# Patient Record
Sex: FEMALE | Race: BLACK OR AFRICAN AMERICAN | NOT HISPANIC OR LATINO | URBAN - METROPOLITAN AREA
[De-identification: names, ages, dates, MRNs, and addresses within clinical notes are randomized per-mention and may not be internally consistent; named-entity substitution may affect disease eponyms.]

---

## 2017-01-10 ENCOUNTER — OUTPATIENT (OUTPATIENT)
Dept: OUTPATIENT SERVICES | Facility: HOSPITAL | Age: 37
LOS: 1 days | Discharge: HOME | End: 2017-01-10

## 2017-01-10 ENCOUNTER — APPOINTMENT (OUTPATIENT)
Dept: PULMONOLOGY | Facility: CLINIC | Age: 37
End: 2017-01-10

## 2017-01-10 VITALS
OXYGEN SATURATION: 98 % | BODY MASS INDEX: 26.46 KG/M2 | DIASTOLIC BLOOD PRESSURE: 68 MMHG | HEIGHT: 64 IN | HEART RATE: 60 BPM | WEIGHT: 155 LBS | SYSTOLIC BLOOD PRESSURE: 105 MMHG

## 2017-01-10 DIAGNOSIS — Z82.69 FAMILY HISTORY OF OTHER DISEASES OF THE MUSCULOSKELETAL SYSTEM AND CONNECTIVE TISSUE: ICD-10-CM

## 2017-01-10 DIAGNOSIS — Z83.2 FAMILY HISTORY OF DISEASES OF THE BLOOD AND BLOOD-FORMING ORGANS AND CERTAIN DISORDERS INVOLVING THE IMMUNE MECHANISM: ICD-10-CM

## 2017-01-10 DIAGNOSIS — Q85.00 NEUROFIBROMATOSIS, UNSPECIFIED: ICD-10-CM

## 2017-01-10 DIAGNOSIS — G43.909 MIGRAINE, UNSPECIFIED, NOT INTRACTABLE, W/OUT STATUS MIGRAINOSUS: ICD-10-CM

## 2017-01-24 ENCOUNTER — APPOINTMENT (OUTPATIENT)
Dept: PULMONOLOGY | Facility: CLINIC | Age: 37
End: 2017-01-24

## 2017-01-24 VITALS
HEIGHT: 64 IN | OXYGEN SATURATION: 100 % | WEIGHT: 155 LBS | DIASTOLIC BLOOD PRESSURE: 68 MMHG | SYSTOLIC BLOOD PRESSURE: 116 MMHG | HEART RATE: 66 BPM | BODY MASS INDEX: 26.46 KG/M2

## 2017-01-24 DIAGNOSIS — Z78.9 OTHER SPECIFIED HEALTH STATUS: ICD-10-CM

## 2017-01-24 LAB
ANA PAT FLD IF-IMP: ABNORMAL
ANA TITR SER: ABNORMAL
APTT PPP: 39.9 SEC
CRP SERPL-MCNC: 1.99 MG/DL
DSDNA AB SER-ACNC: <12 IU/ML

## 2017-01-26 LAB
PS IGG SER-ACNC: <10 U/ML
PS IGM SER-ACNC: <25 U/ML

## 2017-01-31 LAB
CARDIOLIPIN IGG SER IA-ACNC: < 14 GPL
CARDIOLIPIN IGM SER IA-ACNC: < 12 MPL
FACT X ACT/NOR PPP: 94 %

## 2017-02-03 ENCOUNTER — OUTPATIENT (OUTPATIENT)
Dept: OUTPATIENT SERVICES | Facility: HOSPITAL | Age: 37
LOS: 1 days | Discharge: HOME | End: 2017-02-03

## 2017-02-03 ENCOUNTER — APPOINTMENT (OUTPATIENT)
Dept: HEMATOLOGY ONCOLOGY | Facility: CLINIC | Age: 37
End: 2017-02-03

## 2017-02-03 VITALS
DIASTOLIC BLOOD PRESSURE: 71 MMHG | TEMPERATURE: 99.3 F | HEART RATE: 81 BPM | SYSTOLIC BLOOD PRESSURE: 117 MMHG | RESPIRATION RATE: 14 BRPM | HEIGHT: 64 IN | BODY MASS INDEX: 26.98 KG/M2 | WEIGHT: 158 LBS

## 2017-02-03 DIAGNOSIS — Z86.69 PERSONAL HISTORY OF OTHER DISEASES OF THE NERVOUS SYSTEM AND SENSE ORGANS: ICD-10-CM

## 2017-02-03 DIAGNOSIS — D47.3 ESSENTIAL (HEMORRHAGIC) THROMBOCYTHEMIA: ICD-10-CM

## 2017-03-07 ENCOUNTER — APPOINTMENT (OUTPATIENT)
Dept: PULMONOLOGY | Facility: CLINIC | Age: 37
End: 2017-03-07

## 2017-03-13 PROBLEM — D50.9 IRON DEFICIENCY ANEMIA: Status: ACTIVE | Noted: 2017-03-13

## 2017-03-13 LAB
B2 GLYCOPROT1 IGA SER-ACNC: < 9 SAU
B2 GLYCOPROT1 IGG SER-ACNC: < 9 SGU
B2 GLYCOPROT1 IGM SER-ACNC: < 9 SMU
BASOPHILS # BLD: 0.11 TH/MM3
BASOPHILS NFR BLD: 0.8 %
EOSINOPHIL # BLD: 0.5 TH/MM3
EOSINOPHIL NFR BLD: 3.7 %
ERYTHROCYTE [DISTWIDTH] IN BLOOD BY AUTOMATED COUNT: 18 %
FERRITIN SERPL-MCNC: 17 NG/ML
GRANULOCYTES # BLD: 8.53 TH/MM3
GRANULOCYTES NFR BLD: 63.7 %
HCT VFR BLD AUTO: 30.6 %
HGB A MFR BLD: 97.9 %
HGB A2 MFR BLD: 2.1 %
HGB BLD-MCNC: 9.6 G/DL
HGB FRACT BLD ELPH CITRATE-IMP: NORMAL
IMM GRANULOCYTES # BLD: 0.02 TH/MM3
IMM GRANULOCYTES NFR BLD: 0.1 %
INTERPRETATION AND REPORT- PF2 (NORTH): NORMAL
IRON SERPL-MCNC: 12 UG/DL
JAK2 GENE MUT ANL BLD/T: NORMAL
LYMPHOCYTES # BLD: 3.33 TH/MM3
LYMPHOCYTES NFR BLD: 24.8 %
MCH RBC QN AUTO: 20.2 PG
MCHC RBC AUTO-ENTMCNC: 31.4 G/DL
MCV RBC AUTO: 64.3 FL
MONOCYTES # BLD: 0.92 TH/MM3
MONOCYTES NFR BLD: 6.9 %
PERCENT SATURATION (NORTH): 3.3 %
PLATELET # BLD: 571 TH/MM3
PMV BLD AUTO: 10 FL
RBC # BLD AUTO: 4.76 MIL/MM3
TIBC SERPL-MCNC: 364 UG/DL
TRANSFERRIN SERPL-MCNC: 260 MG/DL
WBC # BLD: 13.41 TH/MM3

## 2017-03-15 ENCOUNTER — APPOINTMENT (OUTPATIENT)
Dept: HEMATOLOGY ONCOLOGY | Facility: CLINIC | Age: 37
End: 2017-03-15

## 2017-03-15 DIAGNOSIS — D50.9 IRON DEFICIENCY ANEMIA, UNSPECIFIED: ICD-10-CM

## 2017-03-21 ENCOUNTER — APPOINTMENT (OUTPATIENT)
Dept: HEMATOLOGY ONCOLOGY | Facility: CLINIC | Age: 37
End: 2017-03-21

## 2017-06-27 DIAGNOSIS — D64.9 ANEMIA, UNSPECIFIED: ICD-10-CM

## 2017-06-27 DIAGNOSIS — D68.69 OTHER THROMBOPHILIA: ICD-10-CM

## 2017-10-26 DIAGNOSIS — I26.99 OTHER PULMONARY EMBOLISM WITHOUT ACUTE COR PULMONALE: ICD-10-CM

## 2017-11-18 ENCOUNTER — EMERGENCY (EMERGENCY)
Facility: HOSPITAL | Age: 37
LOS: 0 days | Discharge: HOME | End: 2017-11-19
Admitting: FAMILY MEDICINE

## 2017-11-18 DIAGNOSIS — Z79.01 LONG TERM (CURRENT) USE OF ANTICOAGULANTS: ICD-10-CM

## 2017-11-18 DIAGNOSIS — R53.1 WEAKNESS: ICD-10-CM

## 2017-11-18 DIAGNOSIS — Z79.899 OTHER LONG TERM (CURRENT) DRUG THERAPY: ICD-10-CM

## 2017-11-18 DIAGNOSIS — R06.02 SHORTNESS OF BREATH: ICD-10-CM

## 2017-11-18 DIAGNOSIS — I26.99 OTHER PULMONARY EMBOLISM WITHOUT ACUTE COR PULMONALE: ICD-10-CM

## 2017-11-18 DIAGNOSIS — N93.9 ABNORMAL UTERINE AND VAGINAL BLEEDING, UNSPECIFIED: ICD-10-CM

## 2017-12-28 ENCOUNTER — APPOINTMENT (OUTPATIENT)
Dept: OBGYN | Facility: CLINIC | Age: 37
End: 2017-12-28

## 2018-07-27 ENCOUNTER — OUTPATIENT (OUTPATIENT)
Dept: OUTPATIENT SERVICES | Facility: HOSPITAL | Age: 38
LOS: 1 days | Discharge: HOME | End: 2018-07-27

## 2018-07-27 DIAGNOSIS — R05 COUGH: ICD-10-CM

## 2018-07-27 DIAGNOSIS — J18.9 PNEUMONIA, UNSPECIFIED ORGANISM: ICD-10-CM

## 2018-08-03 ENCOUNTER — OUTPATIENT (OUTPATIENT)
Dept: OUTPATIENT SERVICES | Facility: HOSPITAL | Age: 38
LOS: 1 days | Discharge: HOME | End: 2018-08-03

## 2018-08-03 ENCOUNTER — INPATIENT (INPATIENT)
Facility: HOSPITAL | Age: 38
LOS: 5 days | Discharge: HOME | End: 2018-08-09
Attending: HOSPITALIST | Admitting: HOSPITALIST
Payer: MEDICAID

## 2018-08-03 VITALS
DIASTOLIC BLOOD PRESSURE: 59 MMHG | OXYGEN SATURATION: 100 % | HEART RATE: 113 BPM | RESPIRATION RATE: 20 BRPM | TEMPERATURE: 99 F | SYSTOLIC BLOOD PRESSURE: 127 MMHG

## 2018-08-03 DIAGNOSIS — J18.9 PNEUMONIA, UNSPECIFIED ORGANISM: ICD-10-CM

## 2018-08-03 DIAGNOSIS — R05 COUGH: ICD-10-CM

## 2018-08-03 LAB
ALBUMIN SERPL ELPH-MCNC: 4.1 G/DL — SIGNIFICANT CHANGE UP (ref 3.5–5.2)
ALP SERPL-CCNC: 76 U/L — SIGNIFICANT CHANGE UP (ref 30–115)
ALT FLD-CCNC: 15 U/L — SIGNIFICANT CHANGE UP (ref 0–41)
ANION GAP SERPL CALC-SCNC: 18 MMOL/L — HIGH (ref 7–14)
AST SERPL-CCNC: 16 U/L — SIGNIFICANT CHANGE UP (ref 0–41)
BILIRUB SERPL-MCNC: 0.2 MG/DL — SIGNIFICANT CHANGE UP (ref 0.2–1.2)
BUN SERPL-MCNC: 8 MG/DL — LOW (ref 10–20)
CALCIUM SERPL-MCNC: 9.4 MG/DL — SIGNIFICANT CHANGE UP (ref 8.5–10.1)
CHLORIDE SERPL-SCNC: 100 MMOL/L — SIGNIFICANT CHANGE UP (ref 98–110)
CO2 SERPL-SCNC: 22 MMOL/L — SIGNIFICANT CHANGE UP (ref 17–32)
CREAT SERPL-MCNC: 0.8 MG/DL — SIGNIFICANT CHANGE UP (ref 0.7–1.5)
GLUCOSE SERPL-MCNC: 100 MG/DL — HIGH (ref 70–99)
HCT VFR BLD CALC: 37 % — SIGNIFICANT CHANGE UP (ref 37–47)
HGB BLD-MCNC: 11.5 G/DL — LOW (ref 12–16)
MCHC RBC-ENTMCNC: 22.2 PG — LOW (ref 27–31)
MCHC RBC-ENTMCNC: 31.1 G/DL — LOW (ref 32–37)
MCV RBC AUTO: 71.6 FL — LOW (ref 81–99)
NRBC # BLD: 0 /100 WBCS — SIGNIFICANT CHANGE UP (ref 0–0)
PLATELET # BLD AUTO: 447 K/UL — HIGH (ref 130–400)
POTASSIUM SERPL-MCNC: 4.4 MMOL/L — SIGNIFICANT CHANGE UP (ref 3.5–5)
POTASSIUM SERPL-SCNC: 4.4 MMOL/L — SIGNIFICANT CHANGE UP (ref 3.5–5)
PROT SERPL-MCNC: 8 G/DL — SIGNIFICANT CHANGE UP (ref 6–8)
RBC # BLD: 5.17 M/UL — SIGNIFICANT CHANGE UP (ref 4.2–5.4)
RBC # FLD: 14.8 % — HIGH (ref 11.5–14.5)
SODIUM SERPL-SCNC: 140 MMOL/L — SIGNIFICANT CHANGE UP (ref 135–146)
WBC # BLD: 12.94 K/UL — HIGH (ref 4.8–10.8)
WBC # FLD AUTO: 12.94 K/UL — HIGH (ref 4.8–10.8)

## 2018-08-03 NOTE — ED ADULT NURSE NOTE - NSIMPLEMENTINTERV_GEN_ALL_ED
Implemented All Universal Safety Interventions:  [place] to call system. Call bell, personal items and telephone within reach. Instruct patient to call for assistance. Room bathroom lighting operational. Non-slip footwear when patient is off stretcher. Physically safe environment: no spills, clutter or unnecessary equipment. Stretcher in lowest position, wheels locked, appropriate side rails in place.

## 2018-08-03 NOTE — ED PROVIDER NOTE - MEDICAL DECISION MAKING DETAILS
38 female here for evaluation of fatigue, refractory pneumonia despite outpatient antibiotics. Got labs imaging supportive care and IV antibiotics, will admit for new left sided cavitary lesion.

## 2018-08-03 NOTE — ED PROVIDER NOTE - NS ED ROS FT
Eyes:  No visual changes, eye pain or discharge.  ENMT:  No hearing changes, pain, discharge or infections. No neck pain or stiffness.  Cardiac:  SOB. No chest pain or edema.   Respiratory: Cough, SOB. No respiratory distress. No hemoptysis.   GI:  No nausea, vomiting, diarrhea or abdominal pain.  :  No dysuria, frequency or burning.  MS:  No myalgia, muscle weakness, joint pain or back pain.  Neuro:  No headache or weakness.  No LOC.  Skin:  No skin rash.   Endocrine: No history of thyroid disease or diabetes.

## 2018-08-03 NOTE — ED ADULT NURSE REASSESSMENT NOTE - NS ED NURSE REASSESS COMMENT FT1
pt received from previous shift. awake alert and interactive. pt denies any pain/discomfort at this time. remains in iso room for r/b TB. labs sent and IV placed. will continue care.

## 2018-08-03 NOTE — ED PROVIDER NOTE - PHYSICAL EXAMINATION
CONSTITUTIONAL: Well-developed; well-nourished; in no acute distress.   SKIN: warm, dry  HEAD: Normocephalic; atraumatic.  EYES: normal sclera and conjunctiva   ENT: No nasal discharge; airway clear.  NECK: Supple; non tender.  CARD: S1, S2 normal; no murmurs, gallops, or rubs. Regular rate and rhythm.   RESP: No wheezes, rales or rhonchi. No increased respiratory effort. No nasal flaring, accessory muscle use.   ABD: soft ntnd  EXT: Normal ROM.  No clubbing, cyanosis or edema.   LYMPH: No acute cervical adenopathy.  NEURO: Alert, oriented, grossly unremarkable  PSYCH: Cooperative, appropriate.

## 2018-08-03 NOTE — ED PROVIDER NOTE - ATTENDING CONTRIBUTION TO CARE
I personally evaluated the patient. I reviewed the Resident’s or Physician Assistant’s note (as assigned above), and agree with the findings and plan except as documented in my note.    38 female here for cough x 1 month, refractory to multiple rounds of respiratory antibiotics. Had persistent LLL opacity on subsequent imaging today.     No travel, incarceration, sick contacts.     ROS: fatigue, night sweats & fevers.  No weight loss, no rash, no neck pain or neuro symptoms.     PE: female in no distress. CHEST: CTA bilateral with left sided basilar dullness. CV: pulses intact. SKIN: normal. EXT: FROM x 4 NVI.    Impression: pneumonia, cavitary lesion    Plan: IV labs imaging supportive care ABX and admission

## 2018-08-04 LAB
ANION GAP SERPL CALC-SCNC: 15 MMOL/L — HIGH (ref 7–14)
BASOPHILS # BLD AUTO: 0.13 K/UL — SIGNIFICANT CHANGE UP (ref 0–0.2)
BASOPHILS NFR BLD AUTO: 1.1 % — HIGH (ref 0–1)
BUN SERPL-MCNC: 7 MG/DL — LOW (ref 10–20)
CALCIUM SERPL-MCNC: 8.8 MG/DL — SIGNIFICANT CHANGE UP (ref 8.5–10.1)
CHLORIDE SERPL-SCNC: 103 MMOL/L — SIGNIFICANT CHANGE UP (ref 98–110)
CO2 SERPL-SCNC: 20 MMOL/L — SIGNIFICANT CHANGE UP (ref 17–32)
CREAT SERPL-MCNC: 0.7 MG/DL — SIGNIFICANT CHANGE UP (ref 0.7–1.5)
EOSINOPHIL # BLD AUTO: 0.63 K/UL — SIGNIFICANT CHANGE UP (ref 0–0.7)
EOSINOPHIL NFR BLD AUTO: 5.3 % — SIGNIFICANT CHANGE UP (ref 0–8)
ERYTHROCYTE [SEDIMENTATION RATE] IN BLOOD: 58 MM/HR — HIGH (ref 0–15)
GLUCOSE SERPL-MCNC: 88 MG/DL — SIGNIFICANT CHANGE UP (ref 70–99)
HCT VFR BLD CALC: 34.7 % — LOW (ref 37–47)
HGB BLD-MCNC: 10.9 G/DL — LOW (ref 12–16)
IMM GRANULOCYTES NFR BLD AUTO: 0.3 % — SIGNIFICANT CHANGE UP (ref 0.1–0.3)
LYMPHOCYTES # BLD AUTO: 28.6 % — SIGNIFICANT CHANGE UP (ref 20.5–51.1)
LYMPHOCYTES # BLD AUTO: 3.39 K/UL — SIGNIFICANT CHANGE UP (ref 1.2–3.4)
MCHC RBC-ENTMCNC: 22.2 PG — LOW (ref 27–31)
MCHC RBC-ENTMCNC: 31.4 G/DL — LOW (ref 32–37)
MCV RBC AUTO: 70.5 FL — LOW (ref 81–99)
MONOCYTES # BLD AUTO: 0.96 K/UL — HIGH (ref 0.1–0.6)
MONOCYTES NFR BLD AUTO: 8.1 % — SIGNIFICANT CHANGE UP (ref 1.7–9.3)
NEUTROPHILS # BLD AUTO: 6.72 K/UL — HIGH (ref 1.4–6.5)
NEUTROPHILS NFR BLD AUTO: 56.6 % — SIGNIFICANT CHANGE UP (ref 42.2–75.2)
NRBC # BLD: 0 /100 WBCS — SIGNIFICANT CHANGE UP (ref 0–0)
PLATELET # BLD AUTO: 427 K/UL — HIGH (ref 130–400)
POTASSIUM SERPL-MCNC: 4.3 MMOL/L — SIGNIFICANT CHANGE UP (ref 3.5–5)
POTASSIUM SERPL-SCNC: 4.3 MMOL/L — SIGNIFICANT CHANGE UP (ref 3.5–5)
RBC # BLD: 4.92 M/UL — SIGNIFICANT CHANGE UP (ref 4.2–5.4)
RBC # FLD: 14.8 % — HIGH (ref 11.5–14.5)
SODIUM SERPL-SCNC: 138 MMOL/L — SIGNIFICANT CHANGE UP (ref 135–146)
WBC # BLD: 11.86 K/UL — HIGH (ref 4.8–10.8)
WBC # FLD AUTO: 11.86 K/UL — HIGH (ref 4.8–10.8)

## 2018-08-04 RX ORDER — CEFEPIME 1 G/1
INJECTION, POWDER, FOR SOLUTION INTRAMUSCULAR; INTRAVENOUS
Qty: 0 | Refills: 0 | Status: DISCONTINUED | OUTPATIENT
Start: 2018-08-04 | End: 2018-08-04

## 2018-08-04 RX ORDER — VANCOMYCIN HCL 1 G
1000 VIAL (EA) INTRAVENOUS EVERY 8 HOURS
Qty: 0 | Refills: 0 | Status: DISCONTINUED | OUTPATIENT
Start: 2018-08-04 | End: 2018-08-04

## 2018-08-04 RX ORDER — ENOXAPARIN SODIUM 100 MG/ML
40 INJECTION SUBCUTANEOUS EVERY 24 HOURS
Qty: 0 | Refills: 0 | Status: DISCONTINUED | OUTPATIENT
Start: 2018-08-04 | End: 2018-08-09

## 2018-08-04 RX ORDER — AMPICILLIN SODIUM AND SULBACTAM SODIUM 250; 125 MG/ML; MG/ML
3 INJECTION, POWDER, FOR SUSPENSION INTRAMUSCULAR; INTRAVENOUS EVERY 6 HOURS
Qty: 0 | Refills: 0 | Status: DISCONTINUED | OUTPATIENT
Start: 2018-08-04 | End: 2018-08-09

## 2018-08-04 RX ORDER — CIPROFLOXACIN LACTATE 400MG/40ML
400 VIAL (ML) INTRAVENOUS ONCE
Qty: 0 | Refills: 0 | Status: COMPLETED | OUTPATIENT
Start: 2018-08-04 | End: 2018-08-04

## 2018-08-04 RX ORDER — PIPERACILLIN AND TAZOBACTAM 4; .5 G/20ML; G/20ML
4.5 INJECTION, POWDER, LYOPHILIZED, FOR SOLUTION INTRAVENOUS EVERY 6 HOURS
Qty: 0 | Refills: 0 | Status: DISCONTINUED | OUTPATIENT
Start: 2018-08-04 | End: 2018-08-04

## 2018-08-04 RX ORDER — ACETAMINOPHEN 500 MG
650 TABLET ORAL EVERY 6 HOURS
Qty: 0 | Refills: 0 | Status: DISCONTINUED | OUTPATIENT
Start: 2018-08-04 | End: 2018-08-09

## 2018-08-04 RX ORDER — CEFEPIME 1 G/1
1000 INJECTION, POWDER, FOR SOLUTION INTRAMUSCULAR; INTRAVENOUS ONCE
Qty: 0 | Refills: 0 | Status: COMPLETED | OUTPATIENT
Start: 2018-08-04 | End: 2018-08-04

## 2018-08-04 RX ORDER — CEFEPIME 1 G/1
1000 INJECTION, POWDER, FOR SOLUTION INTRAMUSCULAR; INTRAVENOUS EVERY 12 HOURS
Qty: 0 | Refills: 0 | Status: DISCONTINUED | OUTPATIENT
Start: 2018-08-04 | End: 2018-08-04

## 2018-08-04 RX ADMIN — CEFEPIME 100 MILLIGRAM(S): 1 INJECTION, POWDER, FOR SOLUTION INTRAMUSCULAR; INTRAVENOUS at 01:37

## 2018-08-04 RX ADMIN — Medication 250 MILLIGRAM(S): at 06:26

## 2018-08-04 RX ADMIN — Medication 250 MILLIGRAM(S): at 14:17

## 2018-08-04 RX ADMIN — PIPERACILLIN AND TAZOBACTAM 25 GRAM(S): 4; .5 INJECTION, POWDER, LYOPHILIZED, FOR SOLUTION INTRAVENOUS at 10:14

## 2018-08-04 RX ADMIN — Medication 200 MILLIGRAM(S): at 01:37

## 2018-08-04 RX ADMIN — AMPICILLIN SODIUM AND SULBACTAM SODIUM 200 GRAM(S): 250; 125 INJECTION, POWDER, FOR SUSPENSION INTRAMUSCULAR; INTRAVENOUS at 17:56

## 2018-08-04 RX ADMIN — ENOXAPARIN SODIUM 40 MILLIGRAM(S): 100 INJECTION SUBCUTANEOUS at 06:26

## 2018-08-04 RX ADMIN — PIPERACILLIN AND TAZOBACTAM 25 GRAM(S): 4; .5 INJECTION, POWDER, LYOPHILIZED, FOR SOLUTION INTRAVENOUS at 16:05

## 2018-08-04 NOTE — CONSULT NOTE ADULT - SUBJECTIVE AND OBJECTIVE BOX
LUBA RODRIGUEZ  38y, Female  Allergy: No Known Allergies      HPI:  38 F w/ a pmh of neurofibromatosis, migraines, & an unprovoked PE in 2016 sent in to the ED by her PMD for persistent pneumonia. The pts began having a non-productive cough & fever on 7/17 & went to urgent care & was prescribed a zpak & amoxicillin for 3 days. She continued to be symptomatic and went to her pmd who dx her w/ pna & prescribed her Levaquin for 10 days. The pt returned to her pmd w/out improvement & a repeat cxr showed a cavitary lesion in the LLL.    Pt denies fever, night sweats, n/v, hemoptysis, sputum production, wt loss, known TB contacts, travel outside of , [facility]. No pets at home. 5 kids. Previous negative TB testing, no incarceration or homeless shelters.     2016 CT Chest with LLL changes.    FAMILY HISTORY:  No pertinent family history in first degree relatives    PAST MEDICAL & SURGICAL HISTORY:  Migraine  Pulmonary embolism  No significant past surgical history      ROS negative except as per HPI    VITALS:  T(F): 97.8, Max: 99.1 (08-03-18 @ 18:31)  HR: 76  BP: 105/51  RR: 16Vital Signs Last 24 Hrs  T(C): 36.6 (04 Aug 2018 16:02), Max: 37.3 (03 Aug 2018 18:31)  T(F): 97.8 (04 Aug 2018 16:02), Max: 99.1 (03 Aug 2018 18:31)  HR: 76 (04 Aug 2018 16:02) (76 - 113)  BP: 105/51 (04 Aug 2018 16:02) (105/51 - 127/59)  BP(mean): --  RR: 16 (04 Aug 2018 16:02) (16 - 20)  SpO2: 98% (04 Aug 2018 16:02) (98% - 100%)    PHYSICAL EXAM:  Gen: Awake and alert, non-toxic appearing, NAD  HEENT: NCAT. EOMI. MMM.   Neck: Supple, no cervical LAD  CV: RRR, no murmurs  Lungs: CTAB, no w/r/r  Abd: Soft. NTND  Extr: wwp, no edema  Skin: no rash, +NF  Neuro: No focal deficits  Lines: clean      TESTS & MEASUREMENTS:                        10.9   11.86 )-----------( 427      ( 04 Aug 2018 07:15 )             34.7     08-04    138  |  103  |  7<L>  ----------------------------<  88  4.3   |  20  |  0.7    Ca    8.8      04 Aug 2018 07:15    TPro  8.0  /  Alb  4.1  /  TBili  0.2  /  DBili  x   /  AST  16  /  ALT  15  /  AlkPhos  76  08-03    LIVER FUNCTIONS - ( 03 Aug 2018 19:13 )  Alb: 4.1 g/dL / Pro: 8.0 g/dL / ALK PHOS: 76 U/L / ALT: 15 U/L / AST: 16 U/L / GGT: x                   RADIOLOGY & ADDITIONAL TESTS:    ANTIBIOTICS:    cefepime   IVPB   100 mL/Hr IV Intermittent (08-04-18 @ 01:37)    ciprofloxacin   IVPB   200 mL/Hr IV Intermittent (08-04-18 @ 01:37)    levoFLOXacin IVPB   100 mL/Hr IV Intermittent (08-04-18 @ 08:29)    piperacillin/tazobactam IVPB.   25 mL/Hr IV Intermittent (08-04-18 @ 16:05)   25 mL/Hr IV Intermittent (08-04-18 @ 10:14)    vancomycin  IVPB   250 mL/Hr IV Intermittent (08-04-18 @ 14:17)   250 mL/Hr IV Intermittent (08-04-18 @ 06:26)

## 2018-08-04 NOTE — H&P ADULT - NSHPPHYSICALEXAM_GEN_ALL_CORE
VITALS:   T(F): 99.1  HR: 113  BP: 127/59  RR: 20  SpO2: 100%    PHYSICAL EXAM:  GEN: No acute distress  LUNGS: Rales on L side, clear on right  HEART: S1/S2 present. RRR.   ABD: Soft, non-tender, non-distended. Bowel sounds present  EXT: NC/NC/NE/SILVA  NEURO: AAOX3

## 2018-08-04 NOTE — CONSULT NOTE ADULT - ASSESSMENT
38 F w/ a pmh of neurofibromatosis, migraines, & an unprovoked PE in 2016 sent in to the ED by her PMD for persistent pneumonia. The pts began having a non-productive cough & fever on 7/17 s/p zpak and levaquin found to have a LLL cavitary lesion    No risk factors for TB and previous changes on CT LLL in 2016 during PE diagnosis. Would be strange to have basilar TB disease.  Cavity related to ?Neurofibromatosis ?ruptured bullae/cyst?    sepsis ruled out on admission    - D/C airborne precautions, discuss Pulm   - Change cefepime/vanc to unasyn 3g q6h  - Pulm consult    Spectra [phone]

## 2018-08-04 NOTE — H&P ADULT - NSHPLABSRESULTS_GEN_ALL_CORE
LABS:                        11.5   12.94 )-----------( 447      ( 03 Aug 2018 19:13 )             37.0     08-03    140  |  100  |  8<L>  ----------------------------<  100<H>  4.4   |  22  |  0.8    Ca    9.4      03 Aug 2018 19:13    TPro  8.0  /  Alb  4.1  /  TBili  0.2  /  DBili  x   /  AST  16  /  ALT  15  /  AlkPhos  76  08-03                  RADIOLOGY:    CT CHEST   IMPRESSION:    1.  Left lower lobe bronchiectasis, distal airways increased in size   since 2017.     2.  Increased left lower lobe airspace opacities/consolidations, possibly   infectious or inflammatory in nature.              ******PRELIMINARY REPORT******      CXR    pending read

## 2018-08-04 NOTE — H&P ADULT - HISTORY OF PRESENT ILLNESS
38 F w/ a pmh of neurofibromatosis, migraines, & a PE in 2016 who is sent in to the ED by her PMD for persistent pneumonia. The pts began having a non-productive cough & fever on 7/17 & went to urgent care & was prescribed a zpak & amoxicillin for 3 days. She continued to be symptomatic and went to her pmd who dx her w/ pna & prescribed her Levaquin for 10 days. The pt returned to her pmd w/out improvement & a repeat cxr showed a cavitary lesion on the left side and her pmd sent her to ED to r/o TB. ROS is + for SOB, fatigue, tmax of 101.3, denies night sweats or chills or cp, n/v or diarrhea. Pt denies any travel history, has never left the [place], denies any contact w/ anyone w/ TB.  Side note, pt was discharged on eliquis in 2016 for pe, and had stopped taking it on her own.

## 2018-08-04 NOTE — H&P ADULT - NSHPSOCIALHISTORY_GEN_ALL_CORE
Lives at home with her children  Negative for tobacco use or illicit drug use. Drinks alcohol occasionally

## 2018-08-04 NOTE — H&P ADULT - ASSESSMENT
38 F w/ a pmh of neurofibromatosis, migraines, & a PE in 2016 who is sent in to the ED by her PMD for persistent pneumonia sxs (non-prod cough, on and off fevers, sob, fatigue) despite antibiotic treatment. O/p cxr revealed cavitary lesions & she was sent in by pmd to r/o tb as well.     # CAP. r/o TB  - Vanc, zosyn, and levofloxacin  - quantiferon, sputum induction & culture for afb  - id consult  - pulm consult  - check cultures  - f/u official reads on cxr and ct scan    # DVT ppx: Lovenox  # Regular diet  # Full code

## 2018-08-04 NOTE — H&P ADULT - ATTENDING COMMENTS
Patient seen and examined independently. I agree with the resident's note, physical exam, and plan except as below. Patient feels sightly better.       38 F w/ a pmh of neurofibromatosis, migraines, & a PE in 2016 who is sent in to the ED by her PMD for persistent pneumonia sxs (non-prod cough, on and off fevers, sob, fatigue) despite antibiotic treatment. O/p cxr revealed cavitary lesions & she was sent in by pmd to r/o tb as well.     Denies CP, N/V/D/C/AP, cough, wt loss, dizziness, new focal weakness, HA, vision changes, dysuria, or urinary symptoms, blood in stool.    ROS: all systems unremarkable except as above.     Gen: NAD, AA0x3  HEENT: PERRLA, EOM, no LAD  CV: nl S1 S2  Resp: decreased BS b/l  GI: NT/ND/S +BS  MS: no c/c/e  Neuro: nonfocal    # Cavitary lesion r/o TB vs MAC  - Vanc, zosyn, and levofloxacin  - quantiferon, sputum induction & culture for afb  - id consult  - pulm consult  - check cultures    #H/o PE  -completed >1yr of Eliquis  -not sure of etiology    #Migraines/Neurofibromatosis  -stable      # DVT ppx: Lovenox  # Regular diet  # Full code

## 2018-08-05 LAB
ALBUMIN SERPL ELPH-MCNC: 3.5 G/DL — SIGNIFICANT CHANGE UP (ref 3.5–5.2)
ALP SERPL-CCNC: 71 U/L — SIGNIFICANT CHANGE UP (ref 30–115)
ALT FLD-CCNC: 13 U/L — SIGNIFICANT CHANGE UP (ref 0–41)
ANION GAP SERPL CALC-SCNC: 16 MMOL/L — HIGH (ref 7–14)
AST SERPL-CCNC: 15 U/L — SIGNIFICANT CHANGE UP (ref 0–41)
BASOPHILS # BLD AUTO: 0.09 K/UL — SIGNIFICANT CHANGE UP (ref 0–0.2)
BASOPHILS NFR BLD AUTO: 0.9 % — SIGNIFICANT CHANGE UP (ref 0–1)
BILIRUB SERPL-MCNC: 0.2 MG/DL — SIGNIFICANT CHANGE UP (ref 0.2–1.2)
BUN SERPL-MCNC: 7 MG/DL — LOW (ref 10–20)
CALCIUM SERPL-MCNC: 8.8 MG/DL — SIGNIFICANT CHANGE UP (ref 8.5–10.1)
CHLORIDE SERPL-SCNC: 103 MMOL/L — SIGNIFICANT CHANGE UP (ref 98–110)
CO2 SERPL-SCNC: 21 MMOL/L — SIGNIFICANT CHANGE UP (ref 17–32)
CREAT SERPL-MCNC: 0.7 MG/DL — SIGNIFICANT CHANGE UP (ref 0.7–1.5)
CRP SERPL-MCNC: 4.25 MG/DL — HIGH (ref 0–0.4)
EOSINOPHIL # BLD AUTO: 0.52 K/UL — SIGNIFICANT CHANGE UP (ref 0–0.7)
EOSINOPHIL NFR BLD AUTO: 5 % — SIGNIFICANT CHANGE UP (ref 0–8)
GLUCOSE SERPL-MCNC: 86 MG/DL — SIGNIFICANT CHANGE UP (ref 70–99)
HCT VFR BLD CALC: 34 % — LOW (ref 37–47)
HGB BLD-MCNC: 10.7 G/DL — LOW (ref 12–16)
IMM GRANULOCYTES NFR BLD AUTO: 0.5 % — HIGH (ref 0.1–0.3)
LYMPHOCYTES # BLD AUTO: 2.26 K/UL — SIGNIFICANT CHANGE UP (ref 1.2–3.4)
LYMPHOCYTES # BLD AUTO: 21.5 % — SIGNIFICANT CHANGE UP (ref 20.5–51.1)
MCHC RBC-ENTMCNC: 22.3 PG — LOW (ref 27–31)
MCHC RBC-ENTMCNC: 31.5 G/DL — LOW (ref 32–37)
MCV RBC AUTO: 71 FL — LOW (ref 81–99)
MONOCYTES # BLD AUTO: 0.8 K/UL — HIGH (ref 0.1–0.6)
MONOCYTES NFR BLD AUTO: 7.6 % — SIGNIFICANT CHANGE UP (ref 1.7–9.3)
NEUTROPHILS # BLD AUTO: 6.78 K/UL — HIGH (ref 1.4–6.5)
NEUTROPHILS NFR BLD AUTO: 64.5 % — SIGNIFICANT CHANGE UP (ref 42.2–75.2)
NRBC # BLD: 0 /100 WBCS — SIGNIFICANT CHANGE UP (ref 0–0)
PLATELET # BLD AUTO: 397 K/UL — SIGNIFICANT CHANGE UP (ref 130–400)
POTASSIUM SERPL-MCNC: 4.4 MMOL/L — SIGNIFICANT CHANGE UP (ref 3.5–5)
POTASSIUM SERPL-SCNC: 4.4 MMOL/L — SIGNIFICANT CHANGE UP (ref 3.5–5)
PROT SERPL-MCNC: 6.9 G/DL — SIGNIFICANT CHANGE UP (ref 6–8)
RBC # BLD: 4.79 M/UL — SIGNIFICANT CHANGE UP (ref 4.2–5.4)
RBC # FLD: 14.7 % — HIGH (ref 11.5–14.5)
SODIUM SERPL-SCNC: 140 MMOL/L — SIGNIFICANT CHANGE UP (ref 135–146)
WBC # BLD: 10.5 K/UL — SIGNIFICANT CHANGE UP (ref 4.8–10.8)
WBC # FLD AUTO: 10.5 K/UL — SIGNIFICANT CHANGE UP (ref 4.8–10.8)

## 2018-08-05 RX ADMIN — AMPICILLIN SODIUM AND SULBACTAM SODIUM 200 GRAM(S): 250; 125 INJECTION, POWDER, FOR SUSPENSION INTRAMUSCULAR; INTRAVENOUS at 12:12

## 2018-08-05 RX ADMIN — AMPICILLIN SODIUM AND SULBACTAM SODIUM 200 GRAM(S): 250; 125 INJECTION, POWDER, FOR SUSPENSION INTRAMUSCULAR; INTRAVENOUS at 20:57

## 2018-08-05 RX ADMIN — AMPICILLIN SODIUM AND SULBACTAM SODIUM 200 GRAM(S): 250; 125 INJECTION, POWDER, FOR SUSPENSION INTRAMUSCULAR; INTRAVENOUS at 06:32

## 2018-08-05 RX ADMIN — AMPICILLIN SODIUM AND SULBACTAM SODIUM 200 GRAM(S): 250; 125 INJECTION, POWDER, FOR SUSPENSION INTRAMUSCULAR; INTRAVENOUS at 01:10

## 2018-08-05 NOTE — PROGRESS NOTE ADULT - SUBJECTIVE AND OBJECTIVE BOX
SUBJECTIVE:    Patient is a 38y old Female who presents with a chief complaint of PNA / R/o Tb (04 Aug 2018 02:13)    Currently admitted to medicine with the primary diagnosis of Pneumonia     Today is hospital day 1d. This morning she is resting comfortably in bed    PAST MEDICAL & SURGICAL HISTORY  Migraine  Pulmonary embolism  No significant past surgical history    SOCIAL HISTORY:  Negative for smoking/alcohol/drug use.     Home Medications:  Home Medications:      ALLERGIES:  No Known Allergies    MEDICATIONS:  STANDING MEDICATIONS  ampicillin/sulbactam  IVPB 3 Gram(s) IV Intermittent every 6 hours  enoxaparin Injectable 40 milliGRAM(s) SubCutaneous every 24 hours    PRN MEDICATIONS  acetaminophen   Tablet 650 milliGRAM(s) Oral every 6 hours PRN    VITALS:   Vital Signs Last 24 Hrs  T(C): 36.8 (05 Aug 2018 07:32), Max: 37.5 (04 Aug 2018 23:06)  T(F): 98.3 (05 Aug 2018 07:32), Max: 99.5 (04 Aug 2018 23:06)  HR: 73 (05 Aug 2018 07:32) (73 - 93)  BP: 105/56 (05 Aug 2018 07:32) (100/55 - 105/59)  BP(mean): --  RR: 17 (05 Aug 2018 07:32) (16 - 17)  SpO2: 100% (05 Aug 2018 07:32) (98% - 100%)  CAPILLARY BLOOD GLUCOSE    LABS:                        10.7   10.50 )-----------( 397      ( 05 Aug 2018 07:26 )             34.0     08-05    140  |  103  |  7<L>  ----------------------------<  86  4.4   |  21  |  0.7    Ca    8.8      05 Aug 2018 07:26    TPro  6.9  /  Alb  3.5  /  TBili  0.2  /  DBili  x   /  AST  15  /  ALT  13  /  AlkPhos  71  08-05    Sedimentation Rate, Erythrocyte: 58 mm/hr <H> (08-04-18 @ 16:52)    RADIOLOGY:  < from: CT Chest w/ IV Cont (08.03.18 @ 23:40) >  1.  When compared to prior 2017 studies, there is increased cavitation   within the left lower lobe with increased bronchiectasis and adjacent   patchy confluent opacities as well as peribronchiolar nodules. Correlate   for superimposed infection.    < end of copied text >    PHYSICAL EXAM:  GEN: No acute distress  LUNGS: Rales appreciated on Left  HEART: S1/S2 present. RRR.   ABD: Soft, non-tender, non-distended. Bowel sounds present  EXT: NC/NC/NE/2+PP/SILVA  NEURO: AAOX3

## 2018-08-05 NOTE — PROGRESS NOTE ADULT - SUBJECTIVE AND OBJECTIVE BOX
JENNIFER, LUBA  38y, Female      OVERNIGHT EVENTS:  afebrile  reports no change in cough    ROS negative except as per above    VITALS:  T(F): 98.7, Max: 99.5 (08-04-18 @ 23:06)  HR: 80  BP: 103/56  RR: 18Vital Signs Last 24 Hrs  T(C): 37.1 (05 Aug 2018 15:17), Max: 37.5 (04 Aug 2018 23:06)  T(F): 98.7 (05 Aug 2018 15:17), Max: 99.5 (04 Aug 2018 23:06)  HR: 80 (05 Aug 2018 15:17) (73 - 93)  BP: 103/56 (05 Aug 2018 15:17) (100/55 - 105/59)  BP(mean): --  RR: 18 (05 Aug 2018 15:17) (16 - 18)  SpO2: 100% (05 Aug 2018 15:17) (100% - 100%)    PHYSICAL EXAM:  Gen: Awake and alert, non-toxic appearing, NAD  HEENT: NCAT. EOMI. MMM.   Neck: Supple, no cervical LAD  CV: RRR, no murmurs  Lungs: CTAB, no w/r/r  Abd: Soft. NTND  Extr: wwp, no edema  Skin: no rash, +NF  Neuro: No focal deficits  Lines: clean      TESTS & MEASUREMENTS:                        10.7   10.50 )-----------( 397      ( 05 Aug 2018 07:26 )             34.0     08-05    140  |  103  |  7<L>  ----------------------------<  86  4.4   |  21  |  0.7    Ca    8.8      05 Aug 2018 07:26    TPro  6.9  /  Alb  3.5  /  TBili  0.2  /  DBili  x   /  AST  15  /  ALT  13  /  AlkPhos  71  08-05    LIVER FUNCTIONS - ( 05 Aug 2018 07:26 )  Alb: 3.5 g/dL / Pro: 6.9 g/dL / ALK PHOS: 71 U/L / ALT: 13 U/L / AST: 15 U/L / GGT: x             Culture - Blood (collected 08-04-18 @ 07:15)  Source: .Blood Blood  Preliminary Report (08-05-18 @ 15:02):    No growth to date.          RADIOLOGY & ADDITIONAL TESTS:    ANTIBIOTICS:  ampicillin/sulbactam  IVPB   200 mL/Hr IV Intermittent (08-05-18 @ 12:12)   200 mL/Hr IV Intermittent (08-05-18 @ 06:32)   200 mL/Hr IV Intermittent (08-05-18 @ 01:10)   200 mL/Hr IV Intermittent (08-04-18 @ 17:56)    cefepime   IVPB   100 mL/Hr IV Intermittent (08-04-18 @ 01:37)    ciprofloxacin   IVPB   200 mL/Hr IV Intermittent (08-04-18 @ 01:37)    levoFLOXacin IVPB   100 mL/Hr IV Intermittent (08-04-18 @ 08:29)    piperacillin/tazobactam IVPB.   25 mL/Hr IV Intermittent (08-04-18 @ 16:05)   25 mL/Hr IV Intermittent (08-04-18 @ 10:14)    vancomycin  IVPB   250 mL/Hr IV Intermittent (08-04-18 @ 14:17)   250 mL/Hr IV Intermittent (08-04-18 @ 06:26)        ampicillin/sulbactam  IVPB 3 Gram(s) IV Intermittent every 6 hours

## 2018-08-05 NOTE — PROGRESS NOTE ADULT - ASSESSMENT
38 F w/ a pmh of neurofibromatosis, migraines, & a PE in 2016 who is sent in to the ED by her PMD for persistent pneumonia sxs (non-prod cough, on and off fevers, sob, fatigue) despite antibiotic treatment. O/p cxr revealed cavitary lesions & she was sent in by pmd to r/o tb as well.     # Cough, SOB, and fatigue r/o persistent PNA  - on Admission WBC 12, Afebrile and vitals stable  - CT chest shows increased cavitation within the LLL  - quantiferon, sputum induction & culture for afb pending  - id consult recommended Only Unasyn and Unlikely TB. D/C Isolation  - pulm consult Pending  - f/u Blood culture    # DVT ppx: Lovenox  # Regular diet  # Full code

## 2018-08-05 NOTE — PROGRESS NOTE ADULT - ASSESSMENT
38 F w/ a pmh of neurofibromatosis, migraines, & an unprovoked PE in 2016 sent in to the ED by her PMD for persistent pneumonia. The pts began having a non-productive cough & fever on 7/17 s/p zpak and levaquin found to have a LLL cavitary lesion    No risk factors for TB and previous changes on CT LLL in 2016 during PE diagnosis. Would be strange to have basilar TB disease.  Cavity related to ?Neurofibromatosis ?ruptured bullae/cyst?    sepsis ruled out on admission    - Consult Pulm  - unasyn 3g q6h  - send sputum cx if able to       Spectra [phone]

## 2018-08-06 LAB
ALBUMIN SERPL ELPH-MCNC: 3.8 G/DL — SIGNIFICANT CHANGE UP (ref 3.5–5.2)
ALP SERPL-CCNC: 73 U/L — SIGNIFICANT CHANGE UP (ref 30–115)
ALT FLD-CCNC: 14 U/L — SIGNIFICANT CHANGE UP (ref 0–41)
ANION GAP SERPL CALC-SCNC: 14 MMOL/L — SIGNIFICANT CHANGE UP (ref 7–14)
AST SERPL-CCNC: 19 U/L — SIGNIFICANT CHANGE UP (ref 0–41)
BASOPHILS # BLD AUTO: 0.11 K/UL — SIGNIFICANT CHANGE UP (ref 0–0.2)
BASOPHILS NFR BLD AUTO: 0.9 % — SIGNIFICANT CHANGE UP (ref 0–1)
BILIRUB SERPL-MCNC: <0.2 MG/DL — SIGNIFICANT CHANGE UP (ref 0.2–1.2)
BUN SERPL-MCNC: 6 MG/DL — LOW (ref 10–20)
C3 SERPL-MCNC: 192 MG/DL — HIGH (ref 81–157)
C4 SERPL-MCNC: 40 MG/DL — HIGH (ref 13–39)
CALCIUM SERPL-MCNC: 9.2 MG/DL — SIGNIFICANT CHANGE UP (ref 8.5–10.1)
CHLORIDE SERPL-SCNC: 98 MMOL/L — SIGNIFICANT CHANGE UP (ref 98–110)
CO2 SERPL-SCNC: 23 MMOL/L — SIGNIFICANT CHANGE UP (ref 17–32)
CREAT SERPL-MCNC: 0.6 MG/DL — LOW (ref 0.7–1.5)
EOSINOPHIL # BLD AUTO: 0.72 K/UL — HIGH (ref 0–0.7)
EOSINOPHIL NFR BLD AUTO: 6 % — SIGNIFICANT CHANGE UP (ref 0–8)
GLUCOSE SERPL-MCNC: 87 MG/DL — SIGNIFICANT CHANGE UP (ref 70–99)
HCT VFR BLD CALC: 35.5 % — LOW (ref 37–47)
HGB BLD-MCNC: 11.3 G/DL — LOW (ref 12–16)
IMM GRANULOCYTES NFR BLD AUTO: 0.5 % — HIGH (ref 0.1–0.3)
LYMPHOCYTES # BLD AUTO: 28.4 % — SIGNIFICANT CHANGE UP (ref 20.5–51.1)
LYMPHOCYTES # BLD AUTO: 3.41 K/UL — HIGH (ref 1.2–3.4)
M TB TUBERC IFN-G BLD QL: -0.02 IU/ML — SIGNIFICANT CHANGE UP
M TB TUBERC IFN-G BLD QL: 0.03 IU/ML — SIGNIFICANT CHANGE UP
M TB TUBERC IFN-G BLD QL: 0.07 IU/ML — SIGNIFICANT CHANGE UP
M TB TUBERC IFN-G BLD QL: 0.09 IU/ML — SIGNIFICANT CHANGE UP
M TB TUBERC IFN-G BLD QL: NEGATIVE — SIGNIFICANT CHANGE UP
M TB TUBERC IFN-G BLD QL: NEGATIVE — SIGNIFICANT CHANGE UP
MCHC RBC-ENTMCNC: 22.6 PG — LOW (ref 27–31)
MCHC RBC-ENTMCNC: 31.8 G/DL — LOW (ref 32–37)
MCV RBC AUTO: 71 FL — LOW (ref 81–99)
MITOGEN IGNF BCKGRD COR BLD-ACNC: 2.86 IU/ML — SIGNIFICANT CHANGE UP
MITOGEN IGNF BCKGRD COR BLD-ACNC: 9.89 IU/ML — SIGNIFICANT CHANGE UP
MONOCYTES # BLD AUTO: 0.97 K/UL — HIGH (ref 0.1–0.6)
MONOCYTES NFR BLD AUTO: 8.1 % — SIGNIFICANT CHANGE UP (ref 1.7–9.3)
NEUTROPHILS # BLD AUTO: 6.75 K/UL — HIGH (ref 1.4–6.5)
NEUTROPHILS NFR BLD AUTO: 56.1 % — SIGNIFICANT CHANGE UP (ref 42.2–75.2)
NRBC # BLD: 0 /100 WBCS — SIGNIFICANT CHANGE UP (ref 0–0)
PLATELET # BLD AUTO: 446 K/UL — HIGH (ref 130–400)
POTASSIUM SERPL-MCNC: 4.3 MMOL/L — SIGNIFICANT CHANGE UP (ref 3.5–5)
POTASSIUM SERPL-SCNC: 4.3 MMOL/L — SIGNIFICANT CHANGE UP (ref 3.5–5)
PROT SERPL-MCNC: 7.3 G/DL — SIGNIFICANT CHANGE UP (ref 6–8)
RBC # BLD: 5 M/UL — SIGNIFICANT CHANGE UP (ref 4.2–5.4)
RBC # FLD: 15 % — HIGH (ref 11.5–14.5)
SODIUM SERPL-SCNC: 135 MMOL/L — SIGNIFICANT CHANGE UP (ref 135–146)
WBC # BLD: 12.02 K/UL — HIGH (ref 4.8–10.8)
WBC # FLD AUTO: 12.02 K/UL — HIGH (ref 4.8–10.8)

## 2018-08-06 RX ADMIN — AMPICILLIN SODIUM AND SULBACTAM SODIUM 200 GRAM(S): 250; 125 INJECTION, POWDER, FOR SUSPENSION INTRAMUSCULAR; INTRAVENOUS at 20:22

## 2018-08-06 RX ADMIN — AMPICILLIN SODIUM AND SULBACTAM SODIUM 200 GRAM(S): 250; 125 INJECTION, POWDER, FOR SUSPENSION INTRAMUSCULAR; INTRAVENOUS at 02:26

## 2018-08-06 RX ADMIN — AMPICILLIN SODIUM AND SULBACTAM SODIUM 200 GRAM(S): 250; 125 INJECTION, POWDER, FOR SUSPENSION INTRAMUSCULAR; INTRAVENOUS at 06:45

## 2018-08-06 RX ADMIN — AMPICILLIN SODIUM AND SULBACTAM SODIUM 200 GRAM(S): 250; 125 INJECTION, POWDER, FOR SUSPENSION INTRAMUSCULAR; INTRAVENOUS at 12:30

## 2018-08-06 NOTE — PROGRESS NOTE ADULT - SUBJECTIVE AND OBJECTIVE BOX
SUBJECTIVE:    Patient is a 38y old Female who presents with a chief complaint of PNA / R/o Tb (04 Aug 2018 02:13)    Currently admitted to medicine with the primary diagnosis of Left lower love Pneumonia with cavitary lesion     This morning she is resting comfortably in bed    PAST MEDICAL & SURGICAL HISTORY  Migraine  Pulmonary embolism  No significant past surgical history    SOCIAL HISTORY:  Negative for smoking/alcohol/drug use.     Home Medications:  Home Medications:      ALLERGIES:  No Known Allergies    MEDICATIONS:  STANDING MEDICATIONS  ampicillin/sulbactam  IVPB 3 Gram(s) IV Intermittent every 6 hours  enoxaparin Injectable 40 milliGRAM(s) SubCutaneous every 24 hours    PRN MEDICATIONS  acetaminophen   Tablet 650 milliGRAM(s) Oral every 6 hours PRN    VITALS:   Vital Signs Last 24 Hrs  T(C): 36.7 (06 Aug 2018 07:15), Max: 37.1 (05 Aug 2018 15:17)  T(F): 98 (06 Aug 2018 07:15), Max: 98.7 (05 Aug 2018 15:17)  HR: 75 (06 Aug 2018 07:15) (75 - 83)  BP: 142/57 (06 Aug 2018 07:15) (103/56 - 142/57)  RR: 20 (06 Aug 2018 07:15) (18 - 20)  SpO2: 99% (06 Aug 2018 07:15) (99% - 100%)  LABS:                          10.7   10.50 )-----------( 397      ( 05 Aug 2018 07:26 )             34.0     08-05    140  |  103  |  7<L>  ----------------------------<  86  4.4   |  21  |  0.7    Ca    8.8      05 Aug 2018 07:26    TPro  6.9  /  Alb  3.5  /  TBili  0.2  /  DBili  x   /  AST  15  /  ALT  13  /  AlkPhos  71  08-05    Sedimentation Rate, Erythrocyte: 58 mm/hr <H> (08-04-18 @ 16:52)    RADIOLOGY:  < from: CT Chest w/ IV Cont (08.03.18 @ 23:40) >  1.  When compared to prior 2017 studies, there is increased cavitation   within the left lower lobe with increased bronchiectasis and adjacent   patchy confluent opacities as well as peribronchiolar nodules. Correlate   for superimposed infection.    < end of copied text >    PHYSICAL EXAM:  GEN: No acute distress  LUNGS/ pulmonary : Rales appreciated on Left  HEART/ cvs : S1/S2 present. RRR.   ABD: Soft, non-tender, non-distended. Bowel sounds present  EXT: NC/NC/NE/2+PP/SILVA  NEURO: AAOX3

## 2018-08-06 NOTE — PROGRESS NOTE ADULT - ASSESSMENT
38 F w/ a pmh of neurofibromatosis, migraines, & a PE in 2016 who is sent in to the ED by her PMD for persistent pneumonia sxs (non-prod cough, on and off fevers, sob, fatigue) despite antibiotic treatment. O/p cxr revealed cavitary lesions & she was sent in by pmd to r/o tb as well.     # Cough, SOB, and fatigue r/o persistent PNA  - on Admission WBC 12, Afebrile and vitals stable  - CT chest shows increased cavitation within the LLL  - quantiferon, sputum induction & culture for afb pending  - id consult recommended Only Unasyn and Unlikely TB. D/C Isolation  - pulm consult Pending  -  Blood culture neg x2    # DVT ppx: Lovenox  # Regular diet  # Full code

## 2018-08-06 NOTE — PROGRESS NOTE ADULT - SUBJECTIVE AND OBJECTIVE BOX
SUBJECTIVE:    Patient is a 38y old Female who presents with a chief complaint of PNA / R/o Tb (04 Aug 2018 02:13)    Currently admitted to medicine with the primary diagnosis of Pneumonia     Today is hospital day 2d. This morning she is resting comfortably in bed and reports no new issues or overnight events.     PAST MEDICAL & SURGICAL HISTORY  Migraine  Pulmonary embolism  No significant past surgical history    SOCIAL HISTORY:  Negative for smoking/alcohol/drug use.     Home Medications:  Home Medications:      ALLERGIES:  No Known Allergies    MEDICATIONS:  STANDING MEDICATIONS  ampicillin/sulbactam  IVPB 3 Gram(s) IV Intermittent every 6 hours  enoxaparin Injectable 40 milliGRAM(s) SubCutaneous every 24 hours    PRN MEDICATIONS  acetaminophen   Tablet 650 milliGRAM(s) Oral every 6 hours PRN    VITALS:   Vital Signs Last 24 Hrs  T(C): 36.7 (06 Aug 2018 07:15), Max: 37.1 (05 Aug 2018 15:17)  T(F): 98 (06 Aug 2018 07:15), Max: 98.7 (05 Aug 2018 15:17)  HR: 75 (06 Aug 2018 07:15) (75 - 83)  BP: 142/57 (06 Aug 2018 07:15) (103/56 - 142/57)  BP(mean): --  RR: 20 (06 Aug 2018 07:15) (18 - 20)  SpO2: 99% (06 Aug 2018 07:15) (99% - 100%)  CAPILLARY BLOOD GLUCOSE          LABS:                        11.3   12.02 )-----------( 446      ( 06 Aug 2018 06:41 )             35.5     08-06    135  |  98  |  6<L>  ----------------------------<  87  4.3   |  23  |  0.6<L>    Ca    9.2      06 Aug 2018 06:41    TPro  7.3  /  Alb  3.8  /  TBili  <0.2  /  DBili  x   /  AST  19  /  ALT  14  /  AlkPhos  73  08-06    Culture - Blood (collected 04 Aug 2018 12:57)  Source: .Blood None  Preliminary Report (05 Aug 2018 18:01):    No growth to date.    Culture - Blood (collected 04 Aug 2018 07:15)  Source: .Blood Blood  Preliminary Report (05 Aug 2018 15:02):    No growth to date.    RADIOLOGY:    PHYSICAL EXAM:  GEN: No acute distress  LUNGS: Clear to auscultation bilaterally   HEART: S1/S2 present. RRR.   ABD: Soft, non-tender, non-distended. Bowel sounds present  EXT: NC/NC/NE/2+PP/SILVA  NEURO: AAOX3

## 2018-08-06 NOTE — CONSULT NOTE ADULT - SUBJECTIVE AND OBJECTIVE BOX
Patient is a 38y old  Female who presents with a chief complaint of PNA / R/o Tb (04 Aug 2018 02:13)      HPI:  Patient is a 38 F w/ a pmh of neurofibromatosis, migraines, & a PE in 2016 who is sent in to the ED by her PMD for persistent pneumonia. The patient began having a non-productive cough & fever on 7/17 & went to urgent care & was prescribed a zpak & amoxicillin for 3 days. She continued to be symptomatic and went to her pmd who dx her w/ pna & prescribed her Levaquin for 10 days. The pt returned to her pmd w/out improvement & a repeat cxr showed a cavitary lesion on the left side and her pmd sent her to ED to r/o TB. ROS is + for SOB, fatigue, tmax of 101.3, denies night sweats or chills or cp, n/v or diarrhea. Pt denies any travel history, has never left the [place], denies any contact w/ anyone w/ TB.  Side note, pt was discharged on eliquis in 2016 for pe, and had stopped taking it on her own. (04 Aug 2018 02:13)    Since admission, patient has been seen by ID. No concern for TB at this time      PAST MEDICAL & SURGICAL HISTORY:  Migraine  Pulmonary embolism  No significant past surgical history      SOCIAL HX:   Smoking                         ETOH                            Other    FAMILY HISTORY:  No pertinent family history in first degree relatives  .  No cardiovascular or pulmonary family history     Review of System:  See HPI    Allergies    No Known Allergies    Intolerances          PHYSICAL EXAM  Vital Signs Last 24 Hrs  T(C): 36.7 (06 Aug 2018 07:15), Max: 37.1 (05 Aug 2018 15:17)  T(F): 98 (06 Aug 2018 07:15), Max: 98.7 (05 Aug 2018 15:17)  HR: 75 (06 Aug 2018 07:15) (75 - 83)  BP: 142/57 (06 Aug 2018 07:15) (103/56 - 142/57)  BP(mean): --  RR: 20 (06 Aug 2018 07:15) (18 - 20)  SpO2: 99% (06 Aug 2018 07:15) (99% - 100%)    General: In NAD  HEENT: JORDON             Lymphatic system: No cervical LN     Lungs: Rick BS  Cardiovascular: Regular  Gastrointestinal: Soft.  + BS   Musculoskeletal: No Clubbing.  Full range of motion.. Moves all extremities  Skin: Warm.  Intact  Neurological: No motor or sensory deficit       LABS:                          11.3   12.02 )-----------( 446      ( 06 Aug 2018 06:41 )             35.5                                               08-06    135  |  98  |  6<L>  ----------------------------<  87  4.3   |  23  |  0.6<L>    Ca    9.2      06 Aug 2018 06:41    TPro  7.3  /  Alb  3.8  /  TBili  <0.2  /  DBili  x   /  AST  19  /  ALT  14  /  AlkPhos  73  08-06                                                                                           LIVER FUNCTIONS - ( 06 Aug 2018 06:41 )  Alb: 3.8 g/dL / Pro: 7.3 g/dL / ALK PHOS: 73 U/L / ALT: 14 U/L / AST: 19 U/L / GGT: x                                                  Culture - Blood (collected 04 Aug 2018 12:57)  Source: .Blood None  Preliminary Report (05 Aug 2018 18:01):    No growth to date.    Culture - Blood (collected 04 Aug 2018 07:15)  Source: .Blood Blood  Preliminary Report (05 Aug 2018 15:02):    No growth to date.                                                    MEDICATIONS  (STANDING):  ampicillin/sulbactam  IVPB 3 Gram(s) IV Intermittent every 6 hours  enoxaparin Injectable 40 milliGRAM(s) SubCutaneous every 24 hours    MEDICATIONS  (PRN):  acetaminophen   Tablet 650 milliGRAM(s) Oral every 6 hours PRN For Temp greater than 38 C (100.4 F) Patient is a 38y old  Female who presents with a chief complaint of PNA / R/o Tb (04 Aug 2018 02:13)      HPI:  Patient is a 38 F w/ a pmh of neurofibromatosis, migraines, & a PE in 2016 who is sent in to the ED by her PMD for persistent pneumonia. The patient began having a non-productive cough & fever on 7/17 & went to urgent care & was prescribed a zpak & amoxicillin for 3 days. She continued to be symptomatic and went to her pmd who dx her w/ pna & prescribed her Levaquin for 10 days. The pt returned to her pmd w/out improvement & a repeat cxr showed a cavitary lesion on the left side and her pmd sent her to ED to r/o TB. ROS is + for SOB, fatigue, tmax of 101.3, denies night sweats or chills or cp, n/v or diarrhea. Pt denies any travel history, has never left the [place], denies any contact w/ anyone w/ TB.  Side note, pt was discharged on eliquis in 2016 for pe, and had stopped taking it on her own. (04 Aug 2018 02:13)    Since admission, patient has been seen by ID. No concern for TB at this time. CT chest in November 2017 showed evidence of persistent/chronic embolus within a left lower lobe segmental pulmonary artery branch (since July 2016) with associated multiple left lower lobe chronic parenchymal infarcts with several demonstrating central cavitation. Superimposed infectious process such as aspergilloma is difficult to exclude. CT Chest in July 2016 showed evidence of Left lower lobe consolidation with surrounding ground glass opacity, which may reflect pulmonary infarct, versus less likely pneumonia or hemorrhage. Patient denies any major illness since 2016 till current admission. Says that she has otherwise been generally well and stopped taking Eliquis on her own.       PAST MEDICAL & SURGICAL HISTORY:  Migraine  Pulmonary embolism  No significant past surgical history      SOCIAL HX:   Smoking       Never smoker                  ETOH        Denies EtOH use                    Other    FAMILY HISTORY:  No pertinent family history in first degree relatives  .  No cardiovascular or pulmonary family history     Review of System:  See HPI    Allergies    No Known Allergies    Intolerances          PHYSICAL EXAM  Vital Signs Last 24 Hrs  T(C): 36.7 (06 Aug 2018 07:15), Max: 37.1 (05 Aug 2018 15:17)  T(F): 98 (06 Aug 2018 07:15), Max: 98.7 (05 Aug 2018 15:17)  HR: 75 (06 Aug 2018 07:15) (75 - 83)  BP: 142/57 (06 Aug 2018 07:15) (103/56 - 142/57)  BP(mean): --  RR: 20 (06 Aug 2018 07:15) (18 - 20)  SpO2: 99% (06 Aug 2018 07:15) (99% - 100%)    General: In NAD  HEENT: JORDON             Lymphatic system: No cervical LN     Lungs: Rick BS  Cardiovascular: Regular  Gastrointestinal: Soft.  + BS   Musculoskeletal: No Clubbing.  Full range of motion.. Moves all extremities  Skin: Warm.  Intact  Neurological: No motor or sensory deficit       LABS:                          11.3   12.02 )-----------( 446      ( 06 Aug 2018 06:41 )             35.5                                               08-06    135  |  98  |  6<L>  ----------------------------<  87  4.3   |  23  |  0.6<L>    Ca    9.2      06 Aug 2018 06:41    TPro  7.3  /  Alb  3.8  /  TBili  <0.2  /  DBili  x   /  AST  19  /  ALT  14  /  AlkPhos  73  08-06                                                                                           LIVER FUNCTIONS - ( 06 Aug 2018 06:41 )  Alb: 3.8 g/dL / Pro: 7.3 g/dL / ALK PHOS: 73 U/L / ALT: 14 U/L / AST: 19 U/L / GGT: x                                                  Culture - Blood (collected 04 Aug 2018 12:57)  Source: .Blood None  Preliminary Report (05 Aug 2018 18:01):    No growth to date.    Culture - Blood (collected 04 Aug 2018 07:15)  Source: .Blood Blood  Preliminary Report (05 Aug 2018 15:02):    No growth to date.                                                    MEDICATIONS  (STANDING):  ampicillin/sulbactam  IVPB 3 Gram(s) IV Intermittent every 6 hours  enoxaparin Injectable 40 milliGRAM(s) SubCutaneous every 24 hours    MEDICATIONS  (PRN):  acetaminophen   Tablet 650 milliGRAM(s) Oral every 6 hours PRN For Temp greater than 38 C (100.4 F)    < from: CT Chest w/ IV Cont (08.03.18 @ 23:40) >  1.  When compared to prior 2017 studies, there is increased cavitation   within the left lower lobe with increased bronchiectasis and adjacent   patchy confluent opacities as well as peribronchiolar nodules. Correlate   for superimposed infection.    < end of copied text >

## 2018-08-06 NOTE — CONSULT NOTE ADULT - ASSESSMENT
Patient is a 38 F w/ a PMHx of neurofibromatosis, migraines, & a PE in 2016 who is sent in to the ED by her PMD for persistent pneumonia. Patient is a 38 F w/ a PMHx of neurofibromatosis, migraines, & a PE in 2016 who is sent in to the ED by her PMD for persistent pneumonia.     Assessment:   LLL Cavitary Lesion/ PNA    Plan:  Possible Bronchoscopy  Cont Abx as per ID  Pulm will follow Patient is a 38 F w/ a PMHx of neurofibromatosis, migraines, & a PE in 2016 who is sent in to the ED by her PMD for persistent pneumonia.     Assessment:     LLL Cavitary Lesion/ PNA/ worsening    Plan:    Bronchoscopy  Cont Abx   Pulm will follow

## 2018-08-06 NOTE — PROGRESS NOTE ADULT - ASSESSMENT
38 F w/ a pmh of neurofibromatosis, migraines, & a PE in 2016 who is sent in to the ED by her PMD for persistent pneumonia sxs (non-prod cough, on and off fevers, sob, fatigue) despite antibiotic treatment. O/p cxr revealed cavitary lesions & she was sent in by pmd to r/o tb as well.     # Cough, SOB, and fatigue r/o persistent PNA  - on Admission WBC 12, Afebrile and vitals stable  - CT chest shows increased cavitation within the LLL  - quantiferon, sputum induction & culture for afb pending  - id consult recommended Only Unasyn and Unlikely TB. D/C Isolation    - pulm consult Pending, need may be benefited with biopsy to R/O fungal infections ?   - f/u Blood culture    # DVT ppx: Lovenox  # Regular diet  # Full code    Will follow up with Pulmonary for further determinations

## 2018-08-07 LAB
ANION GAP SERPL CALC-SCNC: 17 MMOL/L — HIGH (ref 7–14)
BASOPHILS # BLD AUTO: 0.1 K/UL — SIGNIFICANT CHANGE UP (ref 0–0.2)
BASOPHILS NFR BLD AUTO: 0.9 % — SIGNIFICANT CHANGE UP (ref 0–1)
BUN SERPL-MCNC: 8 MG/DL — LOW (ref 10–20)
CALCIUM SERPL-MCNC: 9.1 MG/DL — SIGNIFICANT CHANGE UP (ref 8.5–10.1)
CHLORIDE SERPL-SCNC: 97 MMOL/L — LOW (ref 98–110)
CO2 SERPL-SCNC: 22 MMOL/L — SIGNIFICANT CHANGE UP (ref 17–32)
CREAT SERPL-MCNC: 0.6 MG/DL — LOW (ref 0.7–1.5)
DSDNA AB SER-ACNC: <12 IU/ML — SIGNIFICANT CHANGE UP
EOSINOPHIL # BLD AUTO: 0.82 K/UL — HIGH (ref 0–0.7)
EOSINOPHIL NFR BLD AUTO: 7.5 % — SIGNIFICANT CHANGE UP (ref 0–8)
GLUCOSE SERPL-MCNC: 91 MG/DL — SIGNIFICANT CHANGE UP (ref 70–99)
HCT VFR BLD CALC: 35 % — LOW (ref 37–47)
HGB BLD-MCNC: 11.1 G/DL — LOW (ref 12–16)
IMM GRANULOCYTES NFR BLD AUTO: 0.4 % — HIGH (ref 0.1–0.3)
LYMPHOCYTES # BLD AUTO: 3.3 K/UL — SIGNIFICANT CHANGE UP (ref 1.2–3.4)
LYMPHOCYTES # BLD AUTO: 30.2 % — SIGNIFICANT CHANGE UP (ref 20.5–51.1)
MCHC RBC-ENTMCNC: 22.6 PG — LOW (ref 27–31)
MCHC RBC-ENTMCNC: 31.7 G/DL — LOW (ref 32–37)
MCV RBC AUTO: 71.3 FL — LOW (ref 81–99)
MONOCYTES # BLD AUTO: 0.98 K/UL — HIGH (ref 0.1–0.6)
MONOCYTES NFR BLD AUTO: 9 % — SIGNIFICANT CHANGE UP (ref 1.7–9.3)
NEUTROPHILS # BLD AUTO: 5.68 K/UL — SIGNIFICANT CHANGE UP (ref 1.4–6.5)
NEUTROPHILS NFR BLD AUTO: 52 % — SIGNIFICANT CHANGE UP (ref 42.2–75.2)
NRBC # BLD: 0 /100 WBCS — SIGNIFICANT CHANGE UP (ref 0–0)
PLATELET # BLD AUTO: 428 K/UL — HIGH (ref 130–400)
POTASSIUM SERPL-MCNC: 4.5 MMOL/L — SIGNIFICANT CHANGE UP (ref 3.5–5)
POTASSIUM SERPL-SCNC: 4.5 MMOL/L — SIGNIFICANT CHANGE UP (ref 3.5–5)
RBC # BLD: 4.91 M/UL — SIGNIFICANT CHANGE UP (ref 4.2–5.4)
RBC # FLD: 14.9 % — HIGH (ref 11.5–14.5)
SODIUM SERPL-SCNC: 136 MMOL/L — SIGNIFICANT CHANGE UP (ref 135–146)
WBC # BLD: 10.92 K/UL — HIGH (ref 4.8–10.8)
WBC # FLD AUTO: 10.92 K/UL — HIGH (ref 4.8–10.8)

## 2018-08-07 RX ADMIN — ENOXAPARIN SODIUM 40 MILLIGRAM(S): 100 INJECTION SUBCUTANEOUS at 05:24

## 2018-08-07 RX ADMIN — AMPICILLIN SODIUM AND SULBACTAM SODIUM 200 GRAM(S): 250; 125 INJECTION, POWDER, FOR SUSPENSION INTRAMUSCULAR; INTRAVENOUS at 05:24

## 2018-08-07 RX ADMIN — AMPICILLIN SODIUM AND SULBACTAM SODIUM 200 GRAM(S): 250; 125 INJECTION, POWDER, FOR SUSPENSION INTRAMUSCULAR; INTRAVENOUS at 13:32

## 2018-08-07 NOTE — PROGRESS NOTE ADULT - SUBJECTIVE AND OBJECTIVE BOX
JENNIFER, LUBA  38y, Female      OVERNIGHT EVENTS:  plan for bronch    ROS negative except as per above    VITALS:  T(F): 98.6, Max: 98.6 (08-07-18 @ 05:01)  HR: 72  BP: 111/54  RR: 17Vital Signs Last 24 Hrs  T(C): 37 (07 Aug 2018 05:01), Max: 37 (07 Aug 2018 05:01)  T(F): 98.6 (07 Aug 2018 05:01), Max: 98.6 (07 Aug 2018 05:01)  HR: 72 (07 Aug 2018 05:01) (72 - 95)  BP: 111/54 (07 Aug 2018 05:01) (102/55 - 119/66)  BP(mean): --  RR: 17 (07 Aug 2018 05:01) (17 - 20)  SpO2: 100% (06 Aug 2018 20:52) (100% - 100%)    PHYSICAL EXAM:  Gen: Awake and alert, non-toxic appearing, NAD  HEENT: NCAT. EOMI. MMM.   Neck: Supple, no cervical LAD  CV: RRR, no murmurs  Lungs: CTAB, no w/r/r  Abd: Soft. NTND  Extr: wwp, no edema  Skin: no rash, +NF  Neuro: No focal deficits  Lines: clean    TESTS & MEASUREMENTS:                        11.1   10.92 )-----------( 428      ( 07 Aug 2018 06:50 )             35.0     08-06    135  |  98  |  6<L>  ----------------------------<  87  4.3   |  23  |  0.6<L>    Ca    9.2      06 Aug 2018 06:41    TPro  7.3  /  Alb  3.8  /  TBili  <0.2  /  DBili  x   /  AST  19  /  ALT  14  /  AlkPhos  73  08-06    LIVER FUNCTIONS - ( 06 Aug 2018 06:41 )  Alb: 3.8 g/dL / Pro: 7.3 g/dL / ALK PHOS: 73 U/L / ALT: 14 U/L / AST: 19 U/L / GGT: x             Culture - Blood (collected 08-04-18 @ 12:57)  Source: .Blood None  Preliminary Report (08-05-18 @ 18:01):    No growth to date.    Culture - Blood (collected 08-04-18 @ 07:15)  Source: .Blood Blood  Preliminary Report (08-05-18 @ 15:02):    No growth to date.          RADIOLOGY & ADDITIONAL TESTS:    ANTIBIOTICS:  ampicillin/sulbactam  IVPB   200 mL/Hr IV Intermittent (08-07-18 @ 05:24)   200 mL/Hr IV Intermittent (08-06-18 @ 20:22)   200 mL/Hr IV Intermittent (08-06-18 @ 12:30)   200 mL/Hr IV Intermittent (08-06-18 @ 06:45)   200 mL/Hr IV Intermittent (08-06-18 @ 02:26)   200 mL/Hr IV Intermittent (08-05-18 @ 20:57)   200 mL/Hr IV Intermittent (08-05-18 @ 12:12)   200 mL/Hr IV Intermittent (08-05-18 @ 06:32)   200 mL/Hr IV Intermittent (08-05-18 @ 01:10)   200 mL/Hr IV Intermittent (08-04-18 @ 17:56)    cefepime   IVPB   100 mL/Hr IV Intermittent (08-04-18 @ 01:37)    ciprofloxacin   IVPB   200 mL/Hr IV Intermittent (08-04-18 @ 01:37)    levoFLOXacin IVPB   100 mL/Hr IV Intermittent (08-04-18 @ 08:29)    piperacillin/tazobactam IVPB.   25 mL/Hr IV Intermittent (08-04-18 @ 16:05)   25 mL/Hr IV Intermittent (08-04-18 @ 10:14)    vancomycin  IVPB   250 mL/Hr IV Intermittent (08-04-18 @ 14:17)   250 mL/Hr IV Intermittent (08-04-18 @ 06:26)        ampicillin/sulbactam  IVPB 3 Gram(s) IV Intermittent every 6 hours JENNIFER, LUBA  38y, Female      OVERNIGHT EVENTS:  plan for bronch  reports increased cough    ROS negative except as per above    VITALS:  T(F): 98.6, Max: 98.6 (08-07-18 @ 05:01)  HR: 72  BP: 111/54  RR: 17Vital Signs Last 24 Hrs  T(C): 37 (07 Aug 2018 05:01), Max: 37 (07 Aug 2018 05:01)  T(F): 98.6 (07 Aug 2018 05:01), Max: 98.6 (07 Aug 2018 05:01)  HR: 72 (07 Aug 2018 05:01) (72 - 95)  BP: 111/54 (07 Aug 2018 05:01) (102/55 - 119/66)  BP(mean): --  RR: 17 (07 Aug 2018 05:01) (17 - 20)  SpO2: 100% (06 Aug 2018 20:52) (100% - 100%)    PHYSICAL EXAM:  Gen: Awake and alert, non-toxic appearing, NAD  HEENT: NCAT. EOMI. MMM.   Neck: Supple, no cervical LAD  CV: RRR, no murmurs  Lungs: CTAB, no w/r/r  Abd: Soft. NTND  Extr: wwp, no edema  Skin: no rash, +NF  Neuro: No focal deficits  Lines: clean    TESTS & MEASUREMENTS:                        11.1   10.92 )-----------( 428      ( 07 Aug 2018 06:50 )             35.0     08-06    135  |  98  |  6<L>  ----------------------------<  87  4.3   |  23  |  0.6<L>    Ca    9.2      06 Aug 2018 06:41    TPro  7.3  /  Alb  3.8  /  TBili  <0.2  /  DBili  x   /  AST  19  /  ALT  14  /  AlkPhos  73  08-06    LIVER FUNCTIONS - ( 06 Aug 2018 06:41 )  Alb: 3.8 g/dL / Pro: 7.3 g/dL / ALK PHOS: 73 U/L / ALT: 14 U/L / AST: 19 U/L / GGT: x             Culture - Blood (collected 08-04-18 @ 12:57)  Source: .Blood None  Preliminary Report (08-05-18 @ 18:01):    No growth to date.    Culture - Blood (collected 08-04-18 @ 07:15)  Source: .Blood Blood  Preliminary Report (08-05-18 @ 15:02):    No growth to date.          RADIOLOGY & ADDITIONAL TESTS:    ANTIBIOTICS:  ampicillin/sulbactam  IVPB   200 mL/Hr IV Intermittent (08-07-18 @ 05:24)   200 mL/Hr IV Intermittent (08-06-18 @ 20:22)   200 mL/Hr IV Intermittent (08-06-18 @ 12:30)   200 mL/Hr IV Intermittent (08-06-18 @ 06:45)   200 mL/Hr IV Intermittent (08-06-18 @ 02:26)   200 mL/Hr IV Intermittent (08-05-18 @ 20:57)   200 mL/Hr IV Intermittent (08-05-18 @ 12:12)   200 mL/Hr IV Intermittent (08-05-18 @ 06:32)   200 mL/Hr IV Intermittent (08-05-18 @ 01:10)   200 mL/Hr IV Intermittent (08-04-18 @ 17:56)    cefepime   IVPB   100 mL/Hr IV Intermittent (08-04-18 @ 01:37)    ciprofloxacin   IVPB   200 mL/Hr IV Intermittent (08-04-18 @ 01:37)    levoFLOXacin IVPB   100 mL/Hr IV Intermittent (08-04-18 @ 08:29)    piperacillin/tazobactam IVPB.   25 mL/Hr IV Intermittent (08-04-18 @ 16:05)   25 mL/Hr IV Intermittent (08-04-18 @ 10:14)    vancomycin  IVPB   250 mL/Hr IV Intermittent (08-04-18 @ 14:17)   250 mL/Hr IV Intermittent (08-04-18 @ 06:26)        ampicillin/sulbactam  IVPB 3 Gram(s) IV Intermittent every 6 hours

## 2018-08-07 NOTE — PROGRESS NOTE ADULT - ASSESSMENT
Patient is a 38 F w/ a PMHx of neurofibromatosis, migraines, & a PE in 2016 who is sent in to the ED by her PMD for persistent pneumonia, QUANTERFERON NEG    Assessment:     LLL Cavitary Lesion/ PNA/ worsening    Plan:    Bronchoscopy IN AM  Cont Abx   will follow

## 2018-08-07 NOTE — PROGRESS NOTE ADULT - ASSESSMENT
38 F w/ a pmh of neurofibromatosis, migraines, & an unprovoked PE in 2016 sent in to the ED by her PMD for persistent pneumonia. The pts began having a non-productive cough & fever on 7/17 s/p zpak and levaquin found to have a LLL cavitary lesion    No risk factors for TB and previous changes on CT LLL in 2016 during PE diagnosis. Would be strange to have basilar TB disease.  Cavity related to ?Neurofibromatosis ?ruptured bullae/cyst? or infarct in the setting of PE since changes are apparent on CT Chest since that time  Quantiferon gold negative    sepsis ruled out on admission    - Consult Pulm- plan for bronch  - unasyn 3g q6h        Spectra [phone] 38 F w/ a pmh of neurofibromatosis, migraines, & an unprovoked PE in 2016 sent in to the ED by her PMD for persistent pneumonia. The pts began having a non-productive cough & fever on 7/17 s/p zpak and levaquin found to have a progressive LLL cavitary lesion (seen on imaging 2016)    No risk factors for TB  Cavity related to ?Neurofibromatosis ?ruptured bullae/cyst? or infarct in the setting of PE since changes are apparent on CT Chest since that time  Quantiferon gold negative    sepsis ruled out on admission    - Consult Pulm- plan for bronch  - unasyn 3g q6h        Spectra [phone]

## 2018-08-07 NOTE — PROGRESS NOTE ADULT - ASSESSMENT
Assessment:  Patient is a 38 F w/ a pmh of neurofibromatosis, migraines, & a PE in 2016 who is sent to the ED by her PMD for persistent pneumonia sxs (non-prod cough, on and off fevers, sob, fatigue) despite antibiotic treatment. O/p cxr revealed cavitary lesions & she was sent in by pmd to r/o tb as well.     # Cough, SOB, and fatigue r/o persistent PNA  - WBC 10.9 today (from 12 on admission), afebrile and vitals stable  - elevated ESR, CRP, C3 and C4 noted on labs  - CT chest shows increased airspace opacities in the LLL  - sputum induction & culture for afb pending, autoimmune workup pending, MRSA nasal and blood culture pending  - continue Unasyn as per ID  - pulm scheduled a bronchoscopy for 8 AM tomorrow, pt will be NPO after midnight  -  Blood culture neg x2, quantiferon neg    # DVT ppx: Lovenox  # Regular diet  # Full code Assessment:  Patient is a 38 F w/ a pmh of neurofibromatosis, migraines, & a PE in 2016 who is sent to the ED by her PMD for persistent pneumonia sxs (non-prod cough, on and off fevers, sob, fatigue) despite antibiotic treatment. O/p cxr revealed cavitary lesions & she was sent in by pmd to r/o tb as well.     # Pneumonia/ caviatry lesion  - WBC 10.9 today (from 12 on admission), afebrile and vitals stable  - elevated ESR, CRP, C3 and C4 noted on labs  - CT chest shows increased airspace opacities in the LLL  - sputum induction & culture for afb pending, autoimmune workup pending, MRSA nasal and blood culture pending  - continue Unasyn as per ID  - pulm scheduled a bronchoscopy for 8 AM tomorrow, pt will be NPO after midnight  -  Blood culture neg x2, quantiferon neg    #neurofibromatosis  stable  outpt f/u    #PE  s/p AC    # DVT ppx: Lovenox  # Regular diet  # Full code

## 2018-08-07 NOTE — PROGRESS NOTE ADULT - SUBJECTIVE AND OBJECTIVE BOX
SUBJECTIVE:    Patient is a 38y old Female who presents with a chief complaint of PNA / R/o Tb (04 Aug 2018 02:13)    Currently admitted to medicine with the primary diagnosis of Pneumonia.     Today is hospital day 3d. This morning she is resting comfortably in bed and reports no new issues or overnight events.     PAST MEDICAL & SURGICAL HISTORY  Migraine  Pulmonary embolism  No significant past surgical history    SOCIAL HISTORY:  Negative for smoking/alcohol/drug use.     ALLERGIES:  No Known Allergies    MEDICATIONS:  STANDING MEDICATIONS  ampicillin/sulbactam  IVPB 3 Gram(s) IV Intermittent every 6 hours  enoxaparin Injectable 40 milliGRAM(s) SubCutaneous every 24 hours    PRN MEDICATIONS  acetaminophen   Tablet 650 milliGRAM(s) Oral every 6 hours PRN    VITALS:   T(F): 98.6  HR: 72  BP: 111/54  RR: 17  SpO2: 100%    LABS:                        11.1   10.92 )-----------( 428      ( 07 Aug 2018 06:50 )             35.0     08-06    135  |  98  |  6<L>  ----------------------------<  87  4.3   |  23  |  0.6<L>    Ca    9.2      06 Aug 2018 06:41    TPro  7.3  /  Alb  3.8  /  TBili  <0.2  /  DBili  x   /  AST  19  /  ALT  14  /  AlkPhos  73  08-06        Culture - Blood (collected 04 Aug 2018 12:57)  Source: .Blood None  Preliminary Report (05 Aug 2018 18:01):    No growth to date.      C4 Complement, Serum (08.04.18 @ 16:52)    C4 Complement, Serum: 40:     C3 Complement, Serum (08.04.18 @ 16:52)    C3 Complement, Serum: 192:    Sedimentation Rate, Erythrocyte: 58 mm/hr (08.04.18 @ 16:52)    C-Reactive Protein, Serum: 4.25 mg/dL (08.04.18 @ 16:52)                RADIOLOGY:     EXAM:  CT CHEST IC        PROCEDURE DATE:  08/03/2018    1.  When compared to prior 2017 studies, there is increased cavitation   within the left lower lobe with increased bronchiectasis and adjacent   patchy confluent opacities as well as peribronchiolar nodules. Correlate   for superimposed infection.      PHYSICAL EXAM:  GEN: No acute distress, afebrile  LUNGS: Decreased BS in LLL with dullness to percussion, CTA on R  HEART: S1/S2 present. RRR.   ABD: Soft, non-tender, non-distended. Bowel sounds present  EXT: NC/NC/NE/2+PP/SILVA/Skin Intact.   NEURO: AAOX3 SUBJECTIVE:    Patient is a 38y old Female who presents with a chief complaint of PNA / R/o Tb (04 Aug 2018 02:13)    Currently admitted to medicine with the primary diagnosis of Pneumonia.     Today is hospital day 3d. This morning she is resting comfortably in bed and reports no new issues or overnight events. no cp, fever, dysuria, diarrhea.    PAST MEDICAL & SURGICAL HISTORY  Migraine  Pulmonary embolism  No significant past surgical history    SOCIAL HISTORY:  Negative for smoking/alcohol/drug use.     ALLERGIES:  No Known Allergies    MEDICATIONS:  STANDING MEDICATIONS  ampicillin/sulbactam  IVPB 3 Gram(s) IV Intermittent every 6 hours  enoxaparin Injectable 40 milliGRAM(s) SubCutaneous every 24 hours    PRN MEDICATIONS  acetaminophen   Tablet 650 milliGRAM(s) Oral every 6 hours PRN    VITALS:   T(F): 98.6  HR: 72  BP: 111/54  RR: 17  SpO2: 100%    LABS:                        11.1   10.92 )-----------( 428      ( 07 Aug 2018 06:50 )             35.0     08-06    135  |  98  |  6<L>  ----------------------------<  87  4.3   |  23  |  0.6<L>    Ca    9.2      06 Aug 2018 06:41    TPro  7.3  /  Alb  3.8  /  TBili  <0.2  /  DBili  x   /  AST  19  /  ALT  14  /  AlkPhos  73  08-06        Culture - Blood (collected 04 Aug 2018 12:57)  Source: .Blood None  Preliminary Report (05 Aug 2018 18:01):    No growth to date.      C4 Complement, Serum (08.04.18 @ 16:52)    C4 Complement, Serum: 40:     C3 Complement, Serum (08.04.18 @ 16:52)    C3 Complement, Serum: 192:    Sedimentation Rate, Erythrocyte: 58 mm/hr (08.04.18 @ 16:52)    C-Reactive Protein, Serum: 4.25 mg/dL (08.04.18 @ 16:52)                RADIOLOGY:     EXAM:  CT CHEST IC        PROCEDURE DATE:  08/03/2018    1.  When compared to prior 2017 studies, there is increased cavitation   within the left lower lobe with increased bronchiectasis and adjacent   patchy confluent opacities as well as peribronchiolar nodules. Correlate   for superimposed infection.      PHYSICAL EXAM:  GEN: No acute distress, afebrile  LUNGS: Decreased BS in LLL with dullness to percussion, CTA on R  HEART: S1/S2 present. RRR.   ABD: Soft, non-tender, non-distended. Bowel sounds present  EXT: NC/NC/NE/2+PP/SILVA/Skin Intact.   NEURO: AAOX3

## 2018-08-07 NOTE — PROGRESS NOTE ADULT - SUBJECTIVE AND OBJECTIVE BOX
CHIEF COMPLIANT: COUGH    OVERNIGHT EVENTS: STILL COUGHING, NO FEVER    Vital Signs Last 24 Hrs  T(C): 37 (07 Aug 2018 05:01), Max: 37 (07 Aug 2018 05:01)  T(F): 98.6 (07 Aug 2018 05:01), Max: 98.6 (07 Aug 2018 05:01)  HR: 72 (07 Aug 2018 05:01) (72 - 95)  BP: 111/54 (07 Aug 2018 05:01) (102/55 - 119/66)  BP(mean): --  RR: 17 (07 Aug 2018 05:01) (17 - 20)  SpO2: 100% (06 Aug 2018 20:52) (100% - 100%)    PHYSICAL EXAMINATION:    GENERAL: The patient is awake and alert in no apparent distress.     HEENT: Head is normocephalic and atraumatic. Extraocular muscles are intact. Mucous membranes are moist.    NECK: Supple.    LUNGS: DEC BS L SIDE    HEART: Regular rate and rhythm without murmur.    ABDOMEN: Soft, nontender, and nondistended.      EXTREMITIES: Without any cyanosis, clubbing, rash, lesions or edema.    NEUROLOGIC: Grossly intact.    SKIN: No ulceration or induration present.      LABS:                        11.1   10.92 )-----------( 428      ( 07 Aug 2018 06:50 )             35.0     08-06    135  |  98  |  6<L>  ----------------------------<  87  4.3   |  23  |  0.6<L>    Ca    9.2      06 Aug 2018 06:41    TPro  7.3  /  Alb  3.8  /  TBili  <0.2  /  DBili  x   /  AST  19  /  ALT  14  /  AlkPhos  73  08-06 08-07-18 @ 07:01  -  08-07-18 @ 09:16  --------------------------------------------------------  IN: 360 mL / OUT: 0 mL / NET: 360 mL        MICROBIOLOGY:  Culture Results:   No growth to date. (08-04 @ 12:57)  Culture Results:   No growth to date. (08-04 @ 07:15)      MEDICATIONS  (STANDING):  ampicillin/sulbactam  IVPB 3 Gram(s) IV Intermittent every 6 hours  enoxaparin Injectable 40 milliGRAM(s) SubCutaneous every 24 hours    MEDICATIONS  (PRN):  acetaminophen   Tablet 650 milliGRAM(s) Oral every 6 hours PRN For Temp greater than 38 C (100.4 F)      RADIOLOGY & ADDITIONAL STUDIES:

## 2018-08-08 ENCOUNTER — RESULT REVIEW (OUTPATIENT)
Age: 38
End: 2018-08-08

## 2018-08-08 LAB
ANA PAT FLD IF-IMP: ABNORMAL
ANA TITR SER: ABNORMAL
ANION GAP SERPL CALC-SCNC: 15 MMOL/L — HIGH (ref 7–14)
BUN SERPL-MCNC: 5 MG/DL — LOW (ref 10–20)
CALCIUM SERPL-MCNC: 9.6 MG/DL — SIGNIFICANT CHANGE UP (ref 8.5–10.1)
CHLORIDE SERPL-SCNC: 99 MMOL/L — SIGNIFICANT CHANGE UP (ref 98–110)
CO2 SERPL-SCNC: 23 MMOL/L — SIGNIFICANT CHANGE UP (ref 17–32)
CREAT SERPL-MCNC: 0.6 MG/DL — LOW (ref 0.7–1.5)
GLUCOSE SERPL-MCNC: 81 MG/DL — SIGNIFICANT CHANGE UP (ref 70–99)
GRAM STN FLD: SIGNIFICANT CHANGE UP
HCT VFR BLD CALC: 37.4 % — SIGNIFICANT CHANGE UP (ref 37–47)
HGB BLD-MCNC: 11.6 G/DL — LOW (ref 12–16)
MCHC RBC-ENTMCNC: 22.1 PG — LOW (ref 27–31)
MCHC RBC-ENTMCNC: 31 G/DL — LOW (ref 32–37)
MCV RBC AUTO: 71.4 FL — LOW (ref 81–99)
NRBC # BLD: 0 /100 WBCS — SIGNIFICANT CHANGE UP (ref 0–0)
PLATELET # BLD AUTO: 430 K/UL — HIGH (ref 130–400)
POTASSIUM SERPL-MCNC: 4.3 MMOL/L — SIGNIFICANT CHANGE UP (ref 3.5–5)
POTASSIUM SERPL-SCNC: 4.3 MMOL/L — SIGNIFICANT CHANGE UP (ref 3.5–5)
RBC # BLD: 5.24 M/UL — SIGNIFICANT CHANGE UP (ref 4.2–5.4)
RBC # FLD: 15 % — HIGH (ref 11.5–14.5)
SODIUM SERPL-SCNC: 137 MMOL/L — SIGNIFICANT CHANGE UP (ref 135–146)
SPECIMEN SOURCE: SIGNIFICANT CHANGE UP
WBC # BLD: 9.57 K/UL — SIGNIFICANT CHANGE UP (ref 4.8–10.8)
WBC # FLD AUTO: 9.57 K/UL — SIGNIFICANT CHANGE UP (ref 4.8–10.8)

## 2018-08-08 RX ADMIN — AMPICILLIN SODIUM AND SULBACTAM SODIUM 200 GRAM(S): 250; 125 INJECTION, POWDER, FOR SUSPENSION INTRAMUSCULAR; INTRAVENOUS at 05:11

## 2018-08-08 RX ADMIN — AMPICILLIN SODIUM AND SULBACTAM SODIUM 200 GRAM(S): 250; 125 INJECTION, POWDER, FOR SUSPENSION INTRAMUSCULAR; INTRAVENOUS at 18:56

## 2018-08-08 RX ADMIN — AMPICILLIN SODIUM AND SULBACTAM SODIUM 200 GRAM(S): 250; 125 INJECTION, POWDER, FOR SUSPENSION INTRAMUSCULAR; INTRAVENOUS at 01:51

## 2018-08-08 RX ADMIN — AMPICILLIN SODIUM AND SULBACTAM SODIUM 200 GRAM(S): 250; 125 INJECTION, POWDER, FOR SUSPENSION INTRAMUSCULAR; INTRAVENOUS at 11:46

## 2018-08-08 NOTE — MEDICAL STUDENT PROGRESS NOTE(EDUCATION) - NS MD HP STUD ASPLAN ASSES FT
Assessment:  Patient is a 38 F w/ a pmh of neurofibromatosis, migraines, & a PE in 2016 who is here for persistent pneumonia despite antibiotic treatment. O/p cxr revealed LLL cavitary lesion & she was sent in by pmd to r/o TB as well.

## 2018-08-08 NOTE — MEDICAL STUDENT PROGRESS NOTE(EDUCATION) - SUBJECTIVE AND OBJECTIVE BOX
SUBJECTIVE:    Patient is a 38y old Female who presents with a chief complaint of PNA / R/o Tb (04 Aug 2018 02:13)    Currently admitted to medicine with the primary diagnosis of Pneumonia.     Today is hospital day 4d. This morning she is resting comfortably in bed and reports no new issues or overnight events. No cp, fever, dysuria, diarrhea.    PAST MEDICAL & SURGICAL HISTORY  Neurofibromatosis  Migraine  Pulmonary embolism  No significant past surgical history    SOCIAL HISTORY:  Negative for smoking/alcohol/drug use.     ALLERGIES:  No Known Allergies    MEDICATIONS:  STANDING MEDICATIONS  ampicillin/sulbactam  IVPB 3 Gram(s) IV Intermittent every 6 hours  enoxaparin Injectable 40 milliGRAM(s) SubCutaneous every 24 hours    PRN MEDICATIONS  acetaminophen   Tablet 650 milliGRAM(s) Oral every 6 hours PRN    VITALS:   T(F): 98.6  HR: 72  BP: 111/54  RR: 17  SpO2: 100%    LABS:                        11.1   10.92 )-----------( 428      ( 07 Aug 2018 06:50 )             35.0     08-06    135  |  98  |  6<L>  ----------------------------<  87  4.3   |  23  |  0.6<L>    Ca    9.2      06 Aug 2018 06:41    TPro  7.3  /  Alb  3.8  /  TBili  <0.2  /  DBili  x   /  AST  19  /  ALT  14  /  AlkPhos  73  08-06        Culture - Blood (collected 04 Aug 2018 12:57)  Source: .Blood None  Preliminary Report (05 Aug 2018 18:01):    No growth to date.    Quantiferon - Gold Tuberculosis (08.04.18 @ 07:15)    Quantiferon - Gold Tuberculosis Result: Negative    C4 Complement, Serum (08.04.18 @ 16:52)    C4 Complement, Serum: 40:     C3 Complement, Serum (08.04.18 @ 16:52)    C3 Complement, Serum: 192:    Sedimentation Rate, Erythrocyte: 58 mm/hr (08.04.18 @ 16:52)    C-Reactive Protein, Serum: 4.25 mg/dL (08.04.18 @ 16:52)      RADIOLOGY:     EXAM:  CT CHEST IC        PROCEDURE DATE:  08/03/2018    1.  When compared to prior 2017 studies, there is increased cavitation   within the left lower lobe with increased bronchiectasis and adjacent   patchy confluent opacities as well as peribronchiolar nodules. Correlate   for superimposed infection.      PHYSICAL EXAM:  GEN: No acute distress, afebrile  LUNGS: Decreased BS in LLL with dullness to percussion, CTA on R  HEART: S1/S2 present. RRR.   ABD: Soft, non-tender, non-distended. Bowel sounds present  EXT: NC/NC/NE/2+PP/SILVA/Skin Intact.   NEURO: AAOX3 SUBJECTIVE:    Patient is a 38y old Female who presents with a chief complaint of PNA / R/o Tb (04 Aug 2018 02:13)    Currently admitted to medicine with the primary diagnosis of Pneumonia.     Today is hospital day 4d. She is resting comfortably in bed and reports no new issues or events post-bronchoscopy. No cp, fever, dysuria, abd pain or diarrhea.    PAST MEDICAL & SURGICAL HISTORY  Neurofibromatosis  Migraine  Pulmonary embolism  No significant past surgical history    SOCIAL HISTORY:  Negative for smoking/alcohol/drug use.     ALLERGIES:  No Known Allergies    MEDICATIONS:  STANDING MEDICATIONS  ampicillin/sulbactam  IVPB 3 Gram(s) IV Intermittent every 6 hours  enoxaparin Injectable 40 milliGRAM(s) SubCutaneous every 24 hours    PRN MEDICATIONS  acetaminophen   Tablet 650 milliGRAM(s) Oral every 6 hours PRN    VITALS:   T(F): 98.6  HR: 72  BP: 111/54  RR: 17  SpO2: 100%    LABS:                        11.1   10.92 )-----------( 428      ( 07 Aug 2018 06:50 )             35.0     08-06    135  |  98  |  6<L>  ----------------------------<  87  4.3   |  23  |  0.6<L>    Ca    9.2      06 Aug 2018 06:41    TPro  7.3  /  Alb  3.8  /  TBili  <0.2  /  DBili  x   /  AST  19  /  ALT  14  /  AlkPhos  73  08-06        Culture - Blood (collected 04 Aug 2018 12:57)  Source: .Blood None  Preliminary Report (05 Aug 2018 18:01):    No growth to date.    Quantiferon - Gold Tuberculosis (08.04.18 @ 07:15)    Quantiferon - Gold Tuberculosis Result: Negative    C4 Complement, Serum (08.04.18 @ 16:52)    C4 Complement, Serum: 40:     C3 Complement, Serum (08.04.18 @ 16:52)    C3 Complement, Serum: 192:    Sedimentation Rate, Erythrocyte: 58 mm/hr (08.04.18 @ 16:52)    C-Reactive Protein, Serum: 4.25 mg/dL (08.04.18 @ 16:52)      RADIOLOGY:     EXAM:  CT CHEST IC        PROCEDURE DATE:  08/03/2018    1.  When compared to prior 2017 studies, there is increased cavitation   within the left lower lobe with increased bronchiectasis and adjacent   patchy confluent opacities as well as peribronchiolar nodules. Correlate   for superimposed infection.      PHYSICAL EXAM:  GEN: No acute distress, afebrile  LUNGS: Decreased BS in LLL with dullness to percussion, CTA on R  HEART: S1/S2 present. RRR.   ABD: Soft, non-tender, non-distended. Bowel sounds present  EXT: NC/NC/NE/2+PP/SILVA/Skin Intact.   NEURO: AAOX3

## 2018-08-08 NOTE — PROGRESS NOTE ADULT - ASSESSMENT
Assessment:  Patient is a 38 F w/ a pmh of neurofibromatosis, migraines, & a PE in 2016 who is sent to the ED by her PMD for persistent pneumonia sxs (non-prod cough, on and off fevers, sob, fatigue) despite antibiotic treatment. O/p cxr revealed cavitary lesions & she was sent in by pmd to r/o tb as well.     # Pneumonia/ caviatry lesion  - WBC 10.9 today (from 12 on admission), afebrile and vitals stable  - elevated ESR, CRP, C3 and C4 noted on labs  - CT chest shows increased airspace opacities in the LLL  - sputum induction & culture for afb pending, autoimmune workup pending, MRSA nasal and blood culture pending  - continue Unasyn as per ID  -  S/P bronchoscopy today.   -  Blood culture neg x2, quantiferon neg    #neurofibromatosis  stable  outpt f/u    #PE  s/p AC    # DVT ppx: Lovenox  # Regular diet  # Full code

## 2018-08-08 NOTE — PROGRESS NOTE ADULT - ATTENDING COMMENTS
38F PMHx neurofibromatosis, migraines, PE 2016 here with persistent pneumonia/ LLL cavitary lesion.    opacity noted in 2016 and cavitation in 2017; attributed to infarct at that time per p  C/W Unasyn and follow up chest xray tomorrow to r/o pneumonia   Discharge planning DEPENDS ON THE BIOPSY AND BAL.   Fungal infection vs malignancy
Patient seen and examined independently. Case discussed with nursing, patient. I agree with the resident's note, physical exam, and plan except as below. Today reports increased phlegm production. Asked RN to collect the specimen. Awaiting pulm c/s. ESR, CRP noted. Encouraged ambulation.
Patient seen and examined independently. Case discussed with nursing, patient. I agree with the resident's note, physical exam, and plan except as below. Today reports increased phlegm production. Asked RN to collect the specimen. Awaiting pulm c/s. ESR, CRP noted. Encouraged ambulation.
38F PMHx neurofibromatosis, migraines, PE 2016 here with persistent pneumonia/ LLL cavitary lesion.    opacity noted in 2016 and cavitation in 2017; attributed to infarct at that time per pt  plan for bronch tm  NPO at midnight  unasyn per ID  appreciate recs

## 2018-08-08 NOTE — MEDICAL STUDENT PROGRESS NOTE(EDUCATION) - NS MD HP STUD ASPLAN PLAN FT
# Pneumonia/ LLL cavitary lesion  - afebrile and vitals stable  - elevated ESR, CRP, C3 and C4 noted on labs  - CT chest shows increased airspace opacities in the LLL  - sputum induction & culture for afb pending, autoimmune workup pending, MRSA nasal and blood culture pending  - Blood culture neg x2, quantiferon neg  - continue Unasyn as per ID  - bronchoscopy with BAL and biopsy done this morning, will await results and recommendations from pulm  - pt will receive a CXR tomorrow to r/o pneumothorax with anticipated discharge after    # Neurofibromatosis  stable  outpt f/u    # PE  s/p AC    # DVT ppx: Lovenox  # Regular diet  # Full code Assessment:  Patient is a 38 F w/ a pmh of neurofibromatosis, migraines, & a PE in 2016 who is here for persistent pneumonia despite antibiotic treatment. O/p cxr revealed LLL cavitary lesion & she was sent in by pmd to r/o TB as well.    # Pneumonia/ LLL cavitary lesion  - afebrile and vitals stable  - elevated ESR, CRP, C3 and C4 noted on labs  - CT chest shows increased airspace opacities in the LLL  - sputum induction & culture for afb pending, autoimmune workup pending, MRSA nasal and blood culture pending  - Blood culture neg x2, quantiferon neg  - continue Unasyn as per ID  - bronchoscopy with BAL and biopsy done this morning, will await results and recommendations from pulm  - f/u CXR tomorrow - r/o pneumothorax with anticipated discharge after    # Neurofibromatosis  stable  outpt f/u    # PE  s/p AC    # DVT ppx: Lovenox  # Regular diet  # Full code    Dispo : possible discharge tomorrow.

## 2018-08-08 NOTE — PROGRESS NOTE ADULT - SUBJECTIVE AND OBJECTIVE BOX
SUBJECTIVE:    Patient is a 38y old Female who presents with a chief complaint of PNA / R/o Tb (04 Aug 2018 02:13)    Currently admitted to medicine with the primary diagnosis of Pneumonia with cavitary lesions      Today is hospital day 3d. This morning she is resting comfortably in bed and reports no new issues or overnight events. no cp, fever, dysuria, diarrhea.    PAST MEDICAL & SURGICAL HISTORY  Migraine  Pulmonary embolism  No significant past surgical history    SOCIAL HISTORY:  Negative for smoking/alcohol/drug use.     ALLERGIES:  No Known Allergies    MEDICATIONS:  STANDING MEDICATIONS  ampicillin/sulbactam  IVPB 3 Gram(s) IV Intermittent every 6 hours  enoxaparin Injectable 40 milliGRAM(s) SubCutaneous every 24 hours    PRN MEDICATIONS  acetaminophen   Tablet 650 milliGRAM(s) Oral every 6 hours PRN    VITALS:   Vital Signs Last 24 Hrs  T(C): 37.1 (08 Aug 2018 07:51), Max: 37.1 (07 Aug 2018 21:34)  T(F): 98.7 (08 Aug 2018 07:38), Max: 98.7 (07 Aug 2018 21:34)  HR: 83 (08 Aug 2018 09:25) (72 - 96)  BP: 118/81 (08 Aug 2018 09:25) (109/57 - 130/84)  RR: 18 (08 Aug 2018 09:25) (18 - 20)  SpO2: 98% (08 Aug 2018 09:01) (97% - 100%)    LABS:                        11.1   10.92 )-----------( 428      ( 07 Aug 2018 06:50 )             35.0     08-06    135  |  98  |  6<L>  ----------------------------<  87  4.3   |  23  |  0.6<L>    Ca    9.2      06 Aug 2018 06:41    TPro  7.3  /  Alb  3.8  /  TBili  <0.2  /  DBili  x   /  AST  19  /  ALT  14  /  AlkPhos  73  08-06        Culture - Blood (collected 04 Aug 2018 12:57)  Source: .Blood None  Preliminary Report (05 Aug 2018 18:01):    No growth to date.      C4 Complement, Serum (08.04.18 @ 16:52)    C4 Complement, Serum: 40:     C3 Complement, Serum (08.04.18 @ 16:52)    C3 Complement, Serum: 192:    Sedimentation Rate, Erythrocyte: 58 mm/hr (08.04.18 @ 16:52)    C-Reactive Protein, Serum: 4.25 mg/dL (08.04.18 @ 16:52)                RADIOLOGY:     EXAM:  CT CHEST IC        PROCEDURE DATE:  08/03/2018    1.  When compared to prior 2017 studies, there is increased cavitation   within the left lower lobe with increased bronchiectasis and adjacent   patchy confluent opacities as well as peribronchiolar nodules. Correlate   for superimposed infection.      PHYSICAL EXAM:  GEN: No acute distress, afebrile  LUNGS: Decreased BS in LLL with dullness to percussion, CTA on R  HEART: S1/S2 present. RRR.   ABD: Soft, non-tender, non-distended. Bowel sounds present  EXT: NC/NC/NE/2+PP/SILVA/Skin Intact.   NEURO: AAOX3

## 2018-08-09 ENCOUNTER — TRANSCRIPTION ENCOUNTER (OUTPATIENT)
Age: 38
End: 2018-08-09

## 2018-08-09 VITALS — OXYGEN SATURATION: 100 %

## 2018-08-09 LAB
CULTURE RESULTS: SIGNIFICANT CHANGE UP
CULTURE RESULTS: SIGNIFICANT CHANGE UP
NIGHT BLUE STAIN TISS: SIGNIFICANT CHANGE UP
SPECIMEN SOURCE: SIGNIFICANT CHANGE UP

## 2018-08-09 PROCEDURE — 93970 EXTREMITY STUDY: CPT | Mod: 26

## 2018-08-09 RX ADMIN — AMPICILLIN SODIUM AND SULBACTAM SODIUM 200 GRAM(S): 250; 125 INJECTION, POWDER, FOR SUSPENSION INTRAMUSCULAR; INTRAVENOUS at 00:58

## 2018-08-09 RX ADMIN — Medication 1 TABLET(S): at 17:32

## 2018-08-09 NOTE — PROGRESS NOTE ADULT - PROVIDER SPECIALTY LIST ADULT
Infectious Disease
Internal Medicine
Pulmonology
Pulmonology
Internal Medicine

## 2018-08-09 NOTE — DISCHARGE NOTE ADULT - CARE PROVIDERS DIRECT ADDRESSES
[email or web address],DirectAddress_Unknown

## 2018-08-09 NOTE — PROGRESS NOTE ADULT - SUBJECTIVE AND OBJECTIVE BOX
JENNIFER, LUBA  38y, Female      OVERNIGHT EVENTS:  bronch cx with rare GPC pairs    ROS negative except as per above    VITALS:  T(F): 97.3, Max: 99.4 (08-08-18 @ 13:51)  HR: 77  BP: 104/61  RR: 18Vital Signs Last 24 Hrs  T(C): 36.3 (09 Aug 2018 05:10), Max: 37.4 (08 Aug 2018 13:51)  T(F): 97.3 (09 Aug 2018 05:10), Max: 99.4 (08 Aug 2018 13:51)  HR: 77 (09 Aug 2018 05:10) (77 - 96)  BP: 104/61 (09 Aug 2018 05:10) (104/61 - 130/84)  BP(mean): --  RR: 18 (09 Aug 2018 05:10) (18 - 18)  SpO2: 98% (08 Aug 2018 09:01) (98% - 100%)    PHYSICAL EXAM:  Gen: Awake and alert, non-toxic appearing, NAD  HEENT: NCAT. EOMI. MMM.   Neck: Supple, no cervical LAD  CV: RRR, no murmurs  Lungs: CTAB, no w/r/r  Abd: Soft. NTND  Extr: wwp, no edema  Skin: no rash, +NF  Neuro: No focal deficits  Lines: clean    TESTS & MEASUREMENTS:                        11.6   9.57  )-----------( 430      ( 08 Aug 2018 13:25 )             37.4     08-08    137  |  99  |  5<L>  ----------------------------<  81  4.3   |  23  |  0.6<L>    Ca    9.6      08 Aug 2018 13:25          Culture - Bronchial (collected 08-08-18 @ 11:37)  Source: .Broncial None  Gram Stain (08-08-18 @ 22:04):    No squamous epithelial cells per low power field    Few polymorphonuclear leukocytes per low power field    Rare Gram positive cocci in pairs per oil power field    Culture - Blood (collected 08-04-18 @ 12:57)  Source: .Blood None  Preliminary Report (08-05-18 @ 18:01):    No growth to date.    Culture - Blood (collected 08-04-18 @ 07:15)  Source: .Blood Blood  Preliminary Report (08-05-18 @ 15:02):    No growth to date.          RADIOLOGY & ADDITIONAL TESTS:    ANTIBIOTICS:  ampicillin/sulbactam  IVPB   200 mL/Hr IV Intermittent (08-09-18 @ 00:58)   200 mL/Hr IV Intermittent (08-08-18 @ 18:56)   200 mL/Hr IV Intermittent (08-08-18 @ 11:46)   200 mL/Hr IV Intermittent (08-08-18 @ 05:11)   200 mL/Hr IV Intermittent (08-08-18 @ 01:51)   200 mL/Hr IV Intermittent (08-07-18 @ 13:32)   200 mL/Hr IV Intermittent (08-07-18 @ 05:24)   200 mL/Hr IV Intermittent (08-06-18 @ 20:22)   200 mL/Hr IV Intermittent (08-06-18 @ 12:30)   200 mL/Hr IV Intermittent (08-06-18 @ 06:45)   200 mL/Hr IV Intermittent (08-06-18 @ 02:26)   200 mL/Hr IV Intermittent (08-05-18 @ 20:57)   200 mL/Hr IV Intermittent (08-05-18 @ 12:12)   200 mL/Hr IV Intermittent (08-05-18 @ 06:32)   200 mL/Hr IV Intermittent (08-05-18 @ 01:10)   200 mL/Hr IV Intermittent (08-04-18 @ 17:56)    cefepime   IVPB   100 mL/Hr IV Intermittent (08-04-18 @ 01:37)    ciprofloxacin   IVPB   200 mL/Hr IV Intermittent (08-04-18 @ 01:37)    levoFLOXacin IVPB   100 mL/Hr IV Intermittent (08-04-18 @ 08:29)    piperacillin/tazobactam IVPB.   25 mL/Hr IV Intermittent (08-04-18 @ 16:05)   25 mL/Hr IV Intermittent (08-04-18 @ 10:14)    vancomycin  IVPB   250 mL/Hr IV Intermittent (08-04-18 @ 14:17)   250 mL/Hr IV Intermittent (08-04-18 @ 06:26)        ampicillin/sulbactam  IVPB 3 Gram(s) IV Intermittent every 6 hours JENNIFER, LUBA  38y, Female      OVERNIGHT EVENTS:  bronch cx with rare GPC pairs  continued dry cough    ROS negative except as per above    VITALS:  T(F): 97.3, Max: 99.4 (08-08-18 @ 13:51)  HR: 77  BP: 104/61  RR: 18Vital Signs Last 24 Hrs  T(C): 36.3 (09 Aug 2018 05:10), Max: 37.4 (08 Aug 2018 13:51)  T(F): 97.3 (09 Aug 2018 05:10), Max: 99.4 (08 Aug 2018 13:51)  HR: 77 (09 Aug 2018 05:10) (77 - 96)  BP: 104/61 (09 Aug 2018 05:10) (104/61 - 130/84)  BP(mean): --  RR: 18 (09 Aug 2018 05:10) (18 - 18)  SpO2: 98% (08 Aug 2018 09:01) (98% - 100%)    PHYSICAL EXAM:  Gen: Awake and alert, non-toxic appearing, NAD  HEENT: NCAT. EOMI. MMM.   Neck: Supple, no cervical LAD  CV: RRR, no murmurs  Lungs: CTAB, no w/r/r  Abd: Soft. NTND  Extr: wwp, no edema  Skin: no rash, +NF  Neuro: No focal deficits  Lines: clean    TESTS & MEASUREMENTS:                        11.6   9.57  )-----------( 430      ( 08 Aug 2018 13:25 )             37.4     08-08    137  |  99  |  5<L>  ----------------------------<  81  4.3   |  23  |  0.6<L>    Ca    9.6      08 Aug 2018 13:25          Culture - Bronchial (collected 08-08-18 @ 11:37)  Source: .Broncial None  Gram Stain (08-08-18 @ 22:04):    No squamous epithelial cells per low power field    Few polymorphonuclear leukocytes per low power field    Rare Gram positive cocci in pairs per oil power field    Culture - Blood (collected 08-04-18 @ 12:57)  Source: .Blood None  Preliminary Report (08-05-18 @ 18:01):    No growth to date.    Culture - Blood (collected 08-04-18 @ 07:15)  Source: .Blood Blood  Preliminary Report (08-05-18 @ 15:02):    No growth to date.          RADIOLOGY & ADDITIONAL TESTS:    ANTIBIOTICS:  ampicillin/sulbactam  IVPB   200 mL/Hr IV Intermittent (08-09-18 @ 00:58)   200 mL/Hr IV Intermittent (08-08-18 @ 18:56)   200 mL/Hr IV Intermittent (08-08-18 @ 11:46)   200 mL/Hr IV Intermittent (08-08-18 @ 05:11)   200 mL/Hr IV Intermittent (08-08-18 @ 01:51)   200 mL/Hr IV Intermittent (08-07-18 @ 13:32)   200 mL/Hr IV Intermittent (08-07-18 @ 05:24)   200 mL/Hr IV Intermittent (08-06-18 @ 20:22)   200 mL/Hr IV Intermittent (08-06-18 @ 12:30)   200 mL/Hr IV Intermittent (08-06-18 @ 06:45)   200 mL/Hr IV Intermittent (08-06-18 @ 02:26)   200 mL/Hr IV Intermittent (08-05-18 @ 20:57)   200 mL/Hr IV Intermittent (08-05-18 @ 12:12)   200 mL/Hr IV Intermittent (08-05-18 @ 06:32)   200 mL/Hr IV Intermittent (08-05-18 @ 01:10)   200 mL/Hr IV Intermittent (08-04-18 @ 17:56)    cefepime   IVPB   100 mL/Hr IV Intermittent (08-04-18 @ 01:37)    ciprofloxacin   IVPB   200 mL/Hr IV Intermittent (08-04-18 @ 01:37)    levoFLOXacin IVPB   100 mL/Hr IV Intermittent (08-04-18 @ 08:29)    piperacillin/tazobactam IVPB.   25 mL/Hr IV Intermittent (08-04-18 @ 16:05)   25 mL/Hr IV Intermittent (08-04-18 @ 10:14)    vancomycin  IVPB   250 mL/Hr IV Intermittent (08-04-18 @ 14:17)   250 mL/Hr IV Intermittent (08-04-18 @ 06:26)        ampicillin/sulbactam  IVPB 3 Gram(s) IV Intermittent every 6 hours

## 2018-08-09 NOTE — DISCHARGE NOTE ADULT - CARE PLAN
Principal Discharge DX:	Pneumonia of left lower lobe due to infectious organism  Goal:	Determination of etiology of pneumonia  Assessment and plan of treatment:	You were admitted to the hospital for pneumonia that was not resolved by outpatient antibiotic treatment and to make sure you did not have Tuberculosis. A bronchoscopy and biopsy was done to try to find the cause of your pneumonia. You also got a doppler imaging test to look for clots in your legs due to your history of pulmonary embolism. You will follow up with an outpatient pulmonologist for the results of the biopsy. Principal Discharge DX:	Pneumonia of left lower lobe due to infectious organism  Goal:	Determination of etiology of pneumonia  Assessment and plan of treatment:	You were admitted to the hospital for pneumonia that was not resolved by outpatient antibiotic treatment and to make sure you did not have Tuberculosis. A bronchoscopy and biopsy was done to try to find the cause of your pneumonia. You also got a doppler imaging test to look for clots in your legs due to your history of pulmonary embolism. Please follow up with an outpatient pulmonologist for the results of the biopsy. Please take your medications as prescribed. Principal Discharge DX:	Pneumonia of left lower lobe due to infectious organism  Goal:	Determination of etiology of pneumonia  Assessment and plan of treatment:	You were admitted to the hospital for pneumonia that was not resolved by outpatient antibiotic treatment and to make sure you did not have Tuberculosis. A bronchoscopy and biopsy was done to try to find the cause of your pneumonia. You also got a doppler imaging test to look for clots in your legs due to your history of pulmonary embolism and it was negative. Please follow up with infectious disease, pulmonology and your primary doctor for the results of the biopsy and further management. Please take your medications as prescribed.

## 2018-08-09 NOTE — PROGRESS NOTE ADULT - ASSESSMENT
38F PMHx neurofibromatosis, migraines, PE 2016 here with pneumonia/ caviatary lesion L lower lobe s/p bronch with biopsy.    1. Pneumonia/ cavitary lesion  bronch with gpc in pairs  cont augmentin per ID  plan for discharge today  needs ID and pulm f/u  2. PE  dopplers neg  off AC  outpt f/u  3. Neurofibromatosis  stable  4. DVT ppx  lovenox

## 2018-08-09 NOTE — PROGRESS NOTE ADULT - SUBJECTIVE AND OBJECTIVE BOX
CHIEF COMPLIANT: SOB/ COUGH    OVERNIGHT EVENTS: S/P BRONCHOSCOPY, OCC COUGH    Vital Signs Last 24 Hrs  T(C): 35.8 (09 Aug 2018 12:50), Max: 37.2 (08 Aug 2018 20:15)  T(F): 96.5 (09 Aug 2018 12:50), Max: 98.9 (08 Aug 2018 20:15)  HR: 69 (09 Aug 2018 12:50) (69 - 89)  BP: 109/58 (09 Aug 2018 12:50) (104/61 - 111/65)  BP(mean): --  RR: 18 (09 Aug 2018 05:10) (18 - 18)  SpO2: 100% (09 Aug 2018 15:06) (100% - 100%)    PHYSICAL EXAMINATION:    GENERAL: The patient is awake and alert in no apparent distress.     HEENT: Head is normocephalic and atraumatic. Extraocular muscles are intact. Mucous membranes are moist.    NECK: Supple.    LUNGS: DEC BS R SIDE    HEART: Regular rate and rhythm without murmur.    ABDOMEN: Soft, nontender, and nondistended.      EXTREMITIES: Without any cyanosis, clubbing, rash, lesions or edema.    NEUROLOGIC: Grossly intact.    SKIN: No ulceration or induration present.      LABS:                        11.6   9.57  )-----------( 430      ( 08 Aug 2018 13:25 )             37.4     08-08    137  |  99  |  5<L>  ----------------------------<  81  4.3   |  23  |  0.6<L>    Ca    9.6      08 Aug 2018 13:25                            08-08-18 @ 07:01  -  08-09-18 @ 07:00  --------------------------------------------------------  IN: 100 mL / OUT: 0 mL / NET: 100 mL        MICROBIOLOGY:  Culture Results:   Testing in progress (08-08 @ 11:50)      MEDICATIONS  (STANDING):  amoxicillin  875 milliGRAM(s)/clavulanate 1 Tablet(s) Oral two times a day  enoxaparin Injectable 40 milliGRAM(s) SubCutaneous every 24 hours    MEDICATIONS  (PRN):  acetaminophen   Tablet 650 milliGRAM(s) Oral every 6 hours PRN For Temp greater than 38 C (100.4 F)      RADIOLOGY & ADDITIONAL STUDIES:

## 2018-08-09 NOTE — PROGRESS NOTE ADULT - SUBJECTIVE AND OBJECTIVE BOX
LUBA RODRIGUEZ  38y  Female      Patient is a 38y old  Female who presents with a chief complaint of PNA / R/o TB (09 Aug 2018 10:14)      INTERVAL HPI/OVERNIGHT EVENTS:  no sob, cp, abd pain, fever.    Vital Signs Last 24 Hrs  T(C): 35.8 (09 Aug 2018 12:50), Max: 37.2 (08 Aug 2018 20:15)  T(F): 96.5 (09 Aug 2018 12:50), Max: 98.9 (08 Aug 2018 20:15)  HR: 69 (09 Aug 2018 12:50) (69 - 89)  BP: 109/58 (09 Aug 2018 12:50) (104/61 - 111/65)  BP(mean): --  RR: 18 (09 Aug 2018 05:10) (18 - 18)  SpO2: 100% (09 Aug 2018 15:06) (100% - 100%)    PHYSICAL EXAM:  GENERAL: NAD, well-groomed, well-developed  HEAD:  Atraumatic, Normocephalic    CHEST/LUNG: Clear to auscultation bilaterally; No rales, rhonchi, wheezing, or rubs  HEART: Regular rate and rhythm; No murmurs, rubs, or gallops  ABDOMEN: Soft, Nontender, Nondistended; Bowel sounds present  EXTREMITIES:  2+ Peripheral Pulses, No clubbing, cyanosis, or edema      Consultant(s) Notes Reviewed:  [x ] YES  [ ] NO  Care Discussed with Consultants/Other Providers [ x] YES  [ ] NO    LABS:                        11.6   9.57  )-----------( 430      ( 08 Aug 2018 13:25 )             37.4     08-08    137  |  99  |  5<L>  ----------------------------<  81  4.3   |  23  |  0.6<L>    Ca    9.6      08 Aug 2018 13:25            CAPILLARY BLOOD GLUCOSE          RADIOLOGY & ADDITIONAL TESTS:    Imaging Personally Reviewed:  [ ] YES  [ ] NO

## 2018-08-09 NOTE — PROGRESS NOTE ADULT - ASSESSMENT
Patient is a 38 F w/ a PMHx of neurofibromatosis, migraines, & a PE in 2016 who is sent in to the ED by her PMD for persistent pneumonia, QUANTERFERON NEG    Assessment:     LLL Cavitary Lesion/ PNA/ worsening    Plan:    AUGMENTIN ON SC VS CLINDA/ VANTIN  OP F/UP

## 2018-08-09 NOTE — DISCHARGE NOTE ADULT - HOSPITAL COURSE
Patient is a 39 yo F with PMH of neurofibromatosis, migraines, and PE in 2016 who was admitted to the hospital for pneumonia refractory to outpatient antibiotic treatment and evaluation of a LLL cavitary lesion. TB was ruled out by ID and pt had negative quantiferon and negative blood culture x2. She was treated with Unasyn and an autoimmune workup was started. Pt had elevated ESR, CRP, C3, and C4 and is RAMOS positive. DsDNA was negative, AFB and MRSA are pending. 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 Unasyn was switched to Augmentin 875 mg PO. Pt received LE doppler due to her history of PE. Pt will follow up with outpatient pulmonologist for results of culture and biopsy. Patient is a 37 yo F with PMH of neurofibromatosis, migraines, and PE in 2016 who was admitted to the hospital for pneumonia refractory to outpatient antibiotic treatment and evaluation of a LLL cavitary lesion. TB was ruled out by ID and pt had negative quantiferon and negative blood culture x2. She was treated with Unasyn and an autoimmune workup was started. Pt had elevated ESR, CRP, C3, and C4 and is RAMOS positive. DsDNA was negative, AFB and MRSA are pending. 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 Unasyn was switched to Augmentin 875 mg PO. Pt received LE doppler due to her history of PE and her cessation of taking Eliquis. Pt will follow up with outpatient pulmonologist for results of culture and biopsy. Patient is a 37 yo F with PMH of neurofibromatosis, migraines, and PE in 2016 who was admitted to the hospital for pneumonia refractory to outpatient antibiotic treatment and evaluation of a LLL cavitary lesion. TB was ruled out by ID and pt had negative quantiferon and negative blood culture x2. She was treated with Unasyn and an autoimmune workup was started. Pt had elevated ESR, CRP, C3, and C4 and is RAMOS positive. DsDNA was negative, AFB and MRSA are pending. 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 Unasyn was switched to Augmentin 875 mg PO. Pt received LE doppler due to her history of PE and her cessation of taking Eliquis. Pt will follow up with outpatient pulmonologist for results of culture and biopsy.    attending: d/c on augmentin, needs follow up with pulm, ID. 42 minutes spent on discharge planning. Patient is a 37 yo F with PMH of neurofibromatosis, migraines, and PE in 2016 who was admitted to the hospital for pneumonia refractory to outpatient antibiotic treatment and evaluation of a LLL cavitary lesion. TB was ruled out by ID and pt had negative quantiferon and negative blood culture x2. She was treated with Unasyn and an autoimmune workup was started. Pt had elevated ESR, CRP, C3, and C4 and is RAMOS positive. DsDNA was negative, AFB and MRSA are pending. 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 Unasyn was switched to Augmentin 875 mg PO. Pt received LE doppler due to her history of PE and her cessation of taking Eliquis which was negative. Pt will follow up with outpatient pulmonologist for results of culture and biopsy.    attending: d/c on augmentin, needs follow up with pulm, ID. 42 minutes spent on discharge planning.

## 2018-08-09 NOTE — DISCHARGE NOTE ADULT - PLAN OF CARE
Determination of etiology of pneumonia You were admitted to the hospital for pneumonia that was not resolved by outpatient antibiotic treatment and to make sure you did not have Tuberculosis. A bronchoscopy and biopsy was done to try to find the cause of your pneumonia. You also got a doppler imaging test to look for clots in your legs due to your history of pulmonary embolism. You will follow up with an outpatient pulmonologist for the results of the biopsy. You were admitted to the hospital for pneumonia that was not resolved by outpatient antibiotic treatment and to make sure you did not have Tuberculosis. A bronchoscopy and biopsy was done to try to find the cause of your pneumonia. You also got a doppler imaging test to look for clots in your legs due to your history of pulmonary embolism. Please follow up with an outpatient pulmonologist for the results of the biopsy. Please take your medications as prescribed. You were admitted to the hospital for pneumonia that was not resolved by outpatient antibiotic treatment and to make sure you did not have Tuberculosis. A bronchoscopy and biopsy was done to try to find the cause of your pneumonia. You also got a doppler imaging test to look for clots in your legs due to your history of pulmonary embolism and it was negative. Please follow up with infectious disease, pulmonology and your primary doctor for the results of the biopsy and further management. Please take your medications as prescribed.

## 2018-08-09 NOTE — PROGRESS NOTE ADULT - ASSESSMENT
38 F w/ a pmh of neurofibromatosis, migraines, & an unprovoked PE in 2016 sent in to the ED by her PMD for persistent pneumonia. 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

## 2018-08-09 NOTE — DISCHARGE NOTE ADULT - CARE PROVIDER_API CALL
Keegan Montano), Family Medicine  [address]  [address]  [place] [address]  Phone: [phone]  Fax: [phone]    Idalia Hernandez), Internal Medicine  [address] [place] [address]  Phone: [phone]  Fax: [phone]    Bam Hand), Critical Care Medicine; Internal Medicine; Pulmonary Disease; Sleep Medicine  [address] [place] [address]  Phone: [phone]  Fax: [phone]

## 2018-08-09 NOTE — DISCHARGE NOTE ADULT - PATIENT PORTAL LINK FT
You can access the [facility] Patient Portal, offered by [facility], by registering with the following website: [email or web address]

## 2018-08-09 NOTE — DISCHARGE NOTE ADULT - MEDICATION SUMMARY - MEDICATIONS TO TAKE
I will START or STAY ON the medications listed below when I get home from the hospital:    amoxicillin-clavulanate 875 mg-125 mg oral tablet  -- 1 tab(s) by mouth 2 times a days for 14 days  -- Indication: For PNEUMONIA

## 2018-08-10 LAB
CULTURE RESULTS: SIGNIFICANT CHANGE UP
DRVVT SCREEN TO CONFIRM RATIO: SIGNIFICANT CHANGE UP
LA NT DPL PPP QL: SIGNIFICANT CHANGE UP
NON-GYNECOLOGICAL CYTOLOGY STUDY: SIGNIFICANT CHANGE UP
NORMALIZED SCT PPP-RTO: 0.81 RATIO — SIGNIFICANT CHANGE UP (ref 0–1.16)
NORMALIZED SCT PPP-RTO: SIGNIFICANT CHANGE UP
SPECIMEN SOURCE: SIGNIFICANT CHANGE UP

## 2018-08-13 PROBLEM — I26.99 OTHER PULMONARY EMBOLISM WITHOUT ACUTE COR PULMONALE: Chronic | Status: ACTIVE | Noted: 2018-08-03

## 2018-08-13 PROBLEM — G43.909 MIGRAINE, UNSPECIFIED, NOT INTRACTABLE, WITHOUT STATUS MIGRAINOSUS: Chronic | Status: ACTIVE | Noted: 2018-08-03

## 2018-08-20 DIAGNOSIS — Q85.00 NEUROFIBROMATOSIS, UNSPECIFIED: ICD-10-CM

## 2018-08-20 DIAGNOSIS — J18.9 PNEUMONIA, UNSPECIFIED ORGANISM: ICD-10-CM

## 2018-08-20 DIAGNOSIS — Z86.711 PERSONAL HISTORY OF PULMONARY EMBOLISM: ICD-10-CM

## 2018-08-29 ENCOUNTER — TRANSCRIPTION ENCOUNTER (OUTPATIENT)
Age: 38
End: 2018-08-29

## 2018-09-08 LAB
CULTURE RESULTS: SIGNIFICANT CHANGE UP
SPECIMEN SOURCE: SIGNIFICANT CHANGE UP

## 2018-09-25 LAB
CULTURE RESULTS: SIGNIFICANT CHANGE UP
SPECIMEN SOURCE: SIGNIFICANT CHANGE UP

## 2018-10-02 ENCOUNTER — OUTPATIENT (OUTPATIENT)
Dept: OUTPATIENT SERVICES | Facility: HOSPITAL | Age: 38
LOS: 1 days | Discharge: HOME | End: 2018-10-02

## 2018-10-02 ENCOUNTER — APPOINTMENT (OUTPATIENT)
Dept: PULMONOLOGY | Facility: CLINIC | Age: 38
End: 2018-10-02

## 2018-10-02 VITALS
HEIGHT: 64 IN | OXYGEN SATURATION: 99 % | HEART RATE: 91 BPM | DIASTOLIC BLOOD PRESSURE: 78 MMHG | SYSTOLIC BLOOD PRESSURE: 110 MMHG

## 2018-10-02 RX ORDER — CHLORHEXIDINE GLUCONATE 4 %
325 (65 FE) LIQUID (ML) TOPICAL DAILY
Qty: 90 | Refills: 1 | Status: DISCONTINUED | COMMUNITY
Start: 2017-03-13 | End: 2018-10-02

## 2018-10-02 RX ORDER — APIXABAN 5 MG/1
5 TABLET, FILM COATED ORAL
Refills: 0 | Status: DISCONTINUED | COMMUNITY
End: 2018-10-02

## 2018-10-02 RX ORDER — ACETAMINOPHEN 500 MG/1
500 TABLET, COATED ORAL
Refills: 0 | Status: DISCONTINUED | COMMUNITY
End: 2018-10-02

## 2018-10-03 RX ORDER — AMOXICILLIN AND CLAVULANATE POTASSIUM 875; 125 MG/1; MG/1
875-125 TABLET, COATED ORAL
Qty: 24 | Refills: 0 | Status: ACTIVE | COMMUNITY
Start: 2018-10-02 | End: 1900-01-01

## 2018-10-09 LAB
FUNGITELL QUANTITATIVE VALUE: <31 PG/ML
GALACTOMANNAN AG SERPL QL IA: <0.5 INDEX

## 2018-10-16 ENCOUNTER — APPOINTMENT (OUTPATIENT)
Dept: PULMONOLOGY | Facility: CLINIC | Age: 38
End: 2018-10-16

## 2019-01-24 ENCOUNTER — EMERGENCY (EMERGENCY)
Facility: HOSPITAL | Age: 39
LOS: 0 days | Discharge: HOME | End: 2019-01-24
Admitting: PHYSICIAN ASSISTANT

## 2019-01-24 VITALS — WEIGHT: 184.97 LBS

## 2019-01-24 VITALS
SYSTOLIC BLOOD PRESSURE: 128 MMHG | DIASTOLIC BLOOD PRESSURE: 69 MMHG | OXYGEN SATURATION: 98 % | RESPIRATION RATE: 18 BRPM | HEART RATE: 86 BPM | TEMPERATURE: 98 F

## 2019-01-24 DIAGNOSIS — K08.89 OTHER SPECIFIED DISORDERS OF TEETH AND SUPPORTING STRUCTURES: ICD-10-CM

## 2019-01-24 DIAGNOSIS — Z79.2 LONG TERM (CURRENT) USE OF ANTIBIOTICS: ICD-10-CM

## 2019-01-24 DIAGNOSIS — Z79.899 OTHER LONG TERM (CURRENT) DRUG THERAPY: ICD-10-CM

## 2019-01-24 NOTE — ED PROVIDER NOTE - PHYSICAL EXAMINATION
Physical Exam    Vital Signs: I have reviewed the initial vital signs.  Constitutional: well-nourished, appears stated age, no acute distress  Mouth: + tender to tooth #2 with swelling to right upper gums  Neuro: AOx3, Motor 5/5, Sensation: equal and intact

## 2019-01-24 NOTE — PROGRESS NOTE ADULT - SUBJECTIVE AND OBJECTIVE BOX
Patient is a 38y old  Female who presents with a chief complaint of pain in the upper right.    PAST MEDICAL & SURGICAL HISTORY:  Migraine  Pulmonary embolism  No significant past surgical history    Allergic/Immunologic:	    Allergies    No Known Allergies    Intolerances        FAMILY HISTORY:  No pertinent family history in first degree relatives      Vital Signs Last 24 Hrs  T(C): 36.7 (24 Jan 2019 14:06), Max: 36.7 (24 Jan 2019 14:06)  T(F): 98 (24 Jan 2019 14:06), Max: 98 (24 Jan 2019 14:06)  HR: 86 (24 Jan 2019 14:06) (86 - 86)  BP: 128/69 (24 Jan 2019 14:06) (128/69 - 128/69)  BP(mean): --  RR: 18 (24 Jan 2019 14:06) (18 - 18)  SpO2: 98% (24 Jan 2019 14:06) (98% - 98%)    IOE:  <<permanent>> dentition:          <<multiple missing teeth>>                   hard/soft palate:  <<No pathology noted>>            tongue/FOM <<No pathology noted>>            labial/buccal mucosa <<No pathology noted>>           ( Y  ) percussion           ( Y  ) palpation           ( N  ) swelling            ( N  ) abscess           ( N  ) sinus tract    *DENTAL RADIOGRAPHS: Periapical x-ray #2,3,4    *ASSESSMENT: Patient is a 38y old  Female who presents with a chief complaint of pain in the upper right. Patient is afebrile and does not present with any swelling or trismus. Patient has decay on #2 and #3 and will return to her primary dentist so that he can restore the teeth. Patient was prescribed Amoxicillin 500 and Ibuprofen 600.      *PLAN: F/U with primary dentist ASAP for evaluation/restoration of caries in #2 and #3.    RECOMMENDATIONS:  1) Dental F/U with outpatient dentist for comprehensive dental care.   2) If any difficulty swallowing/breathing, fever occur, return to ER.     Olman Donovan DDS

## 2019-01-24 NOTE — ED ADULT TRIAGE NOTE - CHIEF COMPLAINT QUOTE
upper right dental pain x 6 days. pt denies fever or chills. pt took Motrin at home at 12pm with slight improvement in pain.

## 2019-01-24 NOTE — ED ADULT NURSE NOTE - OBJECTIVE STATEMENT
Pt presents to ED c/o right upper tooth pain since Saturday. No s/s distress observed breathing easy and unlabored.

## 2019-04-13 ENCOUNTER — OUTPATIENT (OUTPATIENT)
Dept: OUTPATIENT SERVICES | Facility: HOSPITAL | Age: 39
LOS: 1 days | Discharge: HOME | End: 2019-04-13

## 2019-04-13 DIAGNOSIS — Z00.00 ENCOUNTER FOR GENERAL ADULT MEDICAL EXAMINATION WITHOUT ABNORMAL FINDINGS: ICD-10-CM

## 2020-03-16 ENCOUNTER — APPOINTMENT (OUTPATIENT)
Dept: OBGYN | Facility: CLINIC | Age: 40
End: 2020-03-16

## 2020-07-28 ENCOUNTER — APPOINTMENT (OUTPATIENT)
Dept: OBGYN | Facility: CLINIC | Age: 40
End: 2020-07-28
Payer: MEDICAID

## 2020-07-28 PROCEDURE — 99396 PREV VISIT EST AGE 40-64: CPT

## 2020-07-28 PROCEDURE — 87490 CHLMYD TRACH DNA DIR PROBE: CPT

## 2020-08-03 ENCOUNTER — APPOINTMENT (OUTPATIENT)
Dept: OBGYN | Facility: CLINIC | Age: 40
End: 2020-08-03
Payer: MEDICAID

## 2020-08-03 PROCEDURE — 76830 TRANSVAGINAL US NON-OB: CPT

## 2020-08-03 PROCEDURE — 93975 VASCULAR STUDY: CPT

## 2020-08-06 ENCOUNTER — APPOINTMENT (OUTPATIENT)
Dept: OBGYN | Facility: CLINIC | Age: 40
End: 2020-08-06

## 2020-08-11 ENCOUNTER — APPOINTMENT (OUTPATIENT)
Dept: OBGYN | Facility: CLINIC | Age: 40
End: 2020-08-11

## 2020-09-28 ENCOUNTER — APPOINTMENT (OUTPATIENT)
Dept: OBGYN | Facility: CLINIC | Age: 40
End: 2020-09-28

## 2020-10-17 ENCOUNTER — TRANSCRIPTION ENCOUNTER (OUTPATIENT)
Age: 40
End: 2020-10-17

## 2021-01-04 ENCOUNTER — OUTPATIENT (OUTPATIENT)
Dept: OUTPATIENT SERVICES | Facility: HOSPITAL | Age: 41
LOS: 1 days | Discharge: HOME | End: 2021-01-04
Payer: MEDICAID

## 2021-01-04 DIAGNOSIS — J47.9 BRONCHIECTASIS, UNCOMPLICATED: ICD-10-CM

## 2021-01-04 PROCEDURE — 71260 CT THORAX DX C+: CPT | Mod: 26

## 2022-03-10 NOTE — DISCHARGE NOTE ADULT - ABILITY TO HEAR (WITH HEARING AID OR HEARING APPLIANCE IF NORMALLY USED):
Adequate: hears normal conversation without difficulty Finasteride Pregnancy And Lactation Text: This medication is absolutely contraindicated during pregnancy. It is unknown if it is excreted in breast milk.

## 2022-04-13 NOTE — PRE-ANESTHESIA EVALUATION ADULT - HEART RATE (BEATS/MIN)
Submitted PA for [employer] on Cover My Meds on 4/13/22, received fax approval from [address] [place] [address] on 4/13/22, medication is approved from 04/13/2022 until 04/13/2023. See media. 80

## 2022-04-27 ENCOUNTER — EMERGENCY (EMERGENCY)
Facility: HOSPITAL | Age: 42
LOS: 0 days | Discharge: HOME | End: 2022-04-27
Attending: EMERGENCY MEDICINE | Admitting: EMERGENCY MEDICINE
Payer: MEDICAID

## 2022-04-27 VITALS
TEMPERATURE: 99 F | SYSTOLIC BLOOD PRESSURE: 154 MMHG | OXYGEN SATURATION: 100 % | HEIGHT: 64 IN | DIASTOLIC BLOOD PRESSURE: 69 MMHG | WEIGHT: 149.91 LBS | RESPIRATION RATE: 18 BRPM | HEART RATE: 100 BPM

## 2022-04-27 DIAGNOSIS — Z86.69 PERSONAL HISTORY OF OTHER DISEASES OF THE NERVOUS SYSTEM AND SENSE ORGANS: ICD-10-CM

## 2022-04-27 DIAGNOSIS — G43.909 MIGRAINE, UNSPECIFIED, NOT INTRACTABLE, WITHOUT STATUS MIGRAINOSUS: ICD-10-CM

## 2022-04-27 DIAGNOSIS — R05.3 CHRONIC COUGH: ICD-10-CM

## 2022-04-27 DIAGNOSIS — Z86.711 PERSONAL HISTORY OF PULMONARY EMBOLISM: ICD-10-CM

## 2022-04-27 PROCEDURE — 99283 EMERGENCY DEPT VISIT LOW MDM: CPT

## 2022-04-27 NOTE — ED PROVIDER NOTE - PHYSICAL EXAMINATION
CONSTITUTIONAL: Well-appearing; well-nourished; in no apparent distress.   HEAD: Normocephalic; atraumatic.   EYES: Pupils are round and reactive, extra-ocular muscles are intact. Eyelids are normal in appearance without swelling or lesions.   ENT: Hearing is intact with good acuity to spoken voice. Patient is speaking clearly, not muffled and airway is intact.   RESPIRATORY: No signs of respiratory distress. Lung sounds are clear in all lobes bilaterally without rales, rhonchi, or wheezes.  CARDIOVASCULAR: Regular rate and rhythm.   NEURO: A & O x 3. Normal speech.   PSYCHOLOGICAL: Appropriate mood and affect. Good judgement and insight.

## 2022-04-27 NOTE — ED PROVIDER NOTE - NSFOLLOWUPINSTRUCTIONS_ED_ALL_ED_FT
Cough, Adult      Coughing is a reflex that clears your throat and your airways (respiratory system). Coughing helps to heal and protect your lungs. It is normal to cough occasionally, but a cough that happens with other symptoms or lasts a long time may be a sign of a condition that needs treatment. An acute cough may only last 2–3 weeks, while a chronic cough may last 8 or more weeks.    Coughing is commonly caused by:  •Infection of the respiratory systemby viruses or bacteria.      •Breathing in substances that irritate your lungs.      •Allergies.      •Asthma.      •Mucus that runs down the back of your throat (postnasal drip).      •Smoking.      •Acid backing up from the stomach into the esophagus (gastroesophageal reflux).      •Certain medicines.      •Chronic lung problems.      •Other medical conditions such as heart failure or a blood clot in the lung (pulmonary embolism).        Follow these instructions at home:    Medicines     •Take over-the-counter and prescription medicines only as told by your health care provider.      •Talk with your health care provider before you take a cough suppressant medicine.        Lifestyle      •Avoid cigarette smoke. Do not use any products that contain nicotine or tobacco, such as cigarettes, e-cigarettes, and chewing tobacco. If you need help quitting, ask your health care provider.      •Drink enough fluid to keep your urine pale yellow.      •Avoid caffeine.      • Do not drink alcohol if your health care provider tells you not to drink.        General instructions      •Pay close attention to changes in your cough. Tell your health care provider about them.      •Always cover your mouth when you cough.      •Avoid things that make you cough, such as perfume, candles, cleaning products, or campfire or tobacco smoke.      •If the air is dry, use a cool mist vaporizer or humidifier in your bedroom or your home to help loosen secretions.      •If your cough is worse at night, try to sleep in a semi-upright position.      •Rest as needed.      •Keep all follow-up visits as told by your health care provider. This is important.        Contact a health care provider if you:    •Have new symptoms.      •Cough up pus.      •Have a cough that does not get better after 2–3 weeks or gets worse.      •Cannot control your cough with cough suppressant medicines and you are losing sleep.      •Have pain that gets worse or pain that is not helped with medicine.      •Have a fever.      •Have unexplained weight loss.      •Have night sweats.        Get help right away if:    •You cough up blood.      •You have difficulty breathing.      •Your heartbeat is very fast.      These symptoms may represent a serious problem that is an emergency. Do not wait to see if the symptoms will go away. Get medical help right away. Call your local emergency services (911 in the [place] Do not drive yourself to the hospital.       Summary    •Coughing is a reflex that clears your throat and your airways. It is normal to cough occasionally, but a cough that happens with other symptoms or lasts a long time may be a sign of a condition that needs treatment.      •Take over-the-counter and prescription medicines only as told by your health care provider.      •Always cover your mouth when you cough.      •Contact a health care provider if you have new symptoms or a cough that does not get better after 2–3 weeks or gets worse.      This information is not intended to replace advice given to you by your health care provider. Make sure you discuss any questions you have with your health care provider.

## 2022-04-27 NOTE — ED PROVIDER NOTE - CARE PROVIDER_API CALL
Please make sure to follow up with your primary care doctor in 3 days,   Phone: (   )    -  Fax: (   )    -  Follow Up Time:

## 2022-04-27 NOTE — ED PROVIDER NOTE - OBJECTIVE STATEMENT
42 y/o female with hx of PE and chronic cough who presents with cough. Reports that she has chronic cough for 2 years, but her cough seems to be more severe for the past 2 days. Pt has not taken any meds for her symptoms. Denies fever, SOB, chest pain, N/V, abdominal pain, recent traveling, immobilization, surgery, hospitalization, and hx of cancer.

## 2022-04-27 NOTE — ED PROVIDER NOTE - CLINICAL SUMMARY MEDICAL DECISION MAKING FREE TEXT BOX
41-year-old female with history of neurofibromatosis, chronic cough presents to the ED for assessment of persistent cough.  Patient notes clear sputum with cough prompting ED visit.  She has no fever, night sweats, chills.  No recent travel or sick contacts.    Vital signs are normal.  The patient is not coughing in the ED.  She has clear lungs, regular rate and rhythm, soft, nontender, nondistended abdomen.    No indication for abx, steroids at this time, no signs of PNA.     Will dc with tessalon perles, pulm follow up, monitoring of sx, return precautions.

## 2022-04-27 NOTE — ED PROVIDER NOTE - NS ED ROS FT
Constitutional: Negative for fever, chills, and fatigue.  HENT: Negative for headache  Cardiovascular: Negative for chest pain, and palpitation.  Respiratory: + cough. Negative for SOB, wheezing  Gastrointestinal: Negative for nausea, vomiting, abdominal pain  Hematological: Does not bruise/bleed easily.

## 2022-04-27 NOTE — ED PROVIDER NOTE - PATIENT PORTAL LINK FT
You can access the FollowMyHealth Patient Portal offered by [facility] by registering at the following website: [email or web address]. By joining [facility]’s FollowMyHealth portal, you will also be able to view your health information using other applications (apps) compatible with our system.

## 2022-04-27 NOTE — ED PROVIDER NOTE - PROVIDER TOKENS
FREE:[LAST:[Please make sure to follow up with your primary care doctor in 3 days],PHONE:[(   )    -],FAX:[(   )    -]]

## 2022-04-27 NOTE — ED PROVIDER NOTE - NS ED ATTENDING STATEMENT MOD
This was a shared visit with the CHARLEY. I reviewed and verified the documentation and independently performed the documented:

## 2022-04-27 NOTE — ED ADULT TRIAGE NOTE - CHIEF COMPLAINT QUOTE
Pt presents to the ED with c/o of cough. Pt states she has been having a chronic cough since 2016. Recently pt states she has been coughing a lot more phlegm up as well.

## 2022-10-31 ENCOUNTER — NON-APPOINTMENT (OUTPATIENT)
Age: 42
End: 2022-10-31

## 2022-11-01 ENCOUNTER — APPOINTMENT (OUTPATIENT)
Dept: OBGYN | Facility: CLINIC | Age: 42
End: 2022-11-01

## 2022-11-01 DIAGNOSIS — Z01.419 ENCOUNTER FOR GYNECOLOGICAL EXAMINATION (GENERAL) (ROUTINE) W/OUT ABNORMAL FINDINGS: ICD-10-CM

## 2022-11-01 DIAGNOSIS — N94.3 PREMENSTRUAL TENSION SYNDROME: ICD-10-CM

## 2022-11-01 PROCEDURE — 99396 PREV VISIT EST AGE 40-64: CPT

## 2022-11-01 PROCEDURE — 99213 OFFICE O/P EST LOW 20 MIN: CPT | Mod: 25

## 2022-11-01 NOTE — HISTORY OF PRESENT ILLNESS
[FreeTextEntry1] :  ---41 Y/O p5 LMP STARTED TODAY HERE FOR CHECK UP;PT ALSO C/O PMS AND MOOD CHANGES;\par PMHX: migraines, neurofibroma\par SOCIAL:   -ETOH           -CIGG x\par STD:                                      DISCUSSED CONDOMS x\par FAMILY HX OF BREAST CANCER   OVARIAN   UTERINE CA: \par REVIEW OF SYMPTOMS DONE x\par ALLERGIES:     Patient has answered NKDA\par Medication reconciliation was completed by reviewing, with the patient’s involvement, the patient’s current outpatient medications and those ordered for the patient today. topamax\par \par PE:    BREASTS   -MASSES DC NODES:   SELF BREAST EXAM REVIEWED\par ABD SOFT NT ND\par NL GENIT \par VAGINA  -DC\par CX  -DC\par UTERUS NL SIZE MILD TENDER\par ADNEXA NT  -MASSES\par \par A;P;CHECK UP, PELVIC PAIN,PMS\par -PAP GC CHLAMYDIA\par -SONO\par -CHRISTIANE\par -DISCUSSED PMS  RX OPTIONS.\par -F-U AFTER ABOVE.

## 2022-11-15 ENCOUNTER — TRANSCRIPTION ENCOUNTER (OUTPATIENT)
Age: 42
End: 2022-11-15

## 2022-12-06 ENCOUNTER — APPOINTMENT (OUTPATIENT)
Dept: OBGYN | Facility: CLINIC | Age: 42
End: 2022-12-06

## 2022-12-06 PROCEDURE — 93976 VASCULAR STUDY: CPT

## 2022-12-06 PROCEDURE — 76830 TRANSVAGINAL US NON-OB: CPT

## 2022-12-06 PROCEDURE — 99213 OFFICE O/P EST LOW 20 MIN: CPT

## 2023-01-23 ENCOUNTER — APPOINTMENT (OUTPATIENT)
Dept: OBGYN | Facility: CLINIC | Age: 43
End: 2023-01-23
Payer: MEDICAID

## 2023-01-23 DIAGNOSIS — Z30.9 ENCOUNTER FOR CONTRACEPTIVE MANAGEMENT, UNSPECIFIED: ICD-10-CM

## 2023-01-23 PROCEDURE — 99213 OFFICE O/P EST LOW 20 MIN: CPT

## 2023-01-23 NOTE — HISTORY OF PRESENT ILLNESS
Pt scripts x3 instructed to follow up with PCP.  Assessed per Dr Nichole Campos, LPN  26/10/44 8784 [FreeTextEntry1] : 41 y/o p5 lmp-2 weeks ago\par done with childbearing\par \par Pt sent for btl consultation\par \par

## 2023-01-23 NOTE — DISCUSSION/SUMMARY
[FreeTextEntry1] : options given\par surgical-due to hx PE-high risk for pe,dvt/mi/.stroke\par counseled against laparoscopy due to RISK\par \par options of IUD discussed\par pt will get IUD

## 2023-02-21 ENCOUNTER — APPOINTMENT (OUTPATIENT)
Dept: OBGYN | Facility: CLINIC | Age: 43
End: 2023-02-21
Payer: MEDICAID

## 2023-02-21 DIAGNOSIS — N83.209 UNSPECIFIED OVARIAN CYST, UNSPECIFIED SIDE: ICD-10-CM

## 2023-02-21 PROCEDURE — ZZZZZ: CPT

## 2023-02-21 PROCEDURE — 76830 TRANSVAGINAL US NON-OB: CPT

## 2023-02-21 PROCEDURE — 99213 OFFICE O/P EST LOW 20 MIN: CPT | Mod: 25

## 2023-02-21 PROCEDURE — 93975 VASCULAR STUDY: CPT

## 2023-02-21 NOTE — HISTORY OF PRESENT ILLNESS
[FreeTextEntry1] :  ---41 Y/O p5  HERE FOR F-U;SONO TODAY. HX OF OVARIAN CYST;PT ON PROZAC FOR PMS\par ;PT ALSO C/O PMS AND MOOD CHANGES;\par PMHX: migraines, neurofibroma  PULMONARY EMBOLUS\par SOCIAL:   -ETOH           -CIGG x\par STD:                                      DISCUSSED CONDOMS x\par FAMILY HX OF BREAST CANCER   OVARIAN   UTERINE CA: \par REVIEW OF SYMPTOMS DONE x\par ALLERGIES:     Patient has answered NKDA\par Medication reconciliation was completed by reviewing, with the patient’s involvement, the patient’s current outpatient medications and those ordered for the patient today. topamax\par \par PE:    \par ABD SOFT NT ND\par EXT -CCE\par \par A;P;OVARIAN CYST,PMS\par -F-U SONO IN 10 WEEKS\par -PROZAC 10MG QD RBA DISCUSSED\par -ANSWERED QUESTIONS.\par

## 2023-02-27 ENCOUNTER — APPOINTMENT (OUTPATIENT)
Dept: OBGYN | Facility: CLINIC | Age: 43
End: 2023-02-27

## 2023-02-28 ENCOUNTER — APPOINTMENT (OUTPATIENT)
Dept: OBGYN | Facility: CLINIC | Age: 43
End: 2023-02-28
Payer: MEDICAID

## 2023-02-28 PROCEDURE — 99213 OFFICE O/P EST LOW 20 MIN: CPT | Mod: 25

## 2023-02-28 PROCEDURE — 58300 INSERT INTRAUTERINE DEVICE: CPT

## 2023-02-28 NOTE — HISTORY OF PRESENT ILLNESS
[FreeTextEntry1] :  ---41 Y/O p5 LMP TODAY HERE FOR IUD INSERTION;PT ALSO C/O CRAMPS.;\par PMHX: migraines, neurofibroma\par SOCIAL:   -ETOH           -CIGG x\par STD:                                      DISCUSSED CONDOMS x\par FAMILY HX OF BREAST CANCER   OVARIAN   UTERINE CA: \par REVIEW OF SYMPTOMS DONE x\par ALLERGIES:     Patient has answered NKDA\par Medication reconciliation was completed by reviewing, with the patient’s involvement, the patient’s current outpatient medications and those ordered for the patient today. topamax\par \par PE:    \par ABD SOFT NT ND\par NL GENIT \par VAGINA  -DC MENSES\par CX  -DC\par UTERUS NL SIZE NON TENDER\par ADNEXA NT  -MASSES\par \par \par -TIME OUT DONE  EBL LESS THAN 5CC\par -PARAGARD IUD PLACED WITHOUT INCIDENT  LOT  [id number]  EXP  7/2028\par -INSTRUCTIONS REVIEWED\par -RTC 4 WEEKS

## 2023-03-13 ENCOUNTER — APPOINTMENT (OUTPATIENT)
Dept: OBGYN | Facility: CLINIC | Age: 43
End: 2023-03-13
Payer: MEDICAID

## 2023-03-13 DIAGNOSIS — R10.2 PELVIC AND PERINEAL PAIN: ICD-10-CM

## 2023-03-13 PROCEDURE — 99213 OFFICE O/P EST LOW 20 MIN: CPT | Mod: 25

## 2023-03-13 PROCEDURE — 76830 TRANSVAGINAL US NON-OB: CPT

## 2023-03-13 NOTE — HISTORY OF PRESENT ILLNESS
[FreeTextEntry1] :  ---43 Y/O p5 LMP TODAY HERE FOR F-U AFTER  IUD INSERTION;PT ALSO C/O CRAMPS.;\par PMHX: migraines, neurofibroma\par SOCIAL:   -ETOH           -CIGG x\par STD:                                      DISCUSSED CONDOMS x\par FAMILY HX OF BREAST CANCER   OVARIAN   UTERINE CA: \par REVIEW OF SYMPTOMS DONE x\par ALLERGIES:     Patient has answered NKDA\par Medication reconciliation was completed by reviewing, with the patient’s involvement, the patient’s current outpatient medications and those ordered for the patient today. topamax\par \par PE:    \par ABD SOFT NT ND\par NL GENIT \par VAGINA  -DC \par CX  -DC\par UTERUS NL SIZE NON TENDER\par ADNEXA NT  -MASSES\par \par \par A;P;MILD CRAMPS AFTER IUD INSERTION\par -SONO REVIEWED\par -F-U PRN.

## 2023-05-23 ENCOUNTER — APPOINTMENT (OUTPATIENT)
Dept: OBGYN | Facility: CLINIC | Age: 43
End: 2023-05-23

## 2023-07-17 ENCOUNTER — RX RENEWAL (OUTPATIENT)
Age: 43
End: 2023-07-17

## 2023-09-07 ENCOUNTER — OUTPATIENT (OUTPATIENT)
Dept: OUTPATIENT SERVICES | Facility: HOSPITAL | Age: 43
LOS: 1 days | End: 2023-09-07
Payer: MEDICAID

## 2023-09-07 DIAGNOSIS — Z12.31 ENCOUNTER FOR SCREENING MAMMOGRAM FOR MALIGNANT NEOPLASM OF BREAST: ICD-10-CM

## 2023-09-07 PROCEDURE — 77067 SCR MAMMO BI INCL CAD: CPT | Mod: 26

## 2023-09-07 PROCEDURE — 77067 SCR MAMMO BI INCL CAD: CPT

## 2023-09-07 PROCEDURE — 77063 BREAST TOMOSYNTHESIS BI: CPT

## 2023-09-07 PROCEDURE — 77063 BREAST TOMOSYNTHESIS BI: CPT | Mod: 26

## 2023-09-08 DIAGNOSIS — Z12.31 ENCOUNTER FOR SCREENING MAMMOGRAM FOR MALIGNANT NEOPLASM OF BREAST: ICD-10-CM

## 2023-10-25 ENCOUNTER — NON-APPOINTMENT (OUTPATIENT)
Age: 43
End: 2023-10-25

## 2023-11-06 ENCOUNTER — OUTPATIENT (OUTPATIENT)
Dept: OUTPATIENT SERVICES | Facility: HOSPITAL | Age: 43
LOS: 1 days | End: 2023-11-06
Payer: MEDICAID

## 2023-11-06 DIAGNOSIS — R92.8 OTHER ABNORMAL AND INCONCLUSIVE FINDINGS ON DIAGNOSTIC IMAGING OF BREAST: ICD-10-CM

## 2023-11-06 PROCEDURE — 76642 ULTRASOUND BREAST LIMITED: CPT | Mod: RT

## 2023-11-06 PROCEDURE — G0279: CPT

## 2023-11-06 PROCEDURE — 76642 ULTRASOUND BREAST LIMITED: CPT | Mod: 26,RT

## 2023-11-06 PROCEDURE — 77065 DX MAMMO INCL CAD UNI: CPT | Mod: RT

## 2023-11-06 PROCEDURE — 77065 DX MAMMO INCL CAD UNI: CPT | Mod: 26,RT

## 2023-11-06 PROCEDURE — 77061 BREAST TOMOSYNTHESIS UNI: CPT | Mod: 26

## 2023-11-07 DIAGNOSIS — R92.8 OTHER ABNORMAL AND INCONCLUSIVE FINDINGS ON DIAGNOSTIC IMAGING OF BREAST: ICD-10-CM

## 2023-11-14 ENCOUNTER — APPOINTMENT (OUTPATIENT)
Dept: PULMONOLOGY | Facility: CLINIC | Age: 43
End: 2023-11-14
Payer: MEDICAID

## 2023-11-14 VITALS
WEIGHT: 157 LBS | OXYGEN SATURATION: 98 % | HEART RATE: 106 BPM | DIASTOLIC BLOOD PRESSURE: 76 MMHG | RESPIRATION RATE: 14 BRPM | HEIGHT: 64 IN | BODY MASS INDEX: 26.8 KG/M2 | SYSTOLIC BLOOD PRESSURE: 114 MMHG

## 2023-11-14 PROCEDURE — 99214 OFFICE O/P EST MOD 30 MIN: CPT

## 2023-11-14 RX ORDER — ALBUTEROL SULFATE 90 UG/1
108 (90 BASE) INHALANT RESPIRATORY (INHALATION)
Qty: 1 | Refills: 3 | Status: ACTIVE | COMMUNITY
Start: 2023-11-14 | End: 1900-01-01

## 2024-03-14 ENCOUNTER — RX RENEWAL (OUTPATIENT)
Age: 44
End: 2024-03-14

## 2024-03-14 RX ORDER — FLUOXETINE HYDROCHLORIDE 10 MG/1
10 CAPSULE ORAL
Qty: 30 | Refills: 6 | Status: ACTIVE | COMMUNITY
Start: 2022-12-06 | End: 1900-01-01

## 2024-05-07 ENCOUNTER — RESULT REVIEW (OUTPATIENT)
Age: 44
End: 2024-05-07

## 2024-05-07 ENCOUNTER — OUTPATIENT (OUTPATIENT)
Dept: OUTPATIENT SERVICES | Facility: HOSPITAL | Age: 44
LOS: 1 days | End: 2024-05-07
Payer: MEDICAID

## 2024-05-07 DIAGNOSIS — Z00.8 ENCOUNTER FOR OTHER GENERAL EXAMINATION: ICD-10-CM

## 2024-05-07 DIAGNOSIS — J47.9 BRONCHIECTASIS, UNCOMPLICATED: ICD-10-CM

## 2024-05-07 PROCEDURE — 71250 CT THORAX DX C-: CPT

## 2024-05-07 PROCEDURE — 71250 CT THORAX DX C-: CPT | Mod: 26

## 2024-05-08 DIAGNOSIS — J47.9 BRONCHIECTASIS, UNCOMPLICATED: ICD-10-CM

## 2024-05-15 ENCOUNTER — NON-APPOINTMENT (OUTPATIENT)
Age: 44
End: 2024-05-15

## 2024-06-11 ENCOUNTER — APPOINTMENT (OUTPATIENT)
Dept: PULMONOLOGY | Facility: CLINIC | Age: 44
End: 2024-06-11
Payer: MEDICAID

## 2024-06-11 VITALS
WEIGHT: 147 LBS | BODY MASS INDEX: 25.1 KG/M2 | HEIGHT: 64 IN | HEART RATE: 98 BPM | SYSTOLIC BLOOD PRESSURE: 124 MMHG | DIASTOLIC BLOOD PRESSURE: 90 MMHG | OXYGEN SATURATION: 99 %

## 2024-06-11 DIAGNOSIS — J98.4 OTHER DISORDERS OF LUNG: ICD-10-CM

## 2024-06-11 DIAGNOSIS — I26.99 OTHER PULMONARY EMBOLISM W/OUT ACUTE COR PULMONALE: ICD-10-CM

## 2024-06-11 DIAGNOSIS — J47.9 BRONCHIECTASIS, UNCOMPLICATED: ICD-10-CM

## 2024-06-11 PROCEDURE — G2211 COMPLEX E/M VISIT ADD ON: CPT | Mod: NC,1L

## 2024-06-11 PROCEDURE — 99214 OFFICE O/P EST MOD 30 MIN: CPT

## 2024-06-11 NOTE — HISTORY OF PRESENT ILLNESS
[TextBox_4] : - Summary : The patient, Cherelle Stephen, presented with complaints of increased coughing, noisy breathing, and a feeling of general malaise. - Chief Complaint (CC) : Increased coughing, noisy breathing, and feeling unwell. - History of Present Illness (HPI) : - Increased coughing, especially in the morning and throughout the day  - Noisy breathing with a crackling sound, more pronounced when lying on the left side  - No reported bad tastes  - Feeling generally unwell and drained  - Fever  Objective: - Diagnostic Results : - Sputum culture revealed growth of Candida lusitaniae (a fungus) and Mycobacterium avium intracellulare (CHARLY or MAC, an atypical mycobacterial infection related to tuberculosis)  - Previous CT scan findings mentioned the presence of holes or cavities in the lungs, with one cavity measuring approximately 4 centimeters

## 2024-06-11 NOTE — ASSESSMENT
[FreeTextEntry1] : Assessment: - Summary : The patient has been diagnosed with a pulmonary infection involving Candida lusitaniae (a fungal infection) and Mycobacterium avium intracellulare (CHARLY or MAC, an atypical mycobacterial infection). - Problems : - Pulmonary Candidiasis (Candida lusitaniae infection)  - Atypical Mycobacterial Infection (Mycobacterium avium intracellulare, CHARLY or MAC)  - Differential Diagnosis : - The differential diagnosis includes other pulmonary infections, such as bacterial pneumonia or tuberculosis, as well as non-infectious conditions like lung cancer or chronic obstructive pulmonary disease (COPD).  - However, based on the diagnostic results, the primary diagnoses are Candida lusitaniae infection and Mycobacterium avium intracellulare (CHARLY or MAC) infection.  Plan: - Summary : The plan involves referring the patient to an infectious disease specialist for management with multiple antibiotics to treat the Candida and atypical mycobacterial infections. Follow-up with repeat imaging and sputum testing is recommended after initiating treatment. - Plan : - Refer the patient to an infectious disease specialist for management of the pulmonary infections  - The infectious disease specialist will prescribe a combination of antibiotics to treat both the Candida lusitaniae and Mycobacterium avium intracellulare (CHARLY or MAC) infections  - Fax the diagnostic results and reports to the infectious disease specialist  - Advise the patient to seek immediate medical attention at the hospital if their condition worsens or they feel significantly unwell before starting treatment  - Follow-up with the patient in approximately 3 months  - Repeat CT scan and sputum testing to assess treatment response and clearance of infections  - Adjust treatment plan as needed based on follow-up results

## 2024-06-20 ENCOUNTER — NON-APPOINTMENT (OUTPATIENT)
Age: 44
End: 2024-06-20

## 2024-09-16 ENCOUNTER — APPOINTMENT (OUTPATIENT)
Dept: PULMONOLOGY | Facility: CLINIC | Age: 44
End: 2024-09-16

## 2024-09-22 ENCOUNTER — INPATIENT (INPATIENT)
Facility: HOSPITAL | Age: 44
LOS: 4 days | Discharge: HOME | DRG: 861 | End: 2024-09-27
Attending: INTERNAL MEDICINE | Admitting: STUDENT IN AN ORGANIZED HEALTH CARE EDUCATION/TRAINING PROGRAM
Payer: MEDICAID

## 2024-09-22 VITALS
WEIGHT: 145.95 LBS | DIASTOLIC BLOOD PRESSURE: 68 MMHG | HEIGHT: 64 IN | OXYGEN SATURATION: 100 % | TEMPERATURE: 100 F | RESPIRATION RATE: 26 BRPM | SYSTOLIC BLOOD PRESSURE: 122 MMHG | HEART RATE: 128 BPM

## 2024-09-22 DIAGNOSIS — Z86.711 PERSONAL HISTORY OF PULMONARY EMBOLISM: ICD-10-CM

## 2024-09-22 LAB
ALBUMIN SERPL ELPH-MCNC: 4 G/DL — SIGNIFICANT CHANGE UP (ref 3.5–5.2)
ALP SERPL-CCNC: 74 U/L — SIGNIFICANT CHANGE UP (ref 30–115)
ALT FLD-CCNC: 6 U/L — SIGNIFICANT CHANGE UP (ref 0–41)
ANION GAP SERPL CALC-SCNC: 21 MMOL/L — HIGH (ref 7–14)
APTT BLD: 31.3 SEC — SIGNIFICANT CHANGE UP (ref 27–39.2)
AST SERPL-CCNC: 12 U/L — SIGNIFICANT CHANGE UP (ref 0–41)
BASOPHILS # BLD AUTO: 0.3 K/UL — HIGH (ref 0–0.2)
BASOPHILS NFR BLD AUTO: 1.7 % — HIGH (ref 0–1)
BILIRUB SERPL-MCNC: 0.4 MG/DL — SIGNIFICANT CHANGE UP (ref 0.2–1.2)
BUN SERPL-MCNC: 7 MG/DL — LOW (ref 10–20)
CALCIUM SERPL-MCNC: 9.4 MG/DL — SIGNIFICANT CHANGE UP (ref 8.4–10.4)
CHLORIDE SERPL-SCNC: 99 MMOL/L — SIGNIFICANT CHANGE UP (ref 98–110)
CO2 SERPL-SCNC: 20 MMOL/L — SIGNIFICANT CHANGE UP (ref 17–32)
CREAT SERPL-MCNC: 0.7 MG/DL — SIGNIFICANT CHANGE UP (ref 0.7–1.5)
EGFR: 109 ML/MIN/1.73M2 — SIGNIFICANT CHANGE UP
EOSINOPHIL # BLD AUTO: 0 K/UL — SIGNIFICANT CHANGE UP (ref 0–0.7)
EOSINOPHIL NFR BLD AUTO: 0 % — SIGNIFICANT CHANGE UP (ref 0–8)
FLUAV AG NPH QL: SIGNIFICANT CHANGE UP
FLUBV AG NPH QL: SIGNIFICANT CHANGE UP
GLUCOSE SERPL-MCNC: 81 MG/DL — SIGNIFICANT CHANGE UP (ref 70–99)
HCG SERPL QL: NEGATIVE — SIGNIFICANT CHANGE UP
HCT VFR BLD CALC: 36.2 % — LOW (ref 37–47)
HGB BLD-MCNC: 11.3 G/DL — LOW (ref 12–16)
INR BLD: 1.48 RATIO — HIGH (ref 0.65–1.3)
LACTATE SERPL-SCNC: 1 MMOL/L — SIGNIFICANT CHANGE UP (ref 0.7–2)
LYMPHOCYTES # BLD AUTO: 14.8 % — LOW (ref 20.5–51.1)
LYMPHOCYTES # BLD AUTO: 2.58 K/UL — SIGNIFICANT CHANGE UP (ref 1.2–3.4)
MCHC RBC-ENTMCNC: 22.8 PG — LOW (ref 27–31)
MCHC RBC-ENTMCNC: 31.2 G/DL — LOW (ref 32–37)
MCV RBC AUTO: 73 FL — LOW (ref 81–99)
MONOCYTES # BLD AUTO: 0.45 K/UL — SIGNIFICANT CHANGE UP (ref 0.1–0.6)
MONOCYTES NFR BLD AUTO: 2.6 % — SIGNIFICANT CHANGE UP (ref 1.7–9.3)
NEUTROPHILS # BLD AUTO: 12.76 K/UL — HIGH (ref 1.4–6.5)
NEUTROPHILS NFR BLD AUTO: 72.2 % — SIGNIFICANT CHANGE UP (ref 42.2–75.2)
NT-PROBNP SERPL-SCNC: <36 PG/ML — SIGNIFICANT CHANGE UP (ref 0–300)
PLATELET # BLD AUTO: 502 K/UL — HIGH (ref 130–400)
PMV BLD: 9.9 FL — SIGNIFICANT CHANGE UP (ref 7.4–10.4)
POTASSIUM SERPL-MCNC: 4.2 MMOL/L — SIGNIFICANT CHANGE UP (ref 3.5–5)
POTASSIUM SERPL-SCNC: 4.2 MMOL/L — SIGNIFICANT CHANGE UP (ref 3.5–5)
PROT SERPL-MCNC: 8.3 G/DL — HIGH (ref 6–8)
PROTHROM AB SERPL-ACNC: 16.9 SEC — HIGH (ref 9.95–12.87)
RBC # BLD: 4.96 M/UL — SIGNIFICANT CHANGE UP (ref 4.2–5.4)
RBC # FLD: 14.6 % — HIGH (ref 11.5–14.5)
RSV RNA NPH QL NAA+NON-PROBE: SIGNIFICANT CHANGE UP
SARS-COV-2 RNA SPEC QL NAA+PROBE: SIGNIFICANT CHANGE UP
SODIUM SERPL-SCNC: 140 MMOL/L — SIGNIFICANT CHANGE UP (ref 135–146)
TROPONIN T, HIGH SENSITIVITY RESULT: <6 NG/L — SIGNIFICANT CHANGE UP (ref 6–13)
WBC # BLD: 17.45 K/UL — HIGH (ref 4.8–10.8)
WBC # FLD AUTO: 17.45 K/UL — HIGH (ref 4.8–10.8)

## 2024-09-22 PROCEDURE — 87015 SPECIMEN INFECT AGNT CONCNTJ: CPT

## 2024-09-22 PROCEDURE — 86038 ANTINUCLEAR ANTIBODIES: CPT

## 2024-09-22 PROCEDURE — 87640 STAPH A DNA AMP PROBE: CPT

## 2024-09-22 PROCEDURE — 84145 PROCALCITONIN (PCT): CPT

## 2024-09-22 PROCEDURE — 85220 BLOOC CLOT FACTOR V TEST: CPT

## 2024-09-22 PROCEDURE — 83540 ASSAY OF IRON: CPT

## 2024-09-22 PROCEDURE — 81270 JAK2 GENE: CPT

## 2024-09-22 PROCEDURE — 87070 CULTURE OTHR SPECIMN AEROBIC: CPT

## 2024-09-22 PROCEDURE — 85027 COMPLETE CBC AUTOMATED: CPT

## 2024-09-22 PROCEDURE — 87899 AGENT NOS ASSAY W/OPTIC: CPT

## 2024-09-22 PROCEDURE — 87641 MR-STAPH DNA AMP PROBE: CPT

## 2024-09-22 PROCEDURE — 86146 BETA-2 GLYCOPROTEIN ANTIBODY: CPT

## 2024-09-22 PROCEDURE — 94640 AIRWAY INHALATION TREATMENT: CPT

## 2024-09-22 PROCEDURE — 86235 NUCLEAR ANTIGEN ANTIBODY: CPT

## 2024-09-22 PROCEDURE — 87449 NOS EACH ORGANISM AG IA: CPT

## 2024-09-22 PROCEDURE — 85025 COMPLETE CBC W/AUTO DIFF WBC: CPT

## 2024-09-22 PROCEDURE — 87186 SC STD MICRODIL/AGAR DIL: CPT

## 2024-09-22 PROCEDURE — 83735 ASSAY OF MAGNESIUM: CPT

## 2024-09-22 PROCEDURE — 87116 MYCOBACTERIA CULTURE: CPT

## 2024-09-22 PROCEDURE — 87556 M.TUBERCULO DNA AMP PROBE: CPT

## 2024-09-22 PROCEDURE — 93010 ELECTROCARDIOGRAM REPORT: CPT | Mod: 77

## 2024-09-22 PROCEDURE — 71275 CT ANGIOGRAPHY CHEST: CPT | Mod: 26,MC

## 2024-09-22 PROCEDURE — 36415 COLL VENOUS BLD VENIPUNCTURE: CPT

## 2024-09-22 PROCEDURE — 86036 ANCA SCREEN EACH ANTIBODY: CPT

## 2024-09-22 PROCEDURE — 86147 CARDIOLIPIN ANTIBODY EA IG: CPT

## 2024-09-22 PROCEDURE — 82728 ASSAY OF FERRITIN: CPT

## 2024-09-22 PROCEDURE — 86200 CCP ANTIBODY: CPT

## 2024-09-22 PROCEDURE — 87040 BLOOD CULTURE FOR BACTERIA: CPT

## 2024-09-22 PROCEDURE — 86140 C-REACTIVE PROTEIN: CPT

## 2024-09-22 PROCEDURE — 80053 COMPREHEN METABOLIC PANEL: CPT

## 2024-09-22 PROCEDURE — 85303 CLOT INHIBIT PROT C ACTIVITY: CPT

## 2024-09-22 PROCEDURE — 85306 CLOT INHIBIT PROT S FREE: CPT

## 2024-09-22 PROCEDURE — 84484 ASSAY OF TROPONIN QUANT: CPT

## 2024-09-22 PROCEDURE — 99285 EMERGENCY DEPT VISIT HI MDM: CPT

## 2024-09-22 PROCEDURE — 81001 URINALYSIS AUTO W/SCOPE: CPT

## 2024-09-22 PROCEDURE — 86431 RHEUMATOID FACTOR QUANT: CPT

## 2024-09-22 PROCEDURE — 85730 THROMBOPLASTIN TIME PARTIAL: CPT

## 2024-09-22 PROCEDURE — 83605 ASSAY OF LACTIC ACID: CPT

## 2024-09-22 PROCEDURE — 87798 DETECT AGENT NOS DNA AMP: CPT

## 2024-09-22 PROCEDURE — 99223 1ST HOSP IP/OBS HIGH 75: CPT

## 2024-09-22 PROCEDURE — 86480 TB TEST CELL IMMUN MEASURE: CPT

## 2024-09-22 PROCEDURE — 71045 X-RAY EXAM CHEST 1 VIEW: CPT | Mod: 26

## 2024-09-22 PROCEDURE — 85652 RBC SED RATE AUTOMATED: CPT

## 2024-09-22 PROCEDURE — 87206 SMEAR FLUORESCENT/ACID STAI: CPT

## 2024-09-22 PROCEDURE — 85610 PROTHROMBIN TIME: CPT

## 2024-09-22 PROCEDURE — 80048 BASIC METABOLIC PNL TOTAL CA: CPT

## 2024-09-22 PROCEDURE — 83550 IRON BINDING TEST: CPT

## 2024-09-22 PROCEDURE — 93970 EXTREMITY STUDY: CPT

## 2024-09-22 RX ORDER — SODIUM CHLORIDE IRRIG SOLUTION 0.9 %
1000 SOLUTION, IRRIGATION IRRIGATION ONCE
Refills: 0 | Status: COMPLETED | OUTPATIENT
Start: 2024-09-22 | End: 2024-09-22

## 2024-09-22 RX ORDER — ACETAMINOPHEN 325 MG
650 TABLET ORAL ONCE
Refills: 0 | Status: COMPLETED | OUTPATIENT
Start: 2024-09-22 | End: 2024-09-22

## 2024-09-22 RX ADMIN — Medication 650 MILLIGRAM(S): at 17:36

## 2024-09-22 RX ADMIN — Medication 1200 UNIT(S)/HR: at 22:26

## 2024-09-22 RX ADMIN — Medication 5500 UNIT(S): at 22:25

## 2024-09-22 RX ADMIN — Medication 1000 MILLILITER(S): at 17:36

## 2024-09-22 RX ADMIN — Medication 650 MILLIGRAM(S): at 18:30

## 2024-09-22 NOTE — ED ADULT NURSE NOTE - TEMPLATE
General no palpitations/no dyspnea on exertion/no orthopnea/no paroxysmal nocturnal dyspnea/no peripheral edema/no claudication

## 2024-09-22 NOTE — ED PROVIDER NOTE - CLINICAL SUMMARY MEDICAL DECISION MAKING FREE TEXT BOX
I have considered a variety of diagnoses for this patient, including but not limited to:  pe, ptx, pona, infectious    plan: imaging, labs, reassess, meds    dispo: admit

## 2024-09-22 NOTE — ED PROVIDER NOTE - PHYSICAL EXAMINATION
VITAL SIGNS: I have reviewed nursing notes and confirm.  CONSTITUTIONAL: well-appearing, non-toxic, NAD  SKIN: Warm dry, normal skin turgor  HEAD: NCAT  EYES: EOMI, PERRLA, no scleral icterus  ENT: Moist mucous membranes, normal pharynx with no erythema or exudates  NECK: Supple; non tender. Full ROM. No cervical LAD  CARD: RRR, no murmurs, rubs or gallops  RESP: clear to ausculation b/l.  No rales, rhonchi, or wheezing. tachypnea  ABD: soft, + BS, non-tender, non-distended, no rebound or guarding. No CVA tenderness  EXT: Full ROM, no bony tenderness, no pedal edema, no calf tenderness  NEURO: normal motor. normal sensory. CN II-XII intact. Cerebellar testing normal. Normal gait.  PSYCH: Cooperative, appropriate.

## 2024-09-22 NOTE — ED PROVIDER NOTE - NSTIMEPROVIDERCAREINITIATE_GEN_ER
62yo M w/ PMHx CAD s/p CABG and stents (most recent on 5/9/2018 to pLAARTEM), HTN, HLD, gout presents to the ED for left sided chest discomfort/epigastric discomfort 62yo M w/ PMHx CAD s/p CABG and stents (most recent on 5/9/2018 to p[facility]ARTEM), HTN, HLD, gout presents to the ED for left sided chest discomfort/epigastric discomfort Problem/Plan - 1:  ·  Problem: chest pain.  Plan: telemonitor  cards fu  cw current meenu  Ischemia munoz as per cards    Problem/Plan - 2:  ·  Problem: abdominal pain Plan GI fu    Problem/Plan - 3:  ·  Problem: HTN (hypertension).  Plan: cw home meds    Problem/Plan - 4:  ·  Problem: CAD (coronary artery disease).  Plan: Continue with aspirin and plavix.   cards following Problem/Plan - 1:  ·  Problem: chest pain.  Plan: telemonitor  cards fu  cw current meenu  Ischemia munoz as per cards    Problem/Plan - 2:  ·  Problem: abdominal pain Plan GI fu    Problem/Plan - 3:  ·  Problem: HTN (hypertension).  Plan: cw home meds    Problem/Plan - 4:  ·  Problem: CAD (coronary artery disease).  Plan: Continue with aspirin and plavix.   cards following Problem/Plan - 1:  ·  Problem: chest pain.  Plan: telemonitor  cards fu  cw current meenu  Ischemia munoz as per cards    Problem/Plan - 2:  ·  Problem: abdominal pain Plan GI fu    Problem/Plan - 3:  ·  Problem: HTN (hypertension).  Plan: cw home meds    Problem/Plan - 4:  ·  Problem: CAD (coronary artery disease).  Plan: Continue with aspirin and plavix.   cards following 22-Sep-2024 17:17

## 2024-09-22 NOTE — ED ADULT NURSE NOTE - OBJECTIVE STATEMENT
Patient presents to ED with complaints of SOB and productive cough. Patient reports subjective chills. PMH PE not on AC.

## 2024-09-22 NOTE — ED PROVIDER NOTE - OBJECTIVE STATEMENT
44 y F pmhx PE previously on eliquis, self weaned, now c/o cough, clear frothy sputum, and low grade temp x1d. no other complaints.

## 2024-09-22 NOTE — ED PROVIDER NOTE - PROGRESS NOTE DETAILS
spoke w/ icu fellow; pt w/o RHS, trop pending; BNP neg; stable for floor and tele;   MAR to consult PULM

## 2024-09-22 NOTE — ED ADULT TRIAGE NOTE - CHIEF COMPLAINT QUOTE
pt c/o shortness of breath and cough since Thursday, states she has worse pain while laying down. has a history of PE

## 2024-09-23 LAB
ALBUMIN SERPL ELPH-MCNC: 3.6 G/DL — SIGNIFICANT CHANGE UP (ref 3.5–5.2)
ALP SERPL-CCNC: 64 U/L — SIGNIFICANT CHANGE UP (ref 30–115)
ALT FLD-CCNC: <5 U/L — SIGNIFICANT CHANGE UP (ref 0–41)
ANION GAP SERPL CALC-SCNC: 8 MMOL/L — SIGNIFICANT CHANGE UP (ref 7–14)
APTT BLD: 32.5 SEC — SIGNIFICANT CHANGE UP (ref 27–39.2)
APTT BLD: 62.8 SEC — HIGH (ref 27–39.2)
AST SERPL-CCNC: 13 U/L — SIGNIFICANT CHANGE UP (ref 0–41)
BASOPHILS # BLD AUTO: 0.11 K/UL — SIGNIFICANT CHANGE UP (ref 0–0.2)
BASOPHILS NFR BLD AUTO: 1 % — SIGNIFICANT CHANGE UP (ref 0–1)
BILIRUB SERPL-MCNC: 0.2 MG/DL — SIGNIFICANT CHANGE UP (ref 0.2–1.2)
BUN SERPL-MCNC: 4 MG/DL — LOW (ref 10–20)
CALCIUM SERPL-MCNC: 9 MG/DL — SIGNIFICANT CHANGE UP (ref 8.4–10.5)
CHLORIDE SERPL-SCNC: 97 MMOL/L — LOW (ref 98–110)
CO2 SERPL-SCNC: 23 MMOL/L — SIGNIFICANT CHANGE UP (ref 17–32)
CREAT SERPL-MCNC: 0.5 MG/DL — LOW (ref 0.7–1.5)
EGFR: 119 ML/MIN/1.73M2 — SIGNIFICANT CHANGE UP
EOSINOPHIL # BLD AUTO: 0.26 K/UL — SIGNIFICANT CHANGE UP (ref 0–0.7)
EOSINOPHIL NFR BLD AUTO: 2.4 % — SIGNIFICANT CHANGE UP (ref 0–8)
FERRITIN SERPL-MCNC: 147 NG/ML — SIGNIFICANT CHANGE UP (ref 15–150)
GLUCOSE SERPL-MCNC: 86 MG/DL — SIGNIFICANT CHANGE UP (ref 70–99)
HCT VFR BLD CALC: 32.4 % — LOW (ref 37–47)
HCT VFR BLD CALC: 32.5 % — LOW (ref 37–47)
HGB BLD-MCNC: 10 G/DL — LOW (ref 12–16)
HGB BLD-MCNC: 10.2 G/DL — LOW (ref 12–16)
IMM GRANULOCYTES NFR BLD AUTO: 0.9 % — HIGH (ref 0.1–0.3)
INR BLD: 1.36 RATIO — HIGH (ref 0.65–1.3)
IRON SATN MFR SERPL: 15 UG/DL — LOW (ref 35–150)
IRON SATN MFR SERPL: 8 % — LOW (ref 15–50)
LACTATE SERPL-SCNC: 1.7 MMOL/L — SIGNIFICANT CHANGE UP (ref 0.7–2)
LYMPHOCYTES # BLD AUTO: 2.94 K/UL — SIGNIFICANT CHANGE UP (ref 1.2–3.4)
LYMPHOCYTES # BLD AUTO: 27 % — SIGNIFICANT CHANGE UP (ref 20.5–51.1)
MAGNESIUM SERPL-MCNC: 2 MG/DL — SIGNIFICANT CHANGE UP (ref 1.8–2.4)
MCHC RBC-ENTMCNC: 22.6 PG — LOW (ref 27–31)
MCHC RBC-ENTMCNC: 22.9 PG — LOW (ref 27–31)
MCHC RBC-ENTMCNC: 30.8 G/DL — LOW (ref 32–37)
MCHC RBC-ENTMCNC: 31.5 G/DL — LOW (ref 32–37)
MCV RBC AUTO: 72.8 FL — LOW (ref 81–99)
MCV RBC AUTO: 73.5 FL — LOW (ref 81–99)
MONOCYTES # BLD AUTO: 1.11 K/UL — HIGH (ref 0.1–0.6)
MONOCYTES NFR BLD AUTO: 10.2 % — HIGH (ref 1.7–9.3)
MRSA PCR RESULT.: NEGATIVE — SIGNIFICANT CHANGE UP
NEUTROPHILS # BLD AUTO: 6.38 K/UL — SIGNIFICANT CHANGE UP (ref 1.4–6.5)
NEUTROPHILS NFR BLD AUTO: 58.5 % — SIGNIFICANT CHANGE UP (ref 42.2–75.2)
NRBC # BLD: 0 /100 WBCS — SIGNIFICANT CHANGE UP (ref 0–0)
NRBC # BLD: 0 /100 WBCS — SIGNIFICANT CHANGE UP (ref 0–0)
PLATELET # BLD AUTO: 443 K/UL — HIGH (ref 130–400)
PLATELET # BLD AUTO: 450 K/UL — HIGH (ref 130–400)
PMV BLD: 9.6 FL — SIGNIFICANT CHANGE UP (ref 7.4–10.4)
PMV BLD: 9.8 FL — SIGNIFICANT CHANGE UP (ref 7.4–10.4)
POTASSIUM SERPL-MCNC: 4.4 MMOL/L — SIGNIFICANT CHANGE UP (ref 3.5–5)
POTASSIUM SERPL-SCNC: 4.4 MMOL/L — SIGNIFICANT CHANGE UP (ref 3.5–5)
PROCALCITONIN SERPL-MCNC: 0.08 NG/ML — SIGNIFICANT CHANGE UP (ref 0.02–0.1)
PROT SERPL-MCNC: 6.9 G/DL — SIGNIFICANT CHANGE UP (ref 6–8)
PROTHROM AB SERPL-ACNC: 15.5 SEC — HIGH (ref 9.95–12.87)
RBC # BLD: 4.42 M/UL — SIGNIFICANT CHANGE UP (ref 4.2–5.4)
RBC # BLD: 4.45 M/UL — SIGNIFICANT CHANGE UP (ref 4.2–5.4)
RBC # FLD: 14.6 % — HIGH (ref 11.5–14.5)
RBC # FLD: 14.7 % — HIGH (ref 11.5–14.5)
SODIUM SERPL-SCNC: 128 MMOL/L — LOW (ref 135–146)
TIBC SERPL-MCNC: 186 UG/DL — LOW (ref 220–430)
UIBC SERPL-MCNC: 171 UG/DL — SIGNIFICANT CHANGE UP (ref 110–370)
WBC # BLD: 10.9 K/UL — HIGH (ref 4.8–10.8)
WBC # BLD: 13.43 K/UL — HIGH (ref 4.8–10.8)
WBC # FLD AUTO: 10.9 K/UL — HIGH (ref 4.8–10.8)
WBC # FLD AUTO: 13.43 K/UL — HIGH (ref 4.8–10.8)

## 2024-09-23 PROCEDURE — 99254 IP/OBS CNSLTJ NEW/EST MOD 60: CPT

## 2024-09-23 PROCEDURE — 99233 SBSQ HOSP IP/OBS HIGH 50: CPT

## 2024-09-23 RX ORDER — PIPERACILLIN SODIUM AND TAZOBACTAM SODIUM 12; 1.5 G/60ML; G/60ML
3.38 INJECTION, POWDER, LYOPHILIZED, FOR SOLUTION INTRAVENOUS ONCE
Refills: 0 | Status: COMPLETED | OUTPATIENT
Start: 2024-09-23 | End: 2024-09-23

## 2024-09-23 RX ORDER — VANCOMYCIN HCL-SODIUM CHLORIDE IV SOLN 1.5 GM/250ML-0.9% 1.5-0.9/25 GM/ML-%
1000 SOLUTION INTRAVENOUS ONCE
Refills: 0 | Status: COMPLETED | OUTPATIENT
Start: 2024-09-23 | End: 2024-09-23

## 2024-09-23 RX ORDER — VANCOMYCIN HCL-SODIUM CHLORIDE IV SOLN 1.5 GM/250ML-0.9% 1.5-0.9/25 GM/ML-%
1000 SOLUTION INTRAVENOUS EVERY 12 HOURS
Refills: 0 | Status: DISCONTINUED | OUTPATIENT
Start: 2024-09-23 | End: 2024-09-23

## 2024-09-23 RX ORDER — SODIUM CHLORIDE IRRIG SOLUTION 0.9 %
750 SOLUTION, IRRIGATION IRRIGATION ONCE
Refills: 0 | Status: COMPLETED | OUTPATIENT
Start: 2024-09-23 | End: 2024-09-23

## 2024-09-23 RX ORDER — AZITHROMYCIN 250 MG/1
500 TABLET, FILM COATED ORAL DAILY
Refills: 0 | Status: DISCONTINUED | OUTPATIENT
Start: 2024-09-23 | End: 2024-09-23

## 2024-09-23 RX ORDER — FLUOXETINE HCL 20 MG
10 CAPSULE ORAL DAILY
Refills: 0 | Status: DISCONTINUED | OUTPATIENT
Start: 2024-09-23 | End: 2024-09-27

## 2024-09-23 RX ORDER — FLUOXETINE HCL 20 MG
1 CAPSULE ORAL
Refills: 0 | DISCHARGE

## 2024-09-23 RX ORDER — VANCOMYCIN HCL-SODIUM CHLORIDE IV SOLN 1.5 GM/250ML-0.9% 1.5-0.9/25 GM/ML-%
SOLUTION INTRAVENOUS
Refills: 0 | Status: DISCONTINUED | OUTPATIENT
Start: 2024-09-23 | End: 2024-09-23

## 2024-09-23 RX ORDER — PIPERACILLIN SODIUM AND TAZOBACTAM SODIUM 12; 1.5 G/60ML; G/60ML
3.38 INJECTION, POWDER, LYOPHILIZED, FOR SOLUTION INTRAVENOUS EVERY 8 HOURS
Refills: 0 | Status: DISCONTINUED | OUTPATIENT
Start: 2024-09-23 | End: 2024-09-26

## 2024-09-23 RX ADMIN — Medication 10 MILLIGRAM(S): at 14:24

## 2024-09-23 RX ADMIN — Medication 5500 UNIT(S): at 15:52

## 2024-09-23 RX ADMIN — Medication 1000 MILLILITER(S): at 03:10

## 2024-09-23 RX ADMIN — VANCOMYCIN HCL-SODIUM CHLORIDE IV SOLN 1.5 GM/250ML-0.9% 250 MILLIGRAM(S): 1.5-0.9/25 SOLUTION at 03:56

## 2024-09-23 RX ADMIN — PIPERACILLIN SODIUM AND TAZOBACTAM SODIUM 25 GRAM(S): 12; 1.5 INJECTION, POWDER, LYOPHILIZED, FOR SOLUTION INTRAVENOUS at 07:29

## 2024-09-23 RX ADMIN — Medication 1200 UNIT(S)/HR: at 05:19

## 2024-09-23 RX ADMIN — Medication 1500 UNIT(S)/HR: at 15:43

## 2024-09-23 RX ADMIN — PIPERACILLIN SODIUM AND TAZOBACTAM SODIUM 25 GRAM(S): 12; 1.5 INJECTION, POWDER, LYOPHILIZED, FOR SOLUTION INTRAVENOUS at 22:29

## 2024-09-23 RX ADMIN — AZITHROMYCIN 500 MILLIGRAM(S): 250 TABLET, FILM COATED ORAL at 14:23

## 2024-09-23 RX ADMIN — PIPERACILLIN SODIUM AND TAZOBACTAM SODIUM 25 GRAM(S): 12; 1.5 INJECTION, POWDER, LYOPHILIZED, FOR SOLUTION INTRAVENOUS at 14:23

## 2024-09-23 RX ADMIN — PIPERACILLIN SODIUM AND TAZOBACTAM SODIUM 200 GRAM(S): 12; 1.5 INJECTION, POWDER, LYOPHILIZED, FOR SOLUTION INTRAVENOUS at 03:56

## 2024-09-23 RX ADMIN — AZITHROMYCIN 500 MILLIGRAM(S): 250 TABLET, FILM COATED ORAL at 03:49

## 2024-09-23 NOTE — PROGRESS NOTE ADULT - SUBJECTIVE AND OBJECTIVE BOX
Progress Note:  Provider Speciality                            Hospitalist      LUBA RODRIGUEZ [id number] 44y Female     CHIEF PRESENTING COMPLAINT:  Patient is a 44y old  Female who presents with a chief complaint of -Sepsis likely secondary to pneumonia (23 Sep 2024 12:18)        SUBJECTIVE:  Patient was seen and examined at bedside. Reports cough intermittent, frothy sputunm  No significant overnight events reported.     HISTORY OF PRESENTING ILLNESS:  HPI:  Patient is a 45 year old F with PMHx of PE previously on eliquis, self weaned, neurofibromatosis, depression, and migraines who presented to the ED on 9/23; she is complaining of cough, clear frothy sputum, and low grade temp x1d. She denies any other complaints.     Vitals in ED: T 100, , /59, RR 18, SpO2 98% on RA  Labs: WBC 17k, Hgb 11.3 (around baseline ~11)  Imaging:  CTA Chest:  - Pulmonary embolus within the distal left main pulmonary artery. No CTA evidence of right heart strain.  - Complex areas of bronchiectasis and cavitary lesions with superimposed consolidations within the left upper and lower lung fields.  - Additional consolidative opacities within the right upper and lower lobes    Given 1L LR bolus in ED and started on heparin drip, admitted to telemetry for management of PE + sepsis likely 2/2 pneumonia.     (23 Sep 2024 01:02)        REVIEW OF SYSTEMS:  Patient denies  headache, fever, chills. Denies chest pain, shortness of breath, palpitation. Denies nausea, vomiting, abdominal pain or diarrhoea, Denies dysuria.   At least 10 systems were reviewed in ROS. All systems reviewed  are within normal limits except for the complaints as described in Subjective.    PAST MEDICAL & SURGICAL HISTORY:  PAST MEDICAL & SURGICAL HISTORY:  Pulmonary embolism      Migraine      No significant past surgical history              VITAL SIGNS:  Vital Signs Last 24 Hrs  T(C): 37.2 (23 Sep 2024 05:57), Max: 37.8 (22 Sep 2024 16:48)  T(F): 98.9 (23 Sep 2024 05:57), Max: 100 (22 Sep 2024 16:48)  HR: 97 (23 Sep 2024 05:57) (97 - 128)  BP: 111/56 (23 Sep 2024 05:57) (101/59 - 122/68)  BP(mean): --  RR: 18 (23 Sep 2024 05:57) (18 - 26)  SpO2: 97% (23 Sep 2024 05:57) (97% - 100%)    Parameters below as of 23 Sep 2024 05:57  Patient On (Oxygen Delivery Method): room air              PHYSICAL EXAMINATION:  Not in acute distress  General: No icterus  HEENT:   no JVD.  Heart: S1+S2 audible  Lungs: bilateral  moderate air entry, no wheezing, no crepitations.  Abdomen: Soft, non-tender, non-distended , no  rigidity or guarding.  CNS: Awake alert, CN  grossly intact.  Extremities:  No edema            CONSULTS:  Consultant(s) Notes Reviewed by me.   Care Discussed with Consultants/Other Providers where required.    All the images and labs were reviewed today        MEDICATIONS:  MEDICATIONS  (STANDING):  azithromycin   Tablet 500 milliGRAM(s) Oral daily  FLUoxetine 10 milliGRAM(s) Oral daily  heparin  Infusion.  Unit(s)/Hr (12 mL/Hr) IV Continuous <Continuous>  piperacillin/tazobactam IVPB.. 3.375 Gram(s) IV Intermittent every 8 hours    MEDICATIONS  (PRN):  heparin   Injectable 2500 Unit(s) IV Push every 6 hours PRN For aPTT between 40 - 57  heparin   Injectable 5500 Unit(s) IV Push every 6 hours PRN For aPTT less than 40

## 2024-09-23 NOTE — CONSULT NOTE ADULT - ASSESSMENT
ASSESSMENT  Patient is a 45 year old F with PMHx of PE previously on eliquis, self weaned, neurofibromatosis, depression, and migraines who presented to the ED on 9/23    IMPRESSION  #Cavitary Lesions, Chronic -- possible MAC infection   - Bronch 8/2018 - bacterial, fungal, AFB Cx negative   - Followed by [facility]m outpatient -- Sputum Cx 5/31 MAC, Candida lusitaniae --> recommended for ID evaluation as outpatient, has appointment in Oct 2024    #Superimposed bacterial pneumonia   - CT Angio Chest PE Protocol w/ IV Cont (09.22.24 @ 20:16): Pulmonary embolus within the distal left main pulmonary artery. No CTA  evidence of right heart strain.  Complex areas of bronchiectasis and cavitary lesions with superimposed  consolidations within the left upper and lower lung fields. Additional consolidative opacities within the right upper and lower lobes  - WBC Count: 17.45 K/uL (09.22.24 @ 17:25)  - MRSA nares negative     #Neurofibromatosis   #Depression    #Obesity BMI (kg/m2): 25  #Abx allergy: No Known Allergies    RECOMMENDATIONS  - Reviewed Outpatient pulmonary notes -- AFB Cx x 1 with MAC  - follow-up AFB sputum Cx collected here  - please repeat AFB sputum here x 2  -- would need 2 positive Cx to establish diagnosis   - hold on azithromycin to avoid monotherapy and potential resistance   - check sputum cx   - continue zosyn 3.375 mg q 8 hours for now   - check urine strep and urine leginoella   - trend WBC     Please call or message on [employer] Teams if with any questions.  Spectra [phone]

## 2024-09-23 NOTE — CONSULT NOTE ADULT - ATTENDING COMMENTS
Please See consult noted from same date.     Please call or message on [employer] Teams if with any questions.  Spectra [phone]

## 2024-09-23 NOTE — ED ADULT NURSE REASSESSMENT NOTE - NS ED NURSE REASSESS COMMENT FT1
Pt is A&Ox4 and resting comfortably in bed. Pt has heparin drip running at 12ml/hr and has IV zosyn running. Vital signs stable.

## 2024-09-23 NOTE — CONSULT NOTE ADULT - ASSESSMENT
Impression   Hx of PE unprovoked 2016 s/p treatment  now recurring   Hx of Cavitations found on OP imaging after PNA, lost to follow up in 2007/8  Saw Dr. Villa (per patient) 2021 lost to follow up during covid  Started seeing Dr. Abarca in 2024 May, found with yair and MAC in sputum and was referred to ID     Plan   -May need Life long AC  -consider working up inflammatory lung dz   ESR, CRP, RF, anti-ccp, anti-centromere, CK, aldolase, myositis panel, MyoMarker Panel 3 (includes anti-MDA5 antibodies; needs a paper requisition), ANCA, SCL-70 ab, RAMOS, DsDNA, anti-RO, anti-LA, IgG4, RNP (order as "extractable nuclear antigens"), Beulah-1 antibodies, and HIV antibodies.  -c/w Abx   -F/u with ID and Dr. Abarca    Impression     Hx of PE unprovoked 2016 s/p treatment  now recurring   Hx of Cavitations and bronchiectasis found on OP imaging after PNA, lost to follow up in 2007/8  Saw Dr. Villa (per patient) 2021 lost to follow up during covid  Started seeing Dr. Abarca in 5/2024, found with candida and MAC in sputum and was referred to ID and follow up with pulm in october   HX of asthma     Plan     -May need Life long AC  -consider working up inflammatory lung dz: if not already sent, please send ESR, CRP, RF, anti-ccp, anti-centromere, CK, aldolase, myositis panel, MyoMarker Panel 3 (includes anti-MDA5 antibodies; needs a paper requisition), ANCA, SCL-70 ab, DsDNA, anti-RO, anti-LA, IgG4, RNP (order as "extractable nuclear antigens"), Beulah-1 antibodies, and HIV antibodies.  -c/w Abx   -F/u with ID and Dr. Abarca   -c/w ome  as needed albuterol  Impression     Hx of PE unprovoked 2016 s/p treatment  now recurring   Hx of Cavitations and bronchiectasis found on OP imaging after PNA, lost to follow up in 2017/18  Saw Dr. Villa (per patient) 2021 lost to follow up during covid  Started seeing Dr. Abarca in 5/2024, found with candida and MAC in sputum and was referred to ID and follow up with pulm in october   HX of asthma     Plan     -will Life long AC  - ESR. RAMOS, C ANCA, P ANCS  - sputum for AFB  -c/w Abx   -F/u with ID and Dr. Abarca   -c/w ome  as needed albuterol

## 2024-09-23 NOTE — CONSULT NOTE ADULT - ATTENDING COMMENTS
Events noted, presented for worsening cough. SOB, low grade fever, Chest ct noted, PE worsening, prior CT 2017/ 2018/ 2021 reviewed, prior bronch noted, recent AFB reviewed, MAC, possible bacterial superinfection  sputum for AFB, gram stain/ cx, ESR, C ANCA, P ANCA, ABX Events noted, presented for worsening cough. SOB, low grade fever, Chest ct noted, PE worsening, prior CT 2017/ 2018/ 2021 reviewed, prior bronch noted, recent AFB reviewed, MAC, possible bacterial superinfection, PE  sputum for AFB, gram stain/ cx, ESR, C ANCA, P ANCA, ABX, full AC

## 2024-09-23 NOTE — H&P ADULT - HISTORY OF PRESENT ILLNESS
Patient is a 45 year old F with PMHx of PE previously on eliquis, self weaned, who presented to the ED on 9/23; she is complaining of cough, clear frothy sputum, and low grade temp x1d. She denies any other complaints.     Vitals in ED: T 100, , /59, RR 18, SpO2 98% on RA     Patient is a 45 year old F with PMHx of PE previously on eliquis, self weaned, who presented to the ED on 9/23; she is complaining of cough, clear frothy sputum, and low grade temp x1d. She denies any other complaints.     Vitals in ED: T 100, , /59, RR 18, SpO2 98% on RA  Labs:      Patient is a 45 year old F with PMHx of PE previously on eliquis, self weaned, who presented to the ED on 9/23; she is complaining of cough, clear frothy sputum, and low grade temp x1d. She denies any other complaints.     Vitals in ED: T 100, , /59, RR 18, SpO2 98% on RA  Labs: WBC 17k     Patient is a 45 year old F with PMHx of PE previously on eliquis, self weaned, who presented to the ED on 9/23; she is complaining of cough, clear frothy sputum, and low grade temp x1d. She denies any other complaints.     Vitals in ED: T 100, , /59, RR 18, SpO2 98% on RA  Labs: WBC 17k, Hgb 11.3 (around baseline ~11),      Patient is a 45 year old F with PMHx of PE previously on eliquis, self weaned, who presented to the ED on 9/23; she is complaining of cough, clear frothy sputum, and low grade temp x1d. She denies any other complaints.     Vitals in ED: T 100, , /59, RR 18, SpO2 98% on RA  Labs: WBC 17k, Hgb 11.3 (around baseline ~11),   Imaging:  CTA Chest:  - Pulmonary embolus within the distal left main pulmonary artery. No CTA evidence of right heart strain.  - Complex areas of bronchiectasis and cavitary lesions with superimposed consolidations within the left upper and lower lung fields.  - Additional consolidative opacities within the right upper and lower lobes    Given 1L LR bolus in ED and started on heparin drip, admitted to telemetry.      Patient is a 45 year old F with PMHx of PE previously on eliquis, self weaned, neurofibromatosis, and migraines who presented to the ED on 9/23; she is complaining of cough, clear frothy sputum, and low grade temp x1d. She denies any other complaints.     Vitals in ED: T 100, , /59, RR 18, SpO2 98% on RA  Labs: WBC 17k, Hgb 11.3 (around baseline ~11)  Imaging:  CTA Chest:  - Pulmonary embolus within the distal left main pulmonary artery. No CTA evidence of right heart strain.  - Complex areas of bronchiectasis and cavitary lesions with superimposed consolidations within the left upper and lower lung fields.  - Additional consolidative opacities within the right upper and lower lobes    Given 1L LR bolus in ED and started on heparin drip, admitted to telemetry.      Patient is a 45 year old F with PMHx of PE previously on eliquis, self weaned, neurofibromatosis, depression, and migraines who presented to the ED on 9/23; she is complaining of cough, clear frothy sputum, and low grade temp x1d. She denies any other complaints.     Vitals in ED: T 100, , /59, RR 18, SpO2 98% on RA  Labs: WBC 17k, Hgb 11.3 (around baseline ~11)  Imaging:  CTA Chest:  - Pulmonary embolus within the distal left main pulmonary artery. No CTA evidence of right heart strain.  - Complex areas of bronchiectasis and cavitary lesions with superimposed consolidations within the left upper and lower lung fields.  - Additional consolidative opacities within the right upper and lower lobes    Given 1L LR bolus in ED and started on heparin drip, admitted to telemetry.      Patient is a 45 year old F with PMHx of PE previously on eliquis, self weaned, neurofibromatosis, depression, and migraines who presented to the ED on 9/23; she is complaining of cough, clear frothy sputum, and low grade temp x1d. She denies any other complaints.     Vitals in ED: T 100, , /59, RR 18, SpO2 98% on RA  Labs: WBC 17k, Hgb 11.3 (around baseline ~11)  Imaging:  CTA Chest:  - Pulmonary embolus within the distal left main pulmonary artery. No CTA evidence of right heart strain.  - Complex areas of bronchiectasis and cavitary lesions with superimposed consolidations within the left upper and lower lung fields.  - Additional consolidative opacities within the right upper and lower lobes    Given 1L LR bolus in ED and started on heparin drip, admitted to telemetry for management of PE + sepsis likely 2/2 pneumonia.

## 2024-09-23 NOTE — H&P ADULT - ATTENDING COMMENTS
45 year old F with PMHx of PE previously on eliquis, self weaned, neurofibromatosis, and migraines who presented to the ED on 9/23; she is complaining of cough, clear frothy sputum, and low grade temp x1d. She denies any other complaints. Admitted to telemetry.     Agree  with assessment  except for changes below.   Vital Signs Last 24 Hrs  T(C): 37.8 (22 Sep 2024 16:48), Max: 37.8 (22 Sep 2024 16:48)  T(F): 100 (22 Sep 2024 16:48), Max: 100 (22 Sep 2024 16:48)  HR: 100 (22 Sep 2024 22:24) (100 - 128)  BP: 101/59 (22 Sep 2024 22:24) (101/59 - 122/68)  BP(mean): --  RR: 18 (22 Sep 2024 22:24) (18 - 26)  SpO2: 98% (22 Sep 2024 22:24) (98% - 100%)    Parameters below as of 22 Sep 2024 22:24  Patient On (Oxygen Delivery Method): room air      IMPRESSION 45 year old F with PMHx of PE previously on eliquis, self weaned, neurofibromatosis, and migraines who presented to the ED on 9/23; she is complaining of cough, clear frothy sputum, and low grade temp x1d. She denies any other complaints. Admitted to telemetry.     Agree  with assessment  except for changes below.   Vital Signs Last 24 Hrs  T(C): 37.8 (22 Sep 2024 16:48), Max: 37.8 (22 Sep 2024 16:48)  T(F): 100 (22 Sep 2024 16:48), Max: 100 (22 Sep 2024 16:48)  HR: 100 (22 Sep 2024 22:24) (100 - 128)  BP: 101/59 (22 Sep 2024 22:24) (101/59 - 122/68)  BP(mean): --  RR: 18 (22 Sep 2024 22:24) (18 - 26)  SpO2: 98% (22 Sep 2024 22:24) (98% - 100%)    Parameters below as of 22 Sep 2024 22:24  Patient On (Oxygen Delivery Method): room air    CTA: Pulmonary embolus within the distal left main pulmonary artery. No CTA   evidence of right heart strain.  Complex areas of bronchiectasis and cavitary lesions with superimposed   consolidations within the left upper and lower lung fields.  Additional consolidative opacities within the right upper and lower lobes      IMPRESSION  Pulmonary  Embolus   Suspected  failure of Eliquis  in the setting of SIRS  Hx  PEs previously on Eliquis  Trop Neg, Neg Heart Strain, Hemodynamically Stable   Start on Hep Drip RN Driven protocol PTT goal  60-90  Hold if  Signs  of Bleeding  f/u  hypercoagulable w/u : protein C + S, JAK2, Beta-2-glycoprotein, factor V, anticardiolipin    heme/onc consult after hypercoagulable workup is obtained    Septic /SIRS  on admission secondary to possible Pneumonia  Hx of cavitary lesions  - Ct as above   - given 1L LR bolus in ED, gave additional 750 cc bolus,   -  maintenance fluids  - f/u blood culture  - f/u sputum culture  - urine strep + legionella  - MRSA PCR  - Procal  - No need  Acid-Fast sputum x3 Prior (QUANTERFERON NEG)  -  DC isolation precautions for TB Prior (QUANTERFERON NEG)  - Vanc, Zosyn, and Azithromycin and consult ID  -  f/u pulm consult for 45 year old F with PMHx of PE previously on eliquis, self weaned, neurofibromatosis, and migraines who presented to the ED on 9/23; she is complaining of cough, clear frothy sputum, and low grade temp x1d. She denies any other complaints. Admitted to telemetry.     Agree  with assessment  except for changes below.   Vital Signs Last 24 Hrs  T(C): 37.8 (22 Sep 2024 16:48), Max: 37.8 (22 Sep 2024 16:48)  T(F): 100 (22 Sep 2024 16:48), Max: 100 (22 Sep 2024 16:48)  HR: 100 (22 Sep 2024 22:24) (100 - 128)  BP: 101/59 (22 Sep 2024 22:24) (101/59 - 122/68)  BP(mean): --  RR: 18 (22 Sep 2024 22:24) (18 - 26)  SpO2: 98% (22 Sep 2024 22:24) (98% - 100%)    Parameters below as of 22 Sep 2024 22:24  Patient On (Oxygen Delivery Method): room air    CTA: Pulmonary embolus within the distal left main pulmonary artery. No CTA   evidence of right heart strain.  Complex areas of bronchiectasis and cavitary lesions with superimposed   consolidations within the left upper and lower lung fields.  Additional consolidative opacities within the right upper and lower lobes      PHYSICAL EXAM  GENERAL: NAD,  HEAD:  NCAT, EOMI, MM  NECK: Supple, Nontender  NERVOUS SYSTEM:  AAOx3, NFD  CHEST/LUNG: +bs b/l, No wheezing   HEART: +s1s2 RRR  ABDOMEN: soft, NT/ND  EXTREMITIES:  pp, no edema  SKIN: age related skin changes     IMPRESSION  Pulmonary  Embolus     in the setting of SIRS/Sepsis   Hx  PEs previously on Eliquis no longer on AC  Provoked ?   Trop Neg, Neg Heart Strain, Hemodynamically Stable   Start on Hep Drip RN Driven protocol PTT goal  60-90  Hold if  Signs  of Bleeding  f/u  hypercoagulable w/u : protein C + S, JAK2, Beta-2-glycoprotein, factor V, anticardiolipin    heme/onc consult after hypercoagulable workup is obtained    Septic /SIRS  on admission secondary to possible Pneumonia  Hx of cavitary lesions  - Ct as above   - given 1L LR bolus in ED, gave additional 750 cc bolus,   -  maintenance fluids  - f/u blood culture  - f/u sputum culture  - urine strep + legionella  - MRSA PCR  - Procal  - No need  Acid-Fast sputum x3 Prior (QUANTERFERON NEG)  -  DC isolation precautions for TB Prior (QUANTERFERON NEG)  - Vanc, Zosyn, and Azithromycin and consult ID  -  f/u pulm consult for    Seen on 09/22 45 year old F with PMHx of PE previously on eliquis, self weaned, neurofibromatosis, and migraines who presented to the ED on 9/23; she is complaining of cough, clear frothy sputum, and low grade temp x1d. She denies any other complaints. Admitted to telemetry.     Agree  with assessment  except for changes below.   Vital Signs Last 24 Hrs  T(C): 37.8 (22 Sep 2024 16:48), Max: 37.8 (22 Sep 2024 16:48)  T(F): 100 (22 Sep 2024 16:48), Max: 100 (22 Sep 2024 16:48)  HR: 100 (22 Sep 2024 22:24) (100 - 128)  BP: 101/59 (22 Sep 2024 22:24) (101/59 - 122/68)  BP(mean): --  RR: 18 (22 Sep 2024 22:24) (18 - 26)  SpO2: 98% (22 Sep 2024 22:24) (98% - 100%)    Parameters below as of 22 Sep 2024 22:24  Patient On (Oxygen Delivery Method): room air    CTA: Pulmonary embolus within the distal left main pulmonary artery. No CTA   evidence of right heart strain.  Complex areas of bronchiectasis and cavitary lesions with superimposed   consolidations within the left upper and lower lung fields.  Additional consolidative opacities within the right upper and lower lobes      PHYSICAL EXAM  GENERAL: NAD,  HEAD:  NCAT, EOMI, MM  NECK: Supple, Nontender  NERVOUS SYSTEM:  AAOx3, NFD  CHEST/LUNG: +bs b/l, No wheezing   HEART: +s1s2 RRR  ABDOMEN: soft, NT/ND  EXTREMITIES:  pp, no edema  SKIN: age related skin changes     IMPRESSION  Pulmonary  Embolus     in the setting of SIRS/Sepsis   Hx  PEs previously on Eliquis no longer on AC  Provoked ?   Trop Neg, Neg Heart Strain, Hemodynamically Stable   Start on Hep Drip RN Driven protocol PTT goal  60-90  Hold if  Signs  of Bleeding  f/u  hypercoagulable w/u : protein C + S, JAK2, Beta-2-glycoprotein, factor V, anticardiolipin    heme/onc consult after hypercoagulable workup is obtained    Septic /SIRS  on admission secondary to possible Pneumonia  Hx of cavitary lesions  - Ct as above   - given 1L LR bolus in ED, gave additional 750 cc bolus,   -  maintenance fluids  - f/u blood culture  - f/u sputum culture  - urine strep + legionella  - MRSA PCR  - Procal  - f/u Acid-Fast sputum x3 Prior   -  c/w isolation precautions for TB Prior  - Vanc, Zosyn, and Azithromycin and consult ID  -  f/u pulm consult for    Seen on 09/22

## 2024-09-23 NOTE — PROGRESS NOTE ADULT - SUBJECTIVE AND OBJECTIVE BOX
LUBA RODRIGUEZ  44y, Female  Allergy: No Known Allergies    CHIEF COMPLAINT:   HPI:  Patient is a 45 year old F with PMHx of PE previously on eliquis, self weaned, neurofibromatosis, depression, and migraines who presented to the ED on 9/23 complaining of cough, clear frothy sputum, and low grade temp x1d. Patient was admitted for sepsis 2/ pneumonia and started on heparin. Patient is on isolation precautions due to cavitary lesions on CTA chest. Today, patient is talking comfortably and endorses improvement in cough. Denies shortness of breath, chest pain, productive cough, headache, abdominal pain, pain or swelling in lower extremities.   Imaging:  CTA Chest:  - Pulmonary embolus within the distal left main pulmonary artery. No CTA evidence of right heart strain.  - Complex areas of bronchiectasis and cavitary lesions with superimposed consolidations within the left upper and lower lung fields.  - Additional consolidative opacities within the right upper and lower lobes        Infectious Diseases History:  Old Micro Data/Cultures:     FAMILY HISTORY:    PAST MEDICAL & SURGICAL HISTORY:  Pulmonary embolism      Migraine      No significant past surgical history          SOCIAL HISTORY  Social History:      Recent Travel:  Other Exposures:     ROS  General: Denies rigors, nightsweats  HEENT: Denies headache, rhinorrhea, sore throat, eye pain  CV: Denies CP, palpitations  PULM: Denies wheezing, hemoptysis  GI: Denies hematemesis, hematochezia, melena  : Denies discharge, hematuria  MSK: Denies arthralgias, myalgias  SKIN: Denies rash, lesions  NEURO: Denies paresthesias, weakness  PSYCH: Denies depression, anxiety    VITALS:  T(F): 98.9, Max: 100 (09-22-24 @ 16:48)  HR: 97  BP: 111/56  RR: 18Vital Signs Last 24 Hrs  T(C): 37.2 (23 Sep 2024 05:57), Max: 37.8 (22 Sep 2024 16:48)  T(F): 98.9 (23 Sep 2024 05:57), Max: 100 (22 Sep 2024 16:48)  HR: 97 (23 Sep 2024 05:57) (97 - 128)  BP: 111/56 (23 Sep 2024 05:57) (101/59 - 122/68)  BP(mean): --  RR: 18 (23 Sep 2024 05:57) (18 - 26)  SpO2: 97% (23 Sep 2024 05:57) (97% - 100%)    Parameters below as of 23 Sep 2024 05:57  Patient On (Oxygen Delivery Method): room air        PHYSICAL EXAM:  Gen: NAD, resting in bed  HEENT: Normocephalic, atraumatic  Neck: supple, no lymphadenopathy  CV: Regular rate & regular rhythm  Lungs: CTAB  Abdomen: Soft, BS present  Ext: Warm, well perfused  Neuro: non focal, awake  Skin: no rash, no lesions  Lines: no phlebitis    TESTS & MEASUREMENTS:                        10.2   13.43 )-----------( 443      ( 23 Sep 2024 04:03 )             32.4     09-22    140  |  99  |  7[L]  ----------------------------<  81  4.2   |  20  |  0.7    Ca    9.4      22 Sep 2024 17:25    TPro  8.3[H]  /  Alb  4.0  /  TBili  0.4  /  DBili  x   /  AST  12  /  ALT  6   /  AlkPhos  74  09-22      LIVER FUNCTIONS - ( 22 Sep 2024 17:25 )  Alb: 4.0 g/dL / Pro: 8.3 g/dL / ALK PHOS: 74 U/L / ALT: 6 U/L / AST: 12 U/L / GGT: x           Urinalysis Basic - ( 22 Sep 2024 17:25 )    Color: x / Appearance: x / SG: x / pH: x  Gluc: 81 mg/dL / Ketone: x  / Bili: x / Urobili: x   Blood: x / Protein: x / Nitrite: x   Leuk Esterase: x / RBC: x / WBC x   Sq Epi: x / Non Sq Epi: x / Bacteria: x          Lactate, Blood: 1.0 mmol/L (09-22-24 @ 20:26)      INFECTIOUS DISEASES TESTING      RADIOLOGY & ADDITIONAL TESTS:  I have personally reviewed the last available Chest xray  CXR  Xray Chest 1 View- PORTABLE-Urgent:   ACC: [id number] EXAM:  XR CHEST PORTABLE URGENT 1V   ORDERED BY: YOUNG MCFARLAND     PROCEDURE DATE:  09/22/2024          INTERPRETATION:  Clinical History / Reason for exam: Shortness of breath    Comparison : Chest radiograph correlation with chest CT 9/22/2024.    Technique/Positioning: Single AP chest radiograph.    Findings:    Support devices: None.    Cardiac/mediastinum/hilum: Partially obscured    Lung parenchyma/Pleura: Left upper lobe cavitary lesion is not well   visualized on this exam. Left lower lobe opacities and cavitary lesions   are similar compared to recent CT. Elevation of the left hemidiaphragm.   The right lung appears clear. No pneumothorax.    Skeleton/soft tissues: Scoliotic curvature of the spine and degenerative   changes.    Impression:    Similar appearance of left lung opacities and cavitary lesions compared   with concurrent CT chest. Elevation of the left hemidiaphragm.    --- End of Report ---            LYDIA CHAU MD; Attending Radiologist  This document has been electronically signed. Sep 23 2024  9:34AM (09-22-24 @ 19:22)      CT  CT Angio Chest PE Protocol w/ IV Cont:   ACC: [id number] EXAM:  CT ANGIO CHEST PULM [facility]   ORDERED BY: YOUNG MCFARLAND     PROCEDURE DATE:  09/22/2024          INTERPRETATION:  CLINICAL INFORMATION: Shortness of breath    COMPARISON: CT CHEST 5/7/2024.    CONTRAST/COMPLICATIONS:  IV Contrast: Omnipaque 350  65 cc administered   35 cc discarded  Oral Contrast: NONE  Complications: None reported at time of study completion    PROCEDURE:  CT Angiography of the Chest.  Sagittal and coronal reformats were performed as well as 3D (MIP)   reconstructions.    FINDINGS:    LUNGS AND LARGE AIRWAYS: Scattered complex areas of bronchiectasis with   superimposed cavitary lesions and consolidations within the left lung.   Additional cavitary lesions and consolidations within the right upper and   right lower lobe.  PLEURA: Trace left pleural effusion  VESSELS: Pulmonary embolus within the distal left main pulmonary artery.  HEART: Heart size is normal. No pericardial effusion.  MEDIASTINUM AND ANA: Multiple enlarged prevascular, paratracheal and   subcarinal lymph nodes, likely reactive  CHEST WALL AND LOWER NECK: Left lower lobe hypodensity  VISUALIZED UPPER ABDOMEN: Within normal limits.  BONES: Within normal limits.    IMPRESSION:  Pulmonary embolus within the distal left main pulmonary artery. No CTA   evidence of right heart strain.    Complex areas of bronchiectasis and cavitary lesions with superimposed   consolidations within the left upper and lower lung fields.    Additional consolidative opacities within the right upper and lower lobes      Message sent to YOUNG BANUELOS DO [phone]; Attending Emergency Medicine   via secure MS Teams chat on 9/22/2024 8:30 PM with readback.    Callback could not be created given technical issues.    --- End of Report ---            DARLENE MUKHERJEE MD; Attending Radiologist  This document has been electronically signed. Sep 22 2024  8:32PM (09-22-24 @ 20:16)      CARDIOLOGY TESTING  12 Lead ECG:   Ventricular Rate 121 BPM    Atrial Rate 121 BPM    P-R Interval 174 ms    QRS Duration 84 ms    Q-T Interval 306 ms    QTC Calculation(Bazett) 434 ms    P Axis 67 degrees    R Axis -19 degrees    T Axis 67 degrees    Diagnosis Line Sinus tachycardia  Possible Left atrial enlargement  Borderline ECG    Confirmed by Alfredo Miller ([id number]) on 9/22/2024 5:36:59 PM (09-22-24 @ 16:47)      All available historical records have been reviewed    MEDICATIONS  azithromycin   Tablet 500  FLUoxetine 10  heparin  Infusion.   piperacillin/tazobactam IVPB.. 3.375      ANTIBIOTICS:  azithromycin   Tablet 500 milliGRAM(s) Oral daily  piperacillin/tazobactam IVPB.. 3.375 Gram(s) IV Intermittent every 8 hours      All available historical data has been reviewed         LUBA RODRIGUEZ  44y, Female  Allergy: No Known Allergies    CHIEF COMPLAINT:   HPI:  Patient is a 45 year old F with PMHx of PE previously on eliquis, self weaned, neurofibromatosis, depression, and migraines who presented to the ED on 9/23 complaining of cough, clear frothy sputum, and low grade temp x1d. Patient was admitted for sepsis 2/ pneumonia and started on heparin, vancomycin, zosyn, and azithromycin. Patient is on isolation precautions due to cavitary lesions on CTA chest. Today, patient is talking and breathing comfortably endorses improvement in cough. Denies shortness of breath, chest pain, productive cough, headache, abdominal pain, pain or swelling in lower extremities.   Imaging:  CTA Chest:  - Pulmonary embolus within the distal left main pulmonary artery. No CTA evidence of right heart strain.  - Complex areas of bronchiectasis and cavitary lesions with superimposed consolidations within the left upper and lower lung fields.  - Additional consolidative opacities within the right upper and lower lobes                Infectious Diseases History:  Old Micro Data/Cultures:     FAMILY HISTORY:    PAST MEDICAL & SURGICAL HISTORY:  Pulmonary embolism      Migraine      No significant past surgical history          SOCIAL HISTORY  Social History:      Recent Travel:  Other Exposures:     ROS  General: Denies rigors, nightsweats  HEENT: Denies headache, rhinorrhea, sore throat, eye pain  CV: Denies CP, palpitations  PULM: Denies wheezing, hemoptysis  GI: Denies hematemesis, hematochezia, melena  : Denies discharge, hematuria  MSK: Denies arthralgias, myalgias  SKIN: Denies rash, lesions  NEURO: Denies paresthesias, weakness  PSYCH: Denies depression, anxiety    VITALS:  T(F): 98.9, Max: 100 (09-22-24 @ 16:48)  HR: 97  BP: 111/56  RR: 18Vital Signs Last 24 Hrs  T(C): 37.2 (23 Sep 2024 05:57), Max: 37.8 (22 Sep 2024 16:48)  T(F): 98.9 (23 Sep 2024 05:57), Max: 100 (22 Sep 2024 16:48)  HR: 97 (23 Sep 2024 05:57) (97 - 128)  BP: 111/56 (23 Sep 2024 05:57) (101/59 - 122/68)  BP(mean): --  RR: 18 (23 Sep 2024 05:57) (18 - 26)  SpO2: 97% (23 Sep 2024 05:57) (97% - 100%)    Parameters below as of 23 Sep 2024 05:57  Patient On (Oxygen Delivery Method): room air        PHYSICAL EXAM:  Gen: NAD, resting in bed  HEENT: Normocephalic, atraumatic  Neck: supple, no lymphadenopathy  CV: Regular rate & regular rhythm  Lungs: Decreased breath sounds on left lower lobe. No accessory muscles   of respiration use.  Abdomen: Soft, BS present  Ext: Warm, well perfused  Neuro: non focal, awake  Skin: no rash, no lesions  Lines: no phlebitis    TESTS & MEASUREMENTS:                        10.2   13.43 )-----------( 443      ( 23 Sep 2024 04:03 )             32.4     09-22    140  |  99  |  7[L]  ----------------------------<  81  4.2   |  20  |  0.7    Ca    9.4      22 Sep 2024 17:25    TPro  8.3[H]  /  Alb  4.0  /  TBili  0.4  /  DBili  x   /  AST  12  /  ALT  6   /  AlkPhos  74  09-22      LIVER FUNCTIONS - ( 22 Sep 2024 17:25 )  Alb: 4.0 g/dL / Pro: 8.3 g/dL / ALK PHOS: 74 U/L / ALT: 6 U/L / AST: 12 U/L / GGT: x           Urinalysis Basic - ( 22 Sep 2024 17:25 )    Color: x / Appearance: x / SG: x / pH: x  Gluc: 81 mg/dL / Ketone: x  / Bili: x / Urobili: x   Blood: x / Protein: x / Nitrite: x   Leuk Esterase: x / RBC: x / WBC x   Sq Epi: x / Non Sq Epi: x / Bacteria: x          Lactate, Blood: 1.0 mmol/L (09-22-24 @ 20:26)      INFECTIOUS DISEASES TESTING      RADIOLOGY & ADDITIONAL TESTS:  I have personally reviewed the last available Chest xray  CXR  Xray Chest 1 View- PORTABLE-Urgent:   ACC: [id number] EXAM:  XR CHEST PORTABLE URGENT 1V   ORDERED BY: YOUNG MCFARLAND     PROCEDURE DATE:  09/22/2024          INTERPRETATION:  Clinical History / Reason for exam: Shortness of breath    Comparison : Chest radiograph correlation with chest CT 9/22/2024.    Technique/Positioning: Single AP chest radiograph.    Findings:    Support devices: None.    Cardiac/mediastinum/hilum: Partially obscured    Lung parenchyma/Pleura: Left upper lobe cavitary lesion is not well   visualized on this exam. Left lower lobe opacities and cavitary lesions   are similar compared to recent CT. Elevation of the left hemidiaphragm.   The right lung appears clear. No pneumothorax.    Skeleton/soft tissues: Scoliotic curvature of the spine and degenerative   changes.    Impression:    Similar appearance of left lung opacities and cavitary lesions compared   with concurrent CT chest. Elevation of the left hemidiaphragm.    --- End of Report ---            LYDIA CHAU MD; Attending Radiologist  This document has been electronically signed. Sep 23 2024  9:34AM (09-22-24 @ 19:22)      CT  CT Angio Chest PE Protocol w/ IV Cont:   ACC: [id number] [facility]   ORDERED BY: YOUNG MCFARLAND     PROCEDURE DATE:  09/22/2024          INTERPRETATION:  CLINICAL INFORMATION: Shortness of breath    COMPARISON: CT CHEST 5/7/2024.    CONTRAST/COMPLICATIONS:  IV Contrast: Omnipaque 350  65 cc administered   35 cc discarded  Oral Contrast: NONE  Complications: None reported at time of study completion    PROCEDURE:  CT Angiography of the Chest.  Sagittal and coronal reformats were performed as well as 3D (MIP)   reconstructions.    FINDINGS:    LUNGS AND LARGE AIRWAYS: Scattered complex areas of bronchiectasis with   superimposed cavitary lesions and consolidations within the left lung.   Additional cavitary lesions and consolidations within the right upper and   right lower lobe.  PLEURA: Trace left pleural effusion  VESSELS: Pulmonary embolus within the distal left main pulmonary artery.  HEART: Heart size is normal. No pericardial effusion.  MEDIASTINUM AND ANA: Multiple enlarged prevascular, paratracheal and   subcarinal lymph nodes, likely reactive  CHEST WALL AND LOWER NECK: Left lower lobe hypodensity  VISUALIZED UPPER ABDOMEN: Within normal limits.  BONES: Within normal limits.    IMPRESSION:  Pulmonary embolus within the distal left main pulmonary artery. No CTA   evidence of right heart strain.    Complex areas of bronchiectasis and cavitary lesions with superimposed   consolidations within the left upper and lower lung fields.    Additional consolidative opacities within the right upper and lower lobes      Message sent to YOUNG BANUELOS DO [phone]; Attending Emergency Medicine   via secure MS Teams chat on 9/22/2024 8:30 PM with readback.    Callback could not be created given technical issues.    --- End of Report ---            DARLENE MUKHERJEE MD; Attending Radiologist  This document has been electronically signed. Sep 22 2024  8:32PM (09-22-24 @ 20:16)      CARDIOLOGY TESTING  12 Lead ECG:   Ventricular Rate 121 BPM    Atrial Rate 121 BPM    P-R Interval 174 ms    QRS Duration 84 ms    Q-T Interval 306 ms    QTC Calculation(Bazett) 434 ms    P Axis 67 degrees    R Axis -19 degrees    T Axis 67 degrees    Diagnosis Line Sinus tachycardia  Possible Left atrial enlargement  Borderline ECG    Confirmed by Alfredo Miller ([id number]) on 9/22/2024 5:36:59 PM (09-22-24 @ 16:47)      All available historical records have been reviewed    MEDICATIONS  azithromycin   Tablet 500  FLUoxetine 10  heparin  Infusion.   piperacillin/tazobactam IVPB.. 3.375      ANTIBIOTICS:  azithromycin   Tablet 500 milliGRAM(s) Oral daily  piperacillin/tazobactam IVPB.. 3.375 Gram(s) IV Intermittent every 8 hours      All available historical data has been reviewed

## 2024-09-23 NOTE — CONSULT NOTE ADULT - SUBJECTIVE AND OBJECTIVE BOX
Patient is a 44y old  Female who presents with a chief complaint of -Sepsis likely secondary to pneumonia (23 Sep 2024 12:18)      HPI:  Patient is a 45 year old F with PMHx of PE previously on eliquis, self weaned, neurofibromatosis, depression, and migraines who presented to the ED on 9/23; she is complaining of cough, clear frothy sputum, and low grade temp x1d. She denies any other complaints.     Vitals in ED: T 100, , /59, RR 18, SpO2 98% on RA  Labs: WBC 17k, Hgb 11.3 (around baseline ~11)  Imaging:  CTA Chest:  - Pulmonary embolus within the distal left main pulmonary artery. No CTA evidence of right heart strain.  - Complex areas of bronchiectasis and cavitary lesions with superimposed consolidations within the left upper and lower lung fields.  - Additional consolidative opacities within the right upper and lower lobes    Given 1L LR bolus in ED and started on heparin drip, admitted to telemetry for management of PE + sepsis likely 2/2 pneumonia.    Pulm consulted for cavitary lesions on imaging   Patient began seeing pulmonary around 2007-8 she was then lost to follow up  She then began to see Dr. Abarca where the sputum showed rare mac  She was       PAST MEDICAL & SURGICAL HISTORY:  Pulmonary embolism      Migraine      No significant past surgical history          SOCIAL HX:   Smoking                         ETOH                            Other    FAMILY HISTORY:  .  No cardiovascular or pulmonary family history     REVIEW OF SYSTEMS:    All ROS are negative exept per HPI       Allergies    No Known Allergies    Intolerances          PHYSICAL EXAM  Vital Signs Last 24 Hrs  T(C): 37.2 (23 Sep 2024 05:57), Max: 37.8 (22 Sep 2024 16:48)  T(F): 98.9 (23 Sep 2024 05:57), Max: 100 (22 Sep 2024 16:48)  HR: 97 (23 Sep 2024 05:57) (97 - 128)  BP: 111/56 (23 Sep 2024 05:57) (101/59 - 122/68)  BP(mean): --  RR: 18 (23 Sep 2024 05:57) (18 - 26)  SpO2: 97% (23 Sep 2024 05:57) (97% - 100%)    Parameters below as of 23 Sep 2024 05:57  Patient On (Oxygen Delivery Method): room air        CONSTITUTIONAL:  Well nourished.  NAD    ENT:   Airway patent,   No thrush    EYES:   Clear bilaterally,   pupils equal,   round and reactive to light.    CARDIAC:   Normal rate,   regular rhythm.    no edema      RESPIRATORY:   No wheezing   Normal chest expansion  Not tachypneic,  No use of accessory muscles    GASTROINTESTINAL:  Abdomen soft, non-tender,   No guarding,   Positive BS    MUSCULOSKELETAL:   Range of motion is not limited,  No clubbing, cyanosis    NEUROLOGICAL:   Alert and oriented   No motor deficits.    SKIN:   Skin normal color for race,   No evidence of rash.      HEME LYMPH:   No cervical  lymphadenopathy.  no inguinal lymphadenopathy          LABS:                          10.0   10.90 )-----------( 450      ( 23 Sep 2024 12:11 )             32.5                                               09-23    128[L]  |  97[L]  |  4[L]  ----------------------------<  86  4.4   |  23  |  0.5[L]    Ca    9.0      23 Sep 2024 12:11  Mg     2.0     09-23    TPro  6.9  /  Alb  3.6  /  TBili  0.2  /  DBili  x   /  AST  13  /  ALT  <5  /  AlkPhos  64  09-23      PT/INR - ( 23 Sep 2024 12:11 )   PT: 15.50 sec;   INR: 1.36 ratio         PTT - ( 23 Sep 2024 04:03 )  PTT:62.8 sec                                       Urinalysis Basic - ( 23 Sep 2024 12:11 )    Color: x / Appearance: x / SG: x / pH: x  Gluc: 86 mg/dL / Ketone: x  / Bili: x / Urobili: x   Blood: x / Protein: x / Nitrite: x   Leuk Esterase: x / RBC: x / WBC x   Sq Epi: x / Non Sq Epi: x / Bacteria: x                                                  LIVER FUNCTIONS - ( 23 Sep 2024 12:11 )  Alb: 3.6 g/dL / Pro: 6.9 g/dL / ALK PHOS: 64 U/L / ALT: <5 U/L / AST: 13 U/L / GGT: x                                                                                                MEDICATIONS  (STANDING):  azithromycin   Tablet 500 milliGRAM(s) Oral daily  FLUoxetine 10 milliGRAM(s) Oral daily  heparin  Infusion.  Unit(s)/Hr (12 mL/Hr) IV Continuous <Continuous>  piperacillin/tazobactam IVPB.. 3.375 Gram(s) IV Intermittent every 8 hours    MEDICATIONS  (PRN):  heparin   Injectable 2500 Unit(s) IV Push every 6 hours PRN For aPTT between 40 - 57  heparin   Injectable 5500 Unit(s) IV Push every 6 hours PRN For aPTT less than 40      X-Rays reviewed:    CXR interpreted by me: Patient is a 44y old  Female who presents with a chief complaint of -Sepsis likely secondary to pneumonia (23 Sep 2024 12:18)      HPI:  Patient is a 45 year old F with PMHx of PE previously on eliquis, self weaned, neurofibromatosis, depression, and migraines who presented to the ED on 9/23; she is complaining of cough, clear frothy sputum, and low grade temp x1d. She denies any other complaints.     Vitals in ED: T 100, , /59, RR 18, SpO2 98% on RA  Labs: WBC 17k, Hgb 11.3 (around baseline ~11)  Imaging:  CTA Chest:  - Pulmonary embolus within the distal left main pulmonary artery. No CTA evidence of right heart strain.  - Complex areas of bronchiectasis and cavitary lesions with superimposed consolidations within the left upper and lower lung fields.  - Additional consolidative opacities within the right upper and lower lobes    Given 1L LR bolus in ED and started on heparin drip, admitted to telemetry for management of PE + sepsis likely 2/2 pneumonia.    Pulm consulted for cavitary lesions on imaging   Patient began seeing pulmonary around 2007-8 she was then lost to follow up  She then began to see Dr. Abarca where the sputum showed rare mac  She was       PAST MEDICAL & SURGICAL HISTORY:  Pulmonary embolism      Migraine      No significant past surgical history          SOCIAL HX:   Smoking                         ETOH                            Other    FAMILY HISTORY:  .  No cardiovascular or pulmonary family history     REVIEW OF SYSTEMS:    All ROS are negative exept per HPI       Allergies    No Known Allergies    Intolerances          PHYSICAL EXAM  Vital Signs Last 24 Hrs  T(C): 37.2 (23 Sep 2024 05:57), Max: 37.8 (22 Sep 2024 16:48)  T(F): 98.9 (23 Sep 2024 05:57), Max: 100 (22 Sep 2024 16:48)  HR: 97 (23 Sep 2024 05:57) (97 - 128)  BP: 111/56 (23 Sep 2024 05:57) (101/59 - 122/68)  BP(mean): --  RR: 18 (23 Sep 2024 05:57) (18 - 26)  SpO2: 97% (23 Sep 2024 05:57) (97% - 100%)    Parameters below as of 23 Sep 2024 05:57  Patient On (Oxygen Delivery Method): room air        CONSTITUTIONAL:  Well nourished.  NAD    ENT:   Airway patent,   No thrush    EYES:   Clear bilaterally,   pupils equal,   round and reactive to light.    CARDIAC:   Normal rate,   regular rhythm.    no edema      RESPIRATORY:   No wheezing   Normal chest expansion  Not tachypneic,  No use of accessory muscles    GASTROINTESTINAL:  Abdomen soft, non-tender,   No guarding,   Positive BS    MUSCULOSKELETAL:   Range of motion is not limited,  No clubbing, cyanosis    NEUROLOGICAL:   Alert and oriented   No motor deficits.    SKIN  Neurofibromas          LABS:                          10.0   10.90 )-----------( 450      ( 23 Sep 2024 12:11 )             32.5                                               09-23    128[L]  |  97[L]  |  4[L]  ----------------------------<  86  4.4   |  23  |  0.5[L]    Ca    9.0      23 Sep 2024 12:11  Mg     2.0     09-23    TPro  6.9  /  Alb  3.6  /  TBili  0.2  /  DBili  x   /  AST  13  /  ALT  <5  /  AlkPhos  64  09-23      PT/INR - ( 23 Sep 2024 12:11 )   PT: 15.50 sec;   INR: 1.36 ratio         PTT - ( 23 Sep 2024 04:03 )  PTT:62.8 sec                                       Urinalysis Basic - ( 23 Sep 2024 12:11 )    Color: x / Appearance: x / SG: x / pH: x  Gluc: 86 mg/dL / Ketone: x  / Bili: x / Urobili: x   Blood: x / Protein: x / Nitrite: x   Leuk Esterase: x / RBC: x / WBC x   Sq Epi: x / Non Sq Epi: x / Bacteria: x                                                  LIVER FUNCTIONS - ( 23 Sep 2024 12:11 )  Alb: 3.6 g/dL / Pro: 6.9 g/dL / ALK PHOS: 64 U/L / ALT: <5 U/L / AST: 13 U/L / GGT: x                                                                                                MEDICATIONS  (STANDING):  azithromycin   Tablet 500 milliGRAM(s) Oral daily  FLUoxetine 10 milliGRAM(s) Oral daily  heparin  Infusion.  Unit(s)/Hr (12 mL/Hr) IV Continuous <Continuous>  piperacillin/tazobactam IVPB.. 3.375 Gram(s) IV Intermittent every 8 hours    MEDICATIONS  (PRN):  heparin   Injectable 2500 Unit(s) IV Push every 6 hours PRN For aPTT between 40 - 57  heparin   Injectable 5500 Unit(s) IV Push every 6 hours PRN For aPTT less than 40       Patient is a 44y old  Female who presents with a chief complaint of -Sepsis likely secondary to pneumonia (23 Sep 2024 12:18)    45 year old F with PMHx of PE previously on eliquis, self weaned, neurofibromatosis, depression, and migraines who presented to the ED on 9/23; she is complaining of cough, greenish sputum, and low grade temp x1d. She denies any other complaints.     Vitals in ED: T 100, , /59, RR 18, SpO2 98% on RA  Labs: WBC 17k, Hgb 11.3 (around baseline ~11)  Imaging:  CTA Chest:  - Pulmonary embolus within the distal left main pulmonary artery. No CTA evidence of right heart strain.  - Complex areas of bronchiectasis and cavitary lesions with superimposed consolidations within the left upper and lower lung fields.  - Additional consolidative opacities within the right upper and lower lobes    Given 1L LR bolus in ED and started on heparin drip, admitted to telemetry for management of PE + sepsis likely 2/2 pneumonia.    Pulm consulted for cavitary lesions on imaging   Patient began seeing pulmonary around 2017-18 she was then lost to follow up  She then began to see Dr. Abarca where the sputum showed rare mac  She was sent to ID didnt see yet, reports weight loss      PAST MEDICAL & SURGICAL HISTORY:  Pulmonary embolism      Migraine      No significant past surgical history          SOCIAL HX:   Smoking  -    FAMILY HISTORY:  .  No cardiovascular or pulmonary family history     REVIEW OF SYSTEMS:    All ROS are negative exept per HPI       Allergies    No Known Allergies    Intolerances          PHYSICAL EXAM  Vital Signs Last 24 Hrs  T(C): 37.2 (23 Sep 2024 05:57), Max: 37.8 (22 Sep 2024 16:48)  T(F): 98.9 (23 Sep 2024 05:57), Max: 100 (22 Sep 2024 16:48)  HR: 97 (23 Sep 2024 05:57) (97 - 128)  BP: 111/56 (23 Sep 2024 05:57) (101/59 - 122/68)  RR: 18 (23 Sep 2024 05:57) (18 - 26)  SpO2: 97% (23 Sep 2024 05:57) (97% - 100%)    Parameters below as of 23 Sep 2024 05:57  Patient On (Oxygen Delivery Method): room air        CONSTITUTIONAL:  Well nourished.  NAD    ENT:   Airway patent,   No thrush    EYES:   Clear bilaterally,   pupils equal,   round and reactive to light.    CARDIAC:   Normal rate,   regular rhythm.    no edema      RESPIRATORY:   l side rhonchi    GASTROINTESTINAL:  Abdomen soft, non-tender,   No guarding,   Positive BS    MUSCULOSKELETAL:   Range of motion is not limited,  No clubbing, cyanosis    NEUROLOGICAL:   Alert and oriented   No motor deficits.    SKIN  Neurofibromas          LABS:                          10.0   10.90 )-----------( 450      ( 23 Sep 2024 12:11 )             32.5                                               09-23    128[L]  |  97[L]  |  4[L]  ----------------------------<  86  4.4   |  23  |  0.5[L]    Ca    9.0      23 Sep 2024 12:11  Mg     2.0     09-23    TPro  6.9  /  Alb  3.6  /  TBili  0.2  /  DBili  x   /  AST  13  /  ALT  <5  /  AlkPhos  64  09-23      PT/INR - ( 23 Sep 2024 12:11 )   PT: 15.50 sec;   INR: 1.36 ratio         PTT - ( 23 Sep 2024 04:03 )  PTT:62.8 sec                                       Urinalysis Basic - ( 23 Sep 2024 12:11 )    Color: x / Appearance: x / SG: x / pH: x  Gluc: 86 mg/dL / Ketone: x  / Bili: x / Urobili: x   Blood: x / Protein: x / Nitrite: x   Leuk Esterase: x / RBC: x / WBC x   Sq Epi: x / Non Sq Epi: x / Bacteria: x                                                  LIVER FUNCTIONS - ( 23 Sep 2024 12:11 )  Alb: 3.6 g/dL / Pro: 6.9 g/dL / ALK PHOS: 64 U/L / ALT: <5 U/L / AST: 13 U/L / GGT: x                                                                                                MEDICATIONS  (STANDING):  azithromycin   Tablet 500 milliGRAM(s) Oral daily  FLUoxetine 10 milliGRAM(s) Oral daily  heparin  Infusion.  Unit(s)/Hr (12 mL/Hr) IV Continuous <Continuous>  piperacillin/tazobactam IVPB.. 3.375 Gram(s) IV Intermittent every 8 hours    MEDICATIONS  (PRN):  heparin   Injectable 2500 Unit(s) IV Push every 6 hours PRN For aPTT between 40 - 57  heparin   Injectable 5500 Unit(s) IV Push every 6 hours PRN For aPTT less than 40

## 2024-09-23 NOTE — PROGRESS NOTE ADULT - ASSESSMENT
ASSESSMENT  44yFemale with a PMH of PE presented to the ED with shortness of breath and cough. Vitals in the ED met criteria for sepsis and CTA chest results showed PE within distal left main pulmonary artery and cavitary lesions with consolidations.    IMPRESSION  # Sepsis on admission (2 or more of the following T<96.8F, T>101F, Pulse>90, Resp Rate>20, WBC>12  # Pulmonary embolism of distal left pulmonary artery  # Left unilateral pneumonia /rule out TB    RECOMMENDATIONS  - f/u pending cultures blood and sputum  - Continue heparin and f/u hypercoagulability panel  - Continue contact precautions- f/u Qt gold results and acid fast sputum, urine strep and legionella, MRSA pcr  -    This is a pended note. All final recommendations to follow pending discussion with ID Attending

## 2024-09-23 NOTE — CONSULT NOTE ADULT - ASSESSMENT
ASSESSMENT  44y Female with a PMH of PE presented to the ED with shortness of breath and cough. Vitals in the ED met criteria for sepsis and CTA chest results showed PE within distal left main pulmonary artery and cavitary lesions with consolidations suggestive of bacterial pneumonia.    IMPRESSION  # Sepsis on admission (2 or more of the following T<96.8F, T>101F, Pulse>90, Resp Rate>20, WBC>12  # Pulmonary embolism of distal left pulmonary artery  # Left unilateral pneumonia   # Cavitary lesions    RECOMMENDATIONS  - f/u pending cultures blood and sputum  - Continue heparin and f/u hypercoagulability panel  - Recommend new Qt gold and acid fast sputum culture for cavitary lesion workup  - Continue same antibiotic regimen with vancomycin, zosyn, and azythromycin    This is a pended note. All final recommendations to follow pending discussion with ID Attending  ASSESSMENT  44y Female with a PMH of PE presented to the ED with shortness of breath and cough. Vitals in the ED met criteria for sepsis and CTA chest results showed PE within distal left main pulmonary artery and cavitary lesions with consolidations suggestive of bacterial pneumonia.    IMPRESSION  # Sepsis on admission (2 or more of the following T<96.8F, T>101F, Pulse>90, Resp Rate>20, WBC>12  # Pulmonary embolism of distal left pulmonary artery  # Left unilateral pneumonia   # Cavitary lesions    RECOMMENDATIONS  - f/u pending cultures blood and sputum  - Continue heparin and f/u hypercoagulability panel  - Recommend new Qt gold and acid fast sputum culture for cavitary lesion workup  - Continue same antibiotic regimen with vancomycin, zosyn, and azythromycin    Please call or message on [employer] Teams if with any questions.  Spectra [phone]

## 2024-09-23 NOTE — H&P ADULT - ASSESSMENT
**THIS NOTE IS INCOMPLETE**    Patient is a 45 year old F with PMHx of PE previously on eliquis, self weaned, neurofibromatosis, and migraines who presented to the ED on 9/23; she is complaining of cough, clear frothy sputum, and low grade temp x1d. She denies any other complaints.     Vitals in ED: T 100, , /59, RR 18, SpO2 98% on RA  Labs: WBC 17k, Hgb 11.3 (around baseline ~11)  Imaging:  CTA Chest:  - Pulmonary embolus within the distal left main pulmonary artery. No CTA evidence of right heart strain.  - Complex areas of bronchiectasis and cavitary lesions with superimposed consolidations within the left upper and lower lung fields.  - Additional consolidative opacities within the right upper and lower lobes    Given 1L LR bolus in ED and started on heparin drip, admitted to telemetry.       #Pulmonary Embolus  #Hx of PEs previously on Eliquis  - patient has hx of PEs, was previously on Eliquis   - CTA Chest: Pulmonary embolus within the distal left main pulmonary artery. No CTA evidence of right heart strain.  Plan:  - started on heparin gtt  - workup for hypercoagulable state: protein C + S, JAK2, Beta-2-glycoprotein, factor V, anticardiolipin   - consider heme/onc consult after hypercoagulable workup is obtained    #Septic on admission secondary to possible Pneumonia  - CT Chest shows complex areas of bronchiectasis and cavitary lesions with superimposed consolidations within the left upper and lower lung fields. Additional consolidative opacities within the right upper and lower lobes      MISC  #DVT prophylaxis: Heparin gtt  #GI prophylaxis: PPI  #Diet:   #Activity:   #Code status: Full Code  #Disposition: Admit to Telemetry   **THIS NOTE IS INCOMPLETE**    Patient is a 45 year old F with PMHx of PE previously on eliquis, self weaned, neurofibromatosis, and migraines who presented to the ED on 9/23; she is complaining of cough, clear frothy sputum, and low grade temp x1d. She denies any other complaints.     Vitals in ED: T 100, , /59, RR 18, SpO2 98% on RA  Labs: WBC 17k, Hgb 11.3 (around baseline ~11)  Imaging:  CTA Chest:  - Pulmonary embolus within the distal left main pulmonary artery. No CTA evidence of right heart strain.  - Complex areas of bronchiectasis and cavitary lesions with superimposed consolidations within the left upper and lower lung fields.  - Additional consolidative opacities within the right upper and lower lobes    Given 1L LR bolus in ED and started on heparin drip, admitted to telemetry.       #Pulmonary Embolus  #Hx of PEs previously on Eliquis  - patient has hx of PEs, was previously on Eliquis   - CTA Chest: Pulmonary embolus within the distal left main pulmonary artery. No CTA evidence of right heart strain.  Plan:  - started on heparin gtt  - workup for hypercoagulable state: protein C + S, JAK2, Beta-2-glycoprotein, factor V, anticardiolipin   - consider heme/onc consult after hypercoagulable workup is obtained    #Septic on admission secondary to possible Pneumonia  #Chronic cavitary lesions  - CT Chest shows complex areas of bronchiectasis and cavitary lesions with superimposed consolidations within the left upper and lower lung fields. Additional consolidative opacities within the right upper and lower lobes  Plan:  - f/u blood culture  - f/u sputum culture        MISC  #DVT prophylaxis: Heparin gtt  #GI prophylaxis: PPI  #Diet: Regular  #Activity: IAT  #Code status: Full Code  #Disposition: Admit to Telemetry   **THIS NOTE IS INCOMPLETE**    Patient is a 45 year old F with PMHx of PE previously on eliquis, self weaned, neurofibromatosis, and migraines who presented to the ED on 9/23; she is complaining of cough, clear frothy sputum, and low grade temp x1d. She denies any other complaints.Admitted to telemetry.       #Pulmonary Embolus  #Hx of PEs previously on Eliquis  - patient has hx of PEs, was previously on Eliquis   - CTA Chest: Pulmonary embolus within the distal left main pulmonary artery. No CTA evidence of right heart strain.  Plan:  - started on heparin gtt  - workup for hypercoagulable state: protein C + S, JAK2, Beta-2-glycoprotein, factor V, anticardiolipin   - consider heme/onc consult after hypercoagulable workup is obtained    #Septic on admission secondary to possible Pneumonia  #Hx of cavitary lesions  - CT Chest shows complex areas of bronchiectasis and cavitary lesions with superimposed consolidations within the left upper and lower lung fields. Additional consolidative opacities within the right upper and lower lobes  Plan:  - given 1L LR bolus in ED, gave additional 750 cc bolus, will start maintenance fluids after   - f/u blood culture  - f/u sputum culture  - urine strep + legionella  - MRSA PCR  - Procal  - Acid-Fast sputum x3  - isolation precautions for TB  - given cavitary lesions, will start Vanc, Zosyn, and Azithromycin and consult ID  - pulm consult for areas of bronchiectasis, possible bronch?    MISC  #DVT prophylaxis: Heparin gtt  #GI prophylaxis: PPI  #Diet: Regular  #Activity: IAT  #Code status: Full Code  #Disposition: Admit to Telemetry   **THIS NOTE IS INCOMPLETE**    Patient is a 45 year old F with PMHx of PE previously on eliquis, self weaned, neurofibromatosis, and migraines who presented to the ED on 9/23; she is complaining of cough, clear frothy sputum, and low grade temp x1d. She denies any other complaints. Admitted to telemetry.       #Pulmonary Embolus  #Hx of PEs previously on Eliquis  - patient has hx of PEs, was previously on Eliquis   - CTA Chest: Pulmonary embolus within the distal left main pulmonary artery. No CTA evidence of right heart strain.  Plan:  - started on heparin gtt  - workup for hypercoagulable state: protein C + S, JAK2, Beta-2-glycoprotein, factor V, anticardiolipin   - consider heme/onc consult after hypercoagulable workup is obtained    #Septic on admission secondary to possible Pneumonia  #Hx of cavitary lesions  - CT Chest shows complex areas of bronchiectasis and cavitary lesions with superimposed consolidations within the left upper and lower lung fields. Additional consolidative opacities within the right upper and lower lobes  Plan:  - given 1L LR bolus in ED, gave additional 750 cc bolus, will start maintenance fluids after   - f/u blood culture  - f/u sputum culture  - urine strep + legionella  - MRSA PCR  - Procal  - Acid-Fast sputum x3  - isolation precautions for TB  - given cavitary lesions, will start Vanc, Zosyn, and Azithromycin and consult ID  - pulm consult for areas of bronchiectasis, possible bronch?    MISC  #DVT prophylaxis: Heparin gtt  #GI prophylaxis: PPI  #Diet: Regular  #Activity: IAT  #Code status: Full Code  #Disposition: Admit to Telemetry   Patient is a 45 year old F with PMHx of PE previously on eliquis, self weaned, neurofibromatosis, depression, and migraines who presented to the ED on 9/23; she is complaining of cough, clear frothy sputum, and low grade temp x1d. She denies any other complaints. Admitted to telemetry.       #Pulmonary Embolus  #Hx of PEs previously on Eliquis  - patient has hx of PEs, was previously on Eliquis   - CTA Chest: Pulmonary embolus within the distal left main pulmonary artery. No CTA evidence of right heart strain.  Plan:  - started on heparin gtt  - workup for hypercoagulable state: protein C + S, JAK2, Beta-2-glycoprotein, factor V, anticardiolipin   - consider heme/onc consult after hypercoagulable workup is obtained    #Septic on admission secondary to possible Pneumonia  #Hx of cavitary lesions  - CT Chest shows complex areas of bronchiectasis and cavitary lesions with superimposed consolidations within the left upper and lower lung fields. Additional consolidative opacities within the right upper and lower lobes  Plan:  - given 1L LR bolus in ED, gave additional 750 cc bolus, will start maintenance fluids after   - f/u blood culture  - f/u sputum culture  - urine strep + legionella  - MRSA PCR  - Procal  - Acid-Fast sputum x3  - isolation precautions for TB  - given cavitary lesions, will start Vanc, Zosyn, and Azithromycin and consult ID  - pulm consult for further eval, possible bronch?    #Depression  - c/w Fluoxetine 10 mg qd    MISC  #DVT prophylaxis: Heparin gtt  #GI prophylaxis: PPI  #Diet: Regular  #Activity: IAT  #Code status: Full Code  #Disposition: Admit to Telemetry   Patient is a 45 year old F with PMHx of PE previously on eliquis, self weaned, neurofibromatosis, depression, and migraines who presented to the ED on 9/23; she is complaining of cough, clear frothy sputum, and low grade temp x1d. She denies any other complaints. Admitted to telemetry.       #Pulmonary Embolus  #Hx of PEs previously on Eliquis  - patient has hx of PEs, was previously on Eliquis   - CTA Chest: Pulmonary embolus within the distal left main pulmonary artery. No CTA evidence of right heart strain.  Plan:  - started on heparin gtt  - workup for hypercoagulable state: protein C + S, JAK2, Beta-2-glycoprotein, factor V, anticardiolipin   - consider heme/onc consult after hypercoagulable workup is obtained    #Septic on admission likely secondary to Pneumonia  #Hx of cavitary lesions  - CT Chest shows complex areas of bronchiectasis and cavitary lesions with superimposed consolidations within the left upper and lower lung fields. Additional consolidative opacities within the right upper and lower lobes  Plan:  - given 1L LR bolus in ED, gave additional 750 cc bolus, will start maintenance fluids after   - f/u blood culture  - f/u sputum culture  - urine strep + legionella  - MRSA PCR  - Procal  - though unlikely, isolation precautions for TB until patient is seen by ID  - Acid-Fast sputum x3 + Quantiferon Gold   - given cavitary lesions, will start Vanc, Zosyn, and Azithromycin and consult ID  - pulm consult for further eval, possible bronch?    #Depression  - c/w Fluoxetine 10 mg qd    MISC  #DVT prophylaxis: Heparin gtt  #GI prophylaxis: PPI  #Diet: Regular  #Activity: IAT  #Code status: Full Code  #Disposition: Admit to Telemetry

## 2024-09-23 NOTE — CONSULT NOTE ADULT - SUBJECTIVE AND OBJECTIVE BOX
LUBA RODRIGUEZ  44y, Female  Allergy: No Known Allergies      CHIEF COMPLAINT: -Sepsis likely secondary to pneumonia (23 Sep 2024 12:18)      LOS  1d    HPI:  Patient is a 45 year old F with PMHx of PE previously on eliquis, self weaned, neurofibromatosis, depression, and migraines who presented to the ED on 9/23; she is complaining of cough, clear frothy sputum, and low grade temp x1d. She denies any other complaints.     Vitals in ED: T 100, , /59, RR 18, SpO2 98% on RA  Labs: WBC 17k, Hgb 11.3 (around baseline ~11)  Imaging:  CTA Chest:  - Pulmonary embolus within the distal left main pulmonary artery. No CTA evidence of right heart strain.  - Complex areas of bronchiectasis and cavitary lesions with superimposed consolidations within the left upper and lower lung fields.  - Additional consolidative opacities within the right upper and lower lobes    Given 1L LR bolus in ED and started on heparin drip, admitted to telemetry for management of PE + sepsis likely 2/2 pneumonia.     (23 Sep 2024 01:02)      INFECTIOUS DISEASE HISTORY:  History as above.  Started to feel unwell this past Thursday.   Initially felt it was colds, but started to have generazlied malaise and difficulty in bringing up cough.   Denies nausea, vomiting, abdominal pain, dysuria, hematuria.     PAST MEDICAL & SURGICAL HISTORY:  Pulmonary embolism      Migraine      No significant past surgical history          FAMILY HISTORY  No pertinent family history in first degree relatives        SOCIAL HISTORY  Social History:  No recent travel  Denies etoh use, drug use     ROS  General: Denies rigors, nightsweats  HEENT: Denies headache, rhinorrhea, sore throat, eye pain  CV: Denies CP, palpitations  PULM: Denies wheezing, hemoptysis  GI: Denies hematemesis, hematochezia, melena  : Denies discharge, hematuria  MSK: Denies arthralgias, myalgias  SKIN: Denies rash, lesions  NEURO: Denies paresthesias, weakness  PSYCH: Denies depression, anxiety    VITALS:  T(F): 98.9, Max: 100 (09-22-24 @ 16:48)  HR: 97  BP: 111/56  RR: 18Vital Signs Last 24 Hrs  T(C): 37.2 (23 Sep 2024 05:57), Max: 37.8 (22 Sep 2024 16:48)  T(F): 98.9 (23 Sep 2024 05:57), Max: 100 (22 Sep 2024 16:48)  HR: 97 (23 Sep 2024 05:57) (97 - 128)  BP: 111/56 (23 Sep 2024 05:57) (101/59 - 122/68)  BP(mean): --  RR: 18 (23 Sep 2024 05:57) (18 - 26)  SpO2: 97% (23 Sep 2024 05:57) (97% - 100%)    Parameters below as of 23 Sep 2024 05:57  Patient On (Oxygen Delivery Method): room air        PHYSICAL EXAM:  Gen: NAD, resting in bed  HEENT: Normocephalic, atraumatic  Neck: supple, no lymphadenopathy  CV: Regular rate & regular rhythm  Lungs: decreased BS at bases, no fremitus  Abdomen: Soft, BS present  Ext: Warm, well perfused  Neuro: non focal, awake  Skin: no rash, no erythema  Lines: no phlebitis    TESTS & MEASUREMENTS:                        10.0   10.90 )-----------( 450      ( 23 Sep 2024 12:11 )             32.5     09-23    128[L]  |  97[L]  |  4[L]  ----------------------------<  86  4.4   |  23  |  0.5[L]    Ca    9.0      23 Sep 2024 12:11  Mg     2.0     09-23    TPro  6.9  /  Alb  3.6  /  TBili  0.2  /  DBili  x   /  AST  13  /  ALT  <5  /  AlkPhos  64  09-23      LIVER FUNCTIONS - ( 23 Sep 2024 12:11 )  Alb: 3.6 g/dL / Pro: 6.9 g/dL / ALK PHOS: 64 U/L / ALT: <5 U/L / AST: 13 U/L / GGT: x           Urinalysis Basic - ( 23 Sep 2024 12:11 )    Color: x / Appearance: x / SG: x / pH: x  Gluc: 86 mg/dL / Ketone: x  / Bili: x / Urobili: x   Blood: x / Protein: x / Nitrite: x   Leuk Esterase: x / RBC: x / WBC x   Sq Epi: x / Non Sq Epi: x / Bacteria: x          Lactate, Blood: 1.7 mmol/L (09-23-24 @ 12:11)  Lactate, Blood: 1.0 mmol/L (09-22-24 @ 20:26)      INFECTIOUS DISEASES TESTING  MRSA PCR Result.: Negative (09-23-24 @ 11:25)      RADIOLOGY & ADDITIONAL TESTS:  I have personally reviewed the last Chest xray  CXR  Xray Chest 1 View- PORTABLE-Urgent:   ACC: [id number] EXAM:  XR CHEST PORTABLE URGENT 1V   ORDERED BY: YOUNG MCFARLAND     PROCEDURE DATE:  09/22/2024          INTERPRETATION:  Clinical History / Reason for exam: Shortness of breath    Comparison : Chest radiograph correlation with chest CT 9/22/2024.    Technique/Positioning: Single AP chest radiograph.    Findings:    Support devices: None.    Cardiac/mediastinum/hilum: Partially obscured    Lung parenchyma/Pleura: Left upper lobe cavitary lesion is not well   visualized on this exam. Left lower lobe opacities and cavitary lesions   are similar compared to recent CT. Elevation of the left hemidiaphragm.   The right lung appears clear. No pneumothorax.    Skeleton/soft tissues: Scoliotic curvature of the spine and degenerative   changes.    Impression:    Similar appearance of left lung opacities and cavitary lesions compared   with concurrent CT chest. Elevation of the left hemidiaphragm.    --- End of Report ---            LYDIA CHAU MD; Attending Radiologist  This document has been electronically signed. Sep 23 2024  9:34AM (09-22-24 @ 19:22)      CT  CT Angio Chest PE Protocol w/ IV Cont:   ACC: [id number] EXAM:  CT ANGIO CHEST PULM [facility]   ORDERED BY: YOUNG MCFARLAND     PROCEDURE DATE:  09/22/2024          INTERPRETATION:  CLINICAL INFORMATION: Shortness of breath    COMPARISON: CT CHEST 5/7/2024.    CONTRAST/COMPLICATIONS:  IV Contrast: Omnipaque 350  65 cc administered   35 cc discarded  Oral Contrast: NONE  Complications: None reported at time of study completion    PROCEDURE:  CT Angiography of the Chest.  Sagittal and coronal reformats were performed as well as 3D (MIP)   reconstructions.    FINDINGS:    LUNGS AND LARGE AIRWAYS: Scattered complex areas of bronchiectasis with   superimposed cavitary lesions and consolidations within the left lung.   Additional cavitary lesions and consolidations within the right upper and   right lower lobe.  PLEURA: Trace left pleural effusion  VESSELS: Pulmonary embolus within the distal left main pulmonary artery.  HEART: Heart size is normal. No pericardial effusion.  MEDIASTINUM AND ANA: Multiple enlarged prevascular, paratracheal and   subcarinal lymph nodes, likely reactive  CHEST WALL AND LOWER NECK: Left lower lobe hypodensity  VISUALIZED UPPER ABDOMEN: Within normal limits.  BONES: Within normal limits.    IMPRESSION:  Pulmonary embolus within the distal left main pulmonary artery. No CTA   evidence of right heart strain.    Complex areas of bronchiectasis and cavitary lesions with superimposed   consolidations within the left upper and lower lung fields.    Additional consolidative opacities within the right upper and lower lobes      Message sent to YOUNG BANUELOS DO [phone]; Attending Emergency Medicine   via secure MS Teams chat on 9/22/2024 8:30 PM with readback.    Callback could not be created given technical issues.    --- End of Report ---            DARLENE MUKHERJEE MD; Attending Radiologist  This document has been electronically signed. Sep 22 2024  8:32PM (09-22-24 @ 20:16)      CARDIOLOGY TESTING  12 Lead ECG:   Ventricular Rate 121 BPM    Atrial Rate 121 BPM    P-R Interval 174 ms    QRS Duration 84 ms    Q-T Interval 306 ms    QTC Calculation(Bazett) 434 ms    P Axis 67 degrees    R Axis -19 degrees    T Axis 67 degrees    Diagnosis Line Sinus tachycardia  Possible Left atrial enlargement  Borderline ECG    Confirmed by Alfredo Miller ([id number]) on 9/22/2024 5:36:59 PM (09-22-24 @ 16:47)      MEDICATIONS  azithromycin   Tablet 500 Oral daily  FLUoxetine 10 Oral daily  heparin  Infusion.  IV Continuous <Continuous>  piperacillin/tazobactam IVPB.. 3.375 IV Intermittent every 8 hours      Weight  Weight (kg): 66.2 (09-22-24 @ 16:53)    ANTIBIOTICS:  azithromycin   Tablet 500 milliGRAM(s) Oral daily  piperacillin/tazobactam IVPB.. 3.375 Gram(s) IV Intermittent every 8 hours      ALLERGIES:  No Known Allergies

## 2024-09-23 NOTE — CONSULT NOTE ADULT - SUBJECTIVE AND OBJECTIVE BOX
LUBA RODRIGUEZ  44y, Female  Allergy: No Known Allergies    CHIEF COMPLAINT:   HPI:  Patient is a 45 year old F with PMHx of PE previously on eliquis, self weaned, neurofibromatosis, depression, and migraines who presented to the ED on 9/23 complaining of cough, clear frothy sputum, and low grade temp x1d since last Thursday. Patient was admitted for sepsis likely due to pneumonia from imaging showing consolidation in the left lower lobe. She was started on heparin, vancomycin, zosyn, and azithromycin. Patient has a history of cavitary lesions on imaging and was told they were due to changes caused by a previous PE. Today, patient is talking comfortably endorses improvement in cough which is productive with white sputum. Denies headache, fevers, shortness of breath, chest pain, headache, abdominal pain, pain or swelling in lower extremities.   Imaging:  CTA Chest:  - Pulmonary embolus within the distal left main pulmonary artery. No CTA evidence of right heart strain.  - Complex areas of bronchiectasis and cavitary lesions with superimposed consolidations within the left upper and lower lung fields.  - Additional consolidative opacities within the right upper and lower lobes    Infectious Diseases History:  Old Micro Data/Cultures:     FAMILY HISTORY:  Sister - Lupus  Aunt- Breast Cancer  PAST MEDICAL & SURGICAL HISTORY:  Pulmonary embolism      Migraine      No significant past surgical history          SOCIAL HISTORY  Social History:      Recent Travel:  Other Exposures:     ROS  General: Denies rigors, nightsweats  HEENT: Denies headache, rhinorrhea, sore throat, eye pain  CV: Denies CP, palpitations  PULM: Denies wheezing, hemoptysis  GI: Denies hematemesis, hematochezia, melena  : Denies discharge, hematuria  MSK: Denies arthralgias, myalgias  SKIN: Denies rash, lesions  NEURO: Denies paresthesias, weakness  PSYCH: Denies depression, anxiety    VITALS:  T(F): 98.9, Max: 100 (09-22-24 @ 16:48)  HR: 97  BP: 111/56  RR: 18Vital Signs Last 24 Hrs  T(C): 37.2 (23 Sep 2024 05:57), Max: 37.8 (22 Sep 2024 16:48)  T(F): 98.9 (23 Sep 2024 05:57), Max: 100 (22 Sep 2024 16:48)  HR: 97 (23 Sep 2024 05:57) (97 - 128)  BP: 111/56 (23 Sep 2024 05:57) (101/59 - 122/68)  BP(mean): --  RR: 18 (23 Sep 2024 05:57) (18 - 26)  SpO2: 97% (23 Sep 2024 05:57) (97% - 100%)    Parameters below as of 23 Sep 2024 05:57  Patient On (Oxygen Delivery Method): room air        PHYSICAL EXAM:  Gen: NAD, resting in bed  HEENT: Normocephalic, atraumatic  Neck: supple, no lymphadenopathy  CV: Regular rate & regular rhythm  Lungs: Decreased breath sounds on left lower lobe. No accessory muscles   of respiration use.  Abdomen: Soft, BS present  Ext: Warm, well perfused  Neuro: non focal, awake  Skin: no rash, no lesions  Lines: no phlebitis    TESTS & MEASUREMENTS:                        10.2   13.43 )-----------( 443      ( 23 Sep 2024 04:03 )             32.4     09-22    140  |  99  |  7[L]  ----------------------------<  81  4.2   |  20  |  0.7    Ca    9.4      22 Sep 2024 17:25    TPro  8.3[H]  /  Alb  4.0  /  TBili  0.4  /  DBili  x   /  AST  12  /  ALT  6   /  AlkPhos  74  09-22      LIVER FUNCTIONS - ( 22 Sep 2024 17:25 )  Alb: 4.0 g/dL / Pro: 8.3 g/dL / ALK PHOS: 74 U/L / ALT: 6 U/L / AST: 12 U/L / GGT: x           Urinalysis Basic - ( 22 Sep 2024 17:25 )    Color: x / Appearance: x / SG: x / pH: x  Gluc: 81 mg/dL / Ketone: x  / Bili: x / Urobili: x   Blood: x / Protein: x / Nitrite: x   Leuk Esterase: x / RBC: x / WBC x   Sq Epi: x / Non Sq Epi: x / Bacteria: x          Lactate, Blood: 1.0 mmol/L (09-22-24 @ 20:26)      INFECTIOUS DISEASES TESTING      RADIOLOGY & ADDITIONAL TESTS:  I have personally reviewed the last available Chest xray  CXR  Xray Chest 1 View- PORTABLE-Urgent:   ACC: [id number] EXAM:  XR CHEST PORTABLE URGENT 1V   ORDERED BY: YOUNG MCFARLAND     PROCEDURE DATE:  09/22/2024          INTERPRETATION:  Clinical History / Reason for exam: Shortness of breath    Comparison : Chest radiograph correlation with chest CT 9/22/2024.    Technique/Positioning: Single AP chest radiograph.    Findings:    Support devices: None.    Cardiac/mediastinum/hilum: Partially obscured    Lung parenchyma/Pleura: Left upper lobe cavitary lesion is not well   visualized on this exam. Left lower lobe opacities and cavitary lesions   are similar compared to recent CT. Elevation of the left hemidiaphragm.   The right lung appears clear. No pneumothorax.    Skeleton/soft tissues: Scoliotic curvature of the spine and degenerative   changes.    Impression:    Similar appearance of left lung opacities and cavitary lesions compared   with concurrent CT chest. Elevation of the left hemidiaphragm.    --- End of Report ---            LYDIA CHAU MD; Attending Radiologist  This document has been electronically signed. Sep 23 2024  9:34AM (09-22-24 @ 19:22)      CT  CT Angio Chest PE Protocol w/ IV Cont:   ACC: [id number] EXAM:  CT ANGIO CHEST PULM [facility]   ORDERED BY: YOUNG MCFARLAND     PROCEDURE DATE:  09/22/2024          INTERPRETATION:  CLINICAL INFORMATION: Shortness of breath    COMPARISON: CT CHEST 5/7/2024.    CONTRAST/COMPLICATIONS:  IV Contrast: Omnipaque 350  65 cc administered   35 cc discarded  Oral Contrast: NONE  Complications: None reported at time of study completion    PROCEDURE:  CT Angiography of the Chest.  Sagittal and coronal reformats were performed as well as 3D (MIP)   reconstructions.    FINDINGS:    LUNGS AND LARGE AIRWAYS: Scattered complex areas of bronchiectasis with   superimposed cavitary lesions and consolidations within the left lung.   Additional cavitary lesions and consolidations within the right upper and   right lower lobe.  PLEURA: Trace left pleural effusion  VESSELS: Pulmonary embolus within the distal left main pulmonary artery.  HEART: Heart size is normal. No pericardial effusion.  MEDIASTINUM AND ANA: Multiple enlarged prevascular, paratracheal and   subcarinal lymph nodes, likely reactive  CHEST WALL AND LOWER NECK: Left lower lobe hypodensity  VISUALIZED UPPER ABDOMEN: Within normal limits.  BONES: Within normal limits.    IMPRESSION:  Pulmonary embolus within the distal left main pulmonary artery. No CTA   evidence of right heart strain.    Complex areas of bronchiectasis and cavitary lesions with superimposed   consolidations within the left upper and lower lung fields.    Additional consolidative opacities within the right upper and lower lobes      Message sent to YOUNG BANUELOS DO [phone]; Attending Emergency Medicine   via secure MS Teams chat on 9/22/2024 8:30 PM with readback.    Callback could not be created given technical issues.    --- End of Report ---            DARLENE MUKHERJEE MD; Attending Radiologist  This document has been electronically signed. Sep 22 2024  8:32PM (09-22-24 @ 20:16)      CARDIOLOGY TESTING  12 Lead ECG:   Ventricular Rate 121 BPM    Atrial Rate 121 BPM    P-R Interval 174 ms    QRS Duration 84 ms    Q-T Interval 306 ms    QTC Calculation(Bazett) 434 ms    P Axis 67 degrees    R Axis -19 degrees    T Axis 67 degrees    Diagnosis Line Sinus tachycardia  Possible Left atrial enlargement  Borderline ECG

## 2024-09-24 LAB
ALBUMIN SERPL ELPH-MCNC: 3.2 G/DL — LOW (ref 3.5–5.2)
ALP SERPL-CCNC: 63 U/L — SIGNIFICANT CHANGE UP (ref 30–115)
ALT FLD-CCNC: 7 U/L — SIGNIFICANT CHANGE UP (ref 0–41)
ANION GAP SERPL CALC-SCNC: 15 MMOL/L — HIGH (ref 7–14)
APPEARANCE UR: ABNORMAL
APTT BLD: 74.2 SEC — CRITICAL HIGH (ref 27–39.2)
AST SERPL-CCNC: 17 U/L — SIGNIFICANT CHANGE UP (ref 0–41)
B2 GLYCOPROT1 IGA SER QL: <2 U/ML — SIGNIFICANT CHANGE UP
B2 GLYCOPROT1 IGG SER-ACNC: <1.4 U/ML — SIGNIFICANT CHANGE UP
B2 GLYCOPROT1 IGM SER-ACNC: <1.5 U/ML — SIGNIFICANT CHANGE UP
BASOPHILS # BLD AUTO: 0.11 K/UL — SIGNIFICANT CHANGE UP (ref 0–0.2)
BASOPHILS NFR BLD AUTO: 1.2 % — HIGH (ref 0–1)
BILIRUB SERPL-MCNC: <0.2 MG/DL — SIGNIFICANT CHANGE UP (ref 0.2–1.2)
BILIRUB UR-MCNC: NEGATIVE — SIGNIFICANT CHANGE UP
BUN SERPL-MCNC: <3 MG/DL — LOW (ref 10–20)
CALCIUM SERPL-MCNC: 9 MG/DL — SIGNIFICANT CHANGE UP (ref 8.4–10.5)
CARDIOLIPIN IGM SER-MCNC: <1.5 MPL U/ML — SIGNIFICANT CHANGE UP
CARDIOLIPIN IGM SER-MCNC: <1.6 GPL U/ML — SIGNIFICANT CHANGE UP
CHLORIDE SERPL-SCNC: 101 MMOL/L — SIGNIFICANT CHANGE UP (ref 98–110)
CO2 SERPL-SCNC: 20 MMOL/L — SIGNIFICANT CHANGE UP (ref 17–32)
COLOR SPEC: YELLOW — SIGNIFICANT CHANGE UP
CREAT SERPL-MCNC: <0.5 MG/DL — LOW (ref 0.7–1.5)
DEPRECATED CARDIOLIPIN IGA SER: <2 APL U/ML — SIGNIFICANT CHANGE UP
DIFF PNL FLD: ABNORMAL
EGFR: 125 ML/MIN/1.73M2 — SIGNIFICANT CHANGE UP
EOSINOPHIL # BLD AUTO: 0.32 K/UL — SIGNIFICANT CHANGE UP (ref 0–0.7)
EOSINOPHIL NFR BLD AUTO: 3.5 % — SIGNIFICANT CHANGE UP (ref 0–8)
FACT V ACT/NOR PPP: 92 % — SIGNIFICANT CHANGE UP (ref 50–150)
GLUCOSE SERPL-MCNC: 88 MG/DL — SIGNIFICANT CHANGE UP (ref 70–99)
GLUCOSE UR QL: NEGATIVE MG/DL — SIGNIFICANT CHANGE UP
HCT VFR BLD CALC: 32.4 % — LOW (ref 37–47)
HGB BLD-MCNC: 10 G/DL — LOW (ref 12–16)
IMM GRANULOCYTES NFR BLD AUTO: 0.4 % — HIGH (ref 0.1–0.3)
KETONES UR-MCNC: NEGATIVE MG/DL — SIGNIFICANT CHANGE UP
LEUKOCYTE ESTERASE UR-ACNC: NEGATIVE — SIGNIFICANT CHANGE UP
LYMPHOCYTES # BLD AUTO: 2.72 K/UL — SIGNIFICANT CHANGE UP (ref 1.2–3.4)
LYMPHOCYTES # BLD AUTO: 29.6 % — SIGNIFICANT CHANGE UP (ref 20.5–51.1)
MAGNESIUM SERPL-MCNC: 1.9 MG/DL — SIGNIFICANT CHANGE UP (ref 1.8–2.4)
MCHC RBC-ENTMCNC: 22.7 PG — LOW (ref 27–31)
MCHC RBC-ENTMCNC: 30.9 G/DL — LOW (ref 32–37)
MCV RBC AUTO: 73.5 FL — LOW (ref 81–99)
MONOCYTES # BLD AUTO: 0.99 K/UL — HIGH (ref 0.1–0.6)
MONOCYTES NFR BLD AUTO: 10.8 % — HIGH (ref 1.7–9.3)
NEUTROPHILS # BLD AUTO: 5.02 K/UL — SIGNIFICANT CHANGE UP (ref 1.4–6.5)
NEUTROPHILS NFR BLD AUTO: 54.5 % — SIGNIFICANT CHANGE UP (ref 42.2–75.2)
NIGHT BLUE STAIN TISS: SIGNIFICANT CHANGE UP
NITRITE UR-MCNC: NEGATIVE — SIGNIFICANT CHANGE UP
NRBC # BLD: 0 /100 WBCS — SIGNIFICANT CHANGE UP (ref 0–0)
PH UR: 6.5 — SIGNIFICANT CHANGE UP (ref 5–8)
PLATELET # BLD AUTO: 429 K/UL — HIGH (ref 130–400)
PMV BLD: 10.2 FL — SIGNIFICANT CHANGE UP (ref 7.4–10.4)
POTASSIUM SERPL-MCNC: 4.2 MMOL/L — SIGNIFICANT CHANGE UP (ref 3.5–5)
POTASSIUM SERPL-SCNC: 4.2 MMOL/L — SIGNIFICANT CHANGE UP (ref 3.5–5)
PROT SERPL-MCNC: 6.5 G/DL — SIGNIFICANT CHANGE UP (ref 6–8)
PROT UR-MCNC: NEGATIVE MG/DL — SIGNIFICANT CHANGE UP
RBC # BLD: 4.41 M/UL — SIGNIFICANT CHANGE UP (ref 4.2–5.4)
RBC # FLD: 14.6 % — HIGH (ref 11.5–14.5)
SODIUM SERPL-SCNC: 136 MMOL/L — SIGNIFICANT CHANGE UP (ref 135–146)
SP GR SPEC: 1.01 — SIGNIFICANT CHANGE UP (ref 1–1.03)
SPECIMEN SOURCE: SIGNIFICANT CHANGE UP
UROBILINOGEN FLD QL: 1 MG/DL — SIGNIFICANT CHANGE UP (ref 0.2–1)
WBC # BLD: 9.2 K/UL — SIGNIFICANT CHANGE UP (ref 4.8–10.8)
WBC # FLD AUTO: 9.2 K/UL — SIGNIFICANT CHANGE UP (ref 4.8–10.8)

## 2024-09-24 PROCEDURE — 99232 SBSQ HOSP IP/OBS MODERATE 35: CPT

## 2024-09-24 PROCEDURE — 99233 SBSQ HOSP IP/OBS HIGH 50: CPT

## 2024-09-24 RX ORDER — ENOXAPARIN SODIUM 150 MG/ML
70 INJECTION SUBCUTANEOUS EVERY 12 HOURS
Refills: 0 | Status: DISCONTINUED | OUTPATIENT
Start: 2024-09-24 | End: 2024-09-27

## 2024-09-24 RX ADMIN — ENOXAPARIN SODIUM 70 MILLIGRAM(S): 150 INJECTION SUBCUTANEOUS at 18:20

## 2024-09-24 RX ADMIN — PIPERACILLIN SODIUM AND TAZOBACTAM SODIUM 25 GRAM(S): 12; 1.5 INJECTION, POWDER, LYOPHILIZED, FOR SOLUTION INTRAVENOUS at 06:55

## 2024-09-24 RX ADMIN — PIPERACILLIN SODIUM AND TAZOBACTAM SODIUM 25 GRAM(S): 12; 1.5 INJECTION, POWDER, LYOPHILIZED, FOR SOLUTION INTRAVENOUS at 22:52

## 2024-09-24 RX ADMIN — Medication 1500 UNIT(S)/HR: at 08:51

## 2024-09-24 RX ADMIN — PIPERACILLIN SODIUM AND TAZOBACTAM SODIUM 25 GRAM(S): 12; 1.5 INJECTION, POWDER, LYOPHILIZED, FOR SOLUTION INTRAVENOUS at 14:01

## 2024-09-24 RX ADMIN — Medication 10 MILLIGRAM(S): at 12:14

## 2024-09-24 RX ADMIN — Medication 1500 UNIT(S)/HR: at 00:44

## 2024-09-24 NOTE — PROGRESS NOTE ADULT - ASSESSMENT
ASSESSMENT  Patient is a 45 year old F with PMHx of PE previously on eliquis, self weaned, neurofibromatosis, depression, and migraines who presented to the ED on 9/23    IMPRESSION  #Cavitary Lesions, Chronic -- possible MAC infection   - Bronch 8/2018 - bacterial, fungal, AFB Cx negative   - Followed [facility]m outpatient -- Sputum Cx 5/31/2024 MAC, Candida lusitaniae --> recommended for ID evaluation as outpatient, has appointment in Oct 2024    #Superimposed bacterial pneumonia   - CT Angio Chest PE Protocol w/ IV Cont (09.22.24 @ 20:16): Pulmonary embolus within the distal left main pulmonary artery. No CTA  evidence of right heart strain.Complex areas of bronchiectasis and cavitary lesions with superimposed  consolidations within the left upper and lower lung fields. Additional consolidative opacities within the right upper and lower lobes  - WBC Count: 17.45 K/uL (09.22.24 @ 17:25)  - MRSA nares negative     #Neurofibromatosis   #Depression    #Obesity BMI (kg/m2): 25  #Abx allergy: No Known Allergies    RECOMMENDATIONS  - Reviewed Outpatient pulmonary notes -- AFB Cx x 1 with MAC  - follow-up AFB sputum Cx collected here  - please repeat AFB sputum here x 2    - follow-up bacteriual sputum Cx  - continue zosyn 3.375 mg q 8 hours for now until sputum Cx returns -- WBC is improvimg   - low suspicion for pulmonary TB -- likely Severe cavitary MAC -- can come off airborn isolation   - trend WBC     Please call or message on Microsoft Teams if with any questions.  Spectra [phone]

## 2024-09-24 NOTE — PROGRESS NOTE ADULT - SUBJECTIVE AND OBJECTIVE BOX
Patient is a 44y old  Female who presents with a chief complaint of Cavitary pneumonia (23 Sep 2024 14:43)        Over Night Events:    No events   No fevers       ROS:  See HPI    PHYSICAL EXAM    ICU Vital Signs Last 24 Hrs  T(C): 36.1 (24 Sep 2024 07:37), Max: 37.2 (23 Sep 2024 16:58)  T(F): 96.9 (24 Sep 2024 07:37), Max: 99 (23 Sep 2024 16:58)  HR: 79 (24 Sep 2024 07:37) (79 - 105)  BP: 113/58 (24 Sep 2024 07:37) (102/59 - 115/64)  BP(mean): 82 (23 Sep 2024 20:07) (82 - 82)  ABP: --  ABP(mean): --  RR: 16 (24 Sep 2024 07:37) (16 - 18)  SpO2: 100% (24 Sep 2024 07:37) (100% - 100%)    O2 Parameters below as of 24 Sep 2024 07:37  Patient On (Oxygen Delivery Method): room air            General: NAD   HEENT: JORDON             Lymphatic system: No cervical LN   Lungs: Bilateral BS, decreased left side   Cardiovascular: Regular   Gastrointestinal: Soft, Positive BS  Extremities: No clubbing.  Moves extremities.  Full Range of motion   Skin: Warm, intact  Neurological: No motor or sensory deficit         LABS:                            10.0   9.20  )-----------( 429      ( 24 Sep 2024 06:37 )             32.4                                               09-23    128[L]  |  97[L]  |  4[L]  ----------------------------<  86  4.4   |  23  |  0.5[L]    Ca    9.0      23 Sep 2024 12:11  Mg     2.0     09-23    TPro  6.9  /  Alb  3.6  /  TBili  0.2  /  DBili  x   /  AST  13  /  ALT  <5  /  AlkPhos  64  09-23      PT/INR - ( 23 Sep 2024 12:11 )   PT: 15.50 sec;   INR: 1.36 ratio         PTT - ( 23 Sep 2024 22:40 )  PTT:74.2 sec                                       Urinalysis Basic - ( 23 Sep 2024 12:11 )    Color: x / Appearance: x / SG: x / pH: x  Gluc: 86 mg/dL / Ketone: x  / Bili: x / Urobili: x   Blood: x / Protein: x / Nitrite: x   Leuk Esterase: x / RBC: x / WBC x   Sq Epi: x / Non Sq Epi: x / Bacteria: x                                                  LIVER FUNCTIONS - ( 23 Sep 2024 12:11 )  Alb: 3.6 g/dL / Pro: 6.9 g/dL / ALK PHOS: 64 U/L / ALT: <5 U/L / AST: 13 U/L / GGT: x                                                  Culture - Blood (collected 22 Sep 2024 23:40)  Source: .Blood Blood  Preliminary Report (24 Sep 2024 07:01):    No growth at 24 hours    Culture - Blood (collected 22 Sep 2024 23:40)  Source: .Blood Blood  Preliminary Report (24 Sep 2024 07:01):    No growth at 24 hours                                                                                           MEDICATIONS  (STANDING):  FLUoxetine 10 milliGRAM(s) Oral daily  heparin  Infusion.  Unit(s)/Hr (12 mL/Hr) IV Continuous <Continuous>  piperacillin/tazobactam IVPB.. 3.375 Gram(s) IV Intermittent every 8 hours    MEDICATIONS  (PRN):  heparin   Injectable 2500 Unit(s) IV Push every 6 hours PRN For aPTT between 40 - 57  heparin   Injectable 5500 Unit(s) IV Push every 6 hours PRN For aPTT less than 40

## 2024-09-24 NOTE — PROGRESS NOTE ADULT - SUBJECTIVE AND OBJECTIVE BOX
SUBJECTIVE/OVERNIGHT EVENTS  Today is hospital day 2d. This morning patient was seen and examined at bedside, resting comfortably in bed. No acute or major events overnight.    MEDICATIONS  STANDING MEDICATIONS  enoxaparin Injectable 70 milliGRAM(s) SubCutaneous every 12 hours  FLUoxetine 10 milliGRAM(s) Oral daily  piperacillin/tazobactam IVPB.. 3.375 Gram(s) IV Intermittent every 8 hours    PRN MEDICATIONS    VITALS  T(F): 98.6 (09-24-24 @ 16:32), Max: 98.6 (09-24-24 @ 16:32)  HR: 91 (09-24-24 @ 16:32) (79 - 96)  BP: 118/58 (09-24-24 @ 16:32) (113/58 - 118/58)  RR: 18 (09-24-24 @ 16:32) (16 - 18)  SpO2: 100% (09-24-24 @ 16:32) (100% - 100%)    PHYSICAL EXAM  GENERAL  ( x ) NAD, lying in bed comfortably     (  ) obtunded     (  ) lethargic     (  ) somnolent    HEAD  (  ) Atraumatic     (  ) hematoma     (  ) laceration (specify location:       )     NECK  (  ) Supple     (  ) neck stiffness     (  ) nuchal rigidity     (  )  no JVD     (  ) JVD present ( -- cm)    HEART  Rate -->  (  ) normal rate    (  ) bradycardic    (  ) tachycardic  Rhythm -->  (  ) regular    (  ) regularly irregular    (  ) irregularly irregular  Murmurs -->  (  ) normal s1/s2    (  ) systolic murmur    (  ) diastolic murmur    (  ) continuous murmur     (  ) S3 present    (  ) S4 present    LUNGS  (  )Unlabored respirations     (  ) tachypnea  (  ) B/L air entry     (  ) decreased breath sounds in:  (location     )    (  ) no adventitious sound     (  ) crackles     (  ) wheezing      (  ) rhonchi      (specify location:       )  (  ) chest wall tenderness (specify location:       )    ABDOMEN  (  ) Soft     (  ) tense   |   (  ) nondistended     (  ) distended   |   (  ) +BS     (  ) hypoactive bowel sounds     (  ) hyperactive bowel sounds  (  ) nontender     (  ) RUQ tenderness     (  ) RLQ tenderness     (  ) LLQ tenderness     (  ) epigastric tenderness     (  ) diffuse tenderness  (  ) Splenomegaly      (  ) Hepatomegaly      (  ) Jaundice     (  ) ecchymosis     EXTREMITIES  (  ) Normal     (  ) Rash     (  ) ecchymosis     (  ) varicose veins      (  ) pitting edema     (  ) non-pitting edema   (  ) ulceration     (  ) gangrene:     (location:     )    NERVOUS SYSTEM  (  ) A&Ox3     (  ) confused     (  ) lethargic  CN II-XII:     (  ) Intact     (  ) focal deficits  (Specify:     )   Upper extremities:     (  ) strength X/5     (  ) focal deficit (specify:    )  Lower extremities:     (  ) strength  X/5    (  ) focal deficit (specify:    )    SKIN  (  ) No rashes or lesions     (  ) maculopapular rash     (  ) pustules     (  ) vesicles     (  ) ulcer     (  ) ecchymosis     (specify location:     )    (  ) Indwelling Hall Catheter   Date insterted:    Reason (  ) Critical illness     (  ) urinary retention    (  ) Accurate Ins/Outs Monitoring     (  ) CMO patient    (  ) Central Line  Date inserted:  Location: (  ) Right IJ   (  ) Left IJ   (  ) Right Fem   (  ) Left Fem    (  ) SPC  (  ) pigtail  (  ) PEG tube  (  ) colostomy  (  ) jejunostomy  (  ) U-Dall    LABS             10.0   9.20  )-----------( 429      ( 09-24-24 @ 06:37 )             32.4     136  |  101  |  <3  -------------------------<  88   09-24-24 @ 06:37  4.2  |  20  |  <0.5    Ca      9.0     09-24-24 @ 06:37  Mg     1.9     09-24-24 @ 06:37    TPro  6.5  /  Alb  3.2  /  TBili  <0.2  /  DBili  x   /  AST  17  /  ALT  7   /  AlkPhos  63  /  GGT  x     09-24-24 @ 06:37    PT/INR - ( 09-23-24 @ 12:11 )   PT: 15.50 sec[H];   INR: 1.36 ratio[H]  PTT - ( 09-23-24 @ 22:40 )  PTT:74.2 sec    Troponin T, High Sensitivity Result: <6 ng/L (09-22-24 @ 21:38)  Pro-Brain Natriuretic Peptide: <36 pg/mL (09-22-24 @ 17:25)    Urinalysis Basic - ( 24 Sep 2024 06:37 )    Color: x / Appearance: x / SG: x / pH: x  Gluc: 88 mg/dL / Ketone: x  / Bili: x / Urobili: x   Blood: x / Protein: x / Nitrite: x   Leuk Esterase: x / RBC: x / WBC x   Sq Epi: x / Non Sq Epi: x / Bacteria: x          Culture - Acid Fast - Sputum w/Smear (collected 23 Sep 2024 07:30)  Source: .Sputum Sputum    Culture - Blood (collected 22 Sep 2024 23:40)  Source: .Blood Blood  Preliminary Report (24 Sep 2024 07:01):    No growth at 24 hours    Culture - Blood (collected 22 Sep 2024 23:40)  Source: .Blood Blood  Preliminary Report (24 Sep 2024 07:01):    No growth at 24 hours

## 2024-09-24 NOTE — PROGRESS NOTE ADULT - ASSESSMENT
Impression     Hx of PE unprovoked 2016 s/p treatment  now recurring   Hx of Cavitations and bronchiectasis found on OP imaging after PNA, lost to follow up in 2017/18  Saw Dr. Villa (per patient) 2021 lost to follow up during covid  Started seeing Dr. 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

## 2024-09-24 NOTE — PROGRESS NOTE ADULT - ATTENDING COMMENTS
45 year old F with PMHx of PE previously on eliquis, self weaned, neurofibromatosis, and migraines who presented to the ED on 9/23; she is complaining of cough, clear frothy sputum, and low grade temp x1d. She denies any other complaints.       IMPRESSION  Pulmonary  Embolism  -submassive   No Right heart strain   H/O PE . Off a/c since 2019  Hemodynamically Stable   Started on Hep Drip. Switched to therapeutic lovenox today  Hypercoagulable workup as outpt  Complex areas of bronchiectasis and cavitary lesions with superimposed consolidations within the left upper and lower lung fields.   Suspected cavitary MAC infection. Low suspicion for TB  Taken off airborne precautions by ID  Follow-up AFB sputum Cx collected   Repeat AFB sputum x 2    continue zosyn 3.375 mg q 8 hours for now   F/u blood culture and sputum culture  Follow Urine strep , legionella and  MRSA PCR, Procal  Remains medicably active for now

## 2024-09-24 NOTE — PROGRESS NOTE ADULT - SUBJECTIVE AND OBJECTIVE BOX
LUBA RODRIGUEZ  44y, Female  Allergy: No Known Allergies      LOS  2d    CHIEF COMPLAINT: Cavitary pneumonia (23 Sep 2024 14:43)      INTERVAL EVENTS/HPI  - No acute events overnight  - T(F): , Max: 99 (09-23-24 @ 16:58)  - cough has turned into green tinge which for her is at baseline   - WBC improving, no fevers   - WBC Count: 9.20 (09-24-24 @ 06:37)  WBC Count: 10.90 (09-23-24 @ 12:11)     - Creatinine: <0.5 (09-24-24 @ 06:37)  Creatinine: 0.5 (09-23-24 @ 12:11)       ROS  General: Denies rigors, nightsweats  HEENT: Denies headache, rhinorrhea, sore throat, eye pain  CV: Denies CP, palpitations  PULM: Denies wheezing, hemoptysis  GI: Denies hematemesis, hematochezia, melena  : Denies discharge, hematuria  MSK: Denies arthralgias, myalgias  SKIN: Denies rash, lesions  NEURO: Denies paresthesias, weakness  PSYCH: Denies depression, anxiety    VITALS:  T(F): 96.9, Max: 99 (09-23-24 @ 16:58)  HR: 79  BP: 113/58  RR: 16Vital Signs Last 24 Hrs  T(C): 36.1 (24 Sep 2024 07:37), Max: 37.2 (23 Sep 2024 16:58)  T(F): 96.9 (24 Sep 2024 07:37), Max: 99 (23 Sep 2024 16:58)  HR: 79 (24 Sep 2024 07:37) (79 - 105)  BP: 113/58 (24 Sep 2024 07:37) (102/59 - 115/64)  BP(mean): 82 (23 Sep 2024 20:07) (82 - 82)  RR: 16 (24 Sep 2024 07:37) (16 - 18)  SpO2: 100% (24 Sep 2024 07:37) (100% - 100%)    Parameters below as of 24 Sep 2024 07:37  Patient On (Oxygen Delivery Method): room air        PHYSICAL EXAM:  Gen: NAD, resting in bed  HEENT: Normocephalic, atraumatic  Neck: supple, no lymphadenopathy  CV: Regular rate & regular rhythm  Lungs: decreased BS at bases, no fremitus  Abdomen: Soft, BS present  Ext: Warm, well perfused  Neuro: non focal, awake  Skin: no rash, no erythema  Lines: no phlebitis    FH: Non-contributory  Social Hx: Non-contributory    TESTS & MEASUREMENTS:                        10.0   9.20  )-----------( 429      ( 24 Sep 2024 06:37 )             32.4     09-24    136  |  101  |  <3[L]  ----------------------------<  88  4.2   |  20  |  <0.5[L]    Ca    9.0      24 Sep 2024 06:37  Mg     1.9     09-24    TPro  6.5  /  Alb  3.2[L]  /  TBili  <0.2  /  DBili  x   /  AST  17  /  ALT  7   /  AlkPhos  63  09-24      LIVER FUNCTIONS - ( 24 Sep 2024 06:37 )  Alb: 3.2 g/dL / Pro: 6.5 g/dL / ALK PHOS: 63 U/L / ALT: 7 U/L / AST: 17 U/L / GGT: x           Urinalysis Basic - ( 24 Sep 2024 06:37 )    Color: x / Appearance: x / SG: x / pH: x  Gluc: 88 mg/dL / Ketone: x  / Bili: x / Urobili: x   Blood: x / Protein: x / Nitrite: x   Leuk Esterase: x / RBC: x / WBC x   Sq Epi: x / Non Sq Epi: x / Bacteria: x        Culture - Blood (collected 09-22-24 @ 23:40)  Source: .Blood Blood  Preliminary Report (09-24-24 @ 07:01):    No growth at 24 hours    Culture - Blood (collected 09-22-24 @ 23:40)  Source: .Blood Blood  Preliminary Report (09-24-24 @ 07:01):    No growth at 24 hours        Lactate, Blood: 1.7 mmol/L (09-23-24 @ 12:11)  Lactate, Blood: 1.0 mmol/L (09-22-24 @ 20:26)      INFECTIOUS DISEASES TESTING  Procalcitonin: 0.08 (09-23-24 @ 12:11)  MRSA PCR Result.: Negative (09-23-24 @ 11:25)      INFLAMMATORY MARKERS      RADIOLOGY & ADDITIONAL TESTS:  I have personally reviewed the last available Chest xray  CXR  Xray Chest 1 View- PORTABLE-Urgent:   ACC: [id number] EXAM:  XR CHEST PORTABLE URGENT 1V   ORDERED BY: YOUNG MCFARLAND     PROCEDURE DATE:  09/22/2024          INTERPRETATION:  Clinical History / Reason for exam: Shortness of breath    Comparison : Chest radiograph correlation with chest CT 9/22/2024.    Technique/Positioning: Single AP chest radiograph.    Findings:    Support devices: None.    Cardiac/mediastinum/hilum: Partially obscured    Lung parenchyma/Pleura: Left upper lobe cavitary lesion is not well   visualized on this exam. Left lower lobe opacities and cavitary lesions   are similar compared to recent CT. Elevation of the left hemidiaphragm.   The right lung appears clear. No pneumothorax.    Skeleton/soft tissues: Scoliotic curvature of the spine and degenerative   changes.    Impression:    Similar appearance of left lung opacities and cavitary lesions compared   with concurrent CT chest. Elevation of the left hemidiaphragm.    --- End of Report ---            LYDIA CHAU MD; Attending Radiologist  This document has been electronically signed. Sep 23 2024  9:34AM (09-22-24 @ 19:22)      CT  CT Angio Chest PE Protocol w/ IV Cont:   ACC: [id number] EXAM:  CT ANGIO CHEST PULM [facility]   ORDERED BY: YOUNG MCFARLAND     PROCEDURE DATE:  09/22/2024          INTERPRETATION:  CLINICAL INFORMATION: Shortness of breath    COMPARISON: CT CHEST 5/7/2024.    CONTRAST/COMPLICATIONS:  IV Contrast: Omnipaque 350  65 cc administered   35 cc discarded  Oral Contrast: NONE  Complications: None reported at time of study completion    PROCEDURE:  CT Angiography of the Chest.  Sagittal and coronal reformats were performed as well as 3D (MIP)   reconstructions.    FINDINGS:    LUNGS AND LARGE AIRWAYS: Scattered complex areas of bronchiectasis with   superimposed cavitary lesions and consolidations within the left lung.   Additional cavitary lesions and consolidations within the right upper and   right lower lobe.  PLEURA: Trace left pleural effusion  VESSELS: Pulmonary embolus within the distal left main pulmonary artery.  HEART: Heart size is normal. No pericardial effusion.  MEDIASTINUM AND ANA: Multiple enlarged prevascular, paratracheal and   subcarinal lymph nodes, likely reactive  CHEST WALL AND LOWER NECK: Left lower lobe hypodensity  VISUALIZED UPPER ABDOMEN: Within normal limits.  BONES: Within normal limits.    IMPRESSION:  Pulmonary embolus within the distal left main pulmonary artery. No CTA   evidence of right heart strain.    Complex areas of bronchiectasis and cavitary lesions with superimposed   consolidations within the left upper and lower lung fields.    Additional consolidative opacities within the right upper and lower lobes      Message sent to YOUNG BANUELOS DO [phone]; Attending Emergency Medicine   via secure MS Teams chat on 9/22/2024 8:30 PM with readback.    Callback could not be created given technical issues.    --- End of Report ---            DARLENE MUKHERJEE MD; Attending Radiologist  This document has been electronically signed. Sep 22 2024  8:32PM (09-22-24 @ 20:16)      CARDIOLOGY TESTING  12 Lead ECG:   Ventricular Rate 121 BPM    Atrial Rate 121 BPM    P-R Interval 174 ms    QRS Duration 84 ms    Q-T Interval 306 ms    QTC Calculation(Bazett) 434 ms    P Axis 67 degrees    R Axis -19 degrees    T Axis 67 degrees    Diagnosis Line Sinus tachycardia  Possible Left atrial enlargement  Borderline ECG    Confirmed by Alfredo Miller ([id number]) on 9/22/2024 5:36:59 PM (09-22-24 @ 16:47)      MEDICATIONS  FLUoxetine 10 Oral daily  heparin  Infusion.  IV Continuous <Continuous>  piperacillin/tazobactam IVPB.. 3.375 IV Intermittent every 8 hours      WEIGHT  Weight (kg): 66.2 (09-22-24 @ 16:53)  Creatinine: <0.5 mg/dL (09-24-24 @ 06:37)  Creatinine: 0.5 mg/dL (09-23-24 @ 12:11)      ANTIBIOTICS:  piperacillin/tazobactam IVPB.. 3.375 Gram(s) IV Intermittent every 8 hours      All available historical records have been reviewed

## 2024-09-24 NOTE — ED ADULT NURSE REASSESSMENT NOTE - NS ED NURSE REASSESS COMMENT FT1
Received report from SANIA Camargo. Pt AO4, in no acute distress. Respirations even & unlabored, on RA. Cardiac monitoring maintained. Comfort and safety measures maintained

## 2024-09-24 NOTE — PROGRESS NOTE ADULT - ASSESSMENT
45 year old F with PMHx of PE previously on eliquis, self weaned, neurofibromatosis, and migraines who presented to the ED on 9/23; she is complaining of cough, clear frothy sputum, and low grade temp x1d. She denies any other complaints.       IMPRESSION  Pulmonary  Embolus  -submassive   No Right heart strain   H/O PE . Off a/c since 2019  Hemodynamically Stable   Started on Hep Drip. Pulm recs appreciated. switching to therapeutic lovenox at 18:00 on 9/24/24. Heparin d/c'd around 14:00.  autoimmune workup ordered for AM as per pulm recs  LE duplex ordered as per pulm recs  Hypercoagulable workup as outpt  Complex areas of bronchiectasis and cavitary lesions with superimposed consolidations within the left upper and lower lung fields. F/u ID. ID note appreciated. Taking patient off of airborne isolation per their permission.  Quantiferon negative in 2018  Follow-up AFB sputum Cx collected on 9/23/24 but not on 9/24/24.  Repeat AFB sputum here x 2  -would need 2 positive Cx to establish diagnosis   continue zosyn 3.375 mg q 8 hours for now   F/u blood culture and sputum culture  Follow Urine strep , legionella and  MRSA PCR, Procal  Remains medicably active for now

## 2024-09-25 LAB
ALBUMIN SERPL ELPH-MCNC: 3.6 G/DL — SIGNIFICANT CHANGE UP (ref 3.5–5.2)
ALP SERPL-CCNC: 67 U/L — SIGNIFICANT CHANGE UP (ref 30–115)
ALT FLD-CCNC: 6 U/L — SIGNIFICANT CHANGE UP (ref 0–41)
ANION GAP SERPL CALC-SCNC: 18 MMOL/L — HIGH (ref 7–14)
AST SERPL-CCNC: 12 U/L — SIGNIFICANT CHANGE UP (ref 0–41)
BILIRUB SERPL-MCNC: <0.2 MG/DL — SIGNIFICANT CHANGE UP (ref 0.2–1.2)
BUN SERPL-MCNC: 4 MG/DL — LOW (ref 10–20)
CALCIUM SERPL-MCNC: 8.8 MG/DL — SIGNIFICANT CHANGE UP (ref 8.4–10.5)
CHLORIDE SERPL-SCNC: 103 MMOL/L — SIGNIFICANT CHANGE UP (ref 98–110)
CO2 SERPL-SCNC: 19 MMOL/L — SIGNIFICANT CHANGE UP (ref 17–32)
CREAT SERPL-MCNC: 0.7 MG/DL — SIGNIFICANT CHANGE UP (ref 0.7–1.5)
CRP SERPL-MCNC: 56.5 MG/L — HIGH
DEPRECATED M TB RPOB XXX QL NAA+PROBE: SIGNIFICANT CHANGE UP
EGFR: 109 ML/MIN/1.73M2 — SIGNIFICANT CHANGE UP
ERYTHROCYTE [SEDIMENTATION RATE] IN BLOOD: 80 MM/HR — HIGH (ref 0–20)
GAMMA INTERFERON BACKGROUND BLD IA-ACNC: 0.03 IU/ML — SIGNIFICANT CHANGE UP
GLUCOSE SERPL-MCNC: 88 MG/DL — SIGNIFICANT CHANGE UP (ref 70–99)
HCT VFR BLD CALC: 32.6 % — LOW (ref 37–47)
HGB BLD-MCNC: 10.1 G/DL — LOW (ref 12–16)
LEGIONELLA AG UR QL: NEGATIVE — SIGNIFICANT CHANGE UP
M TB CMPLX DNA SPT/BRO QL NAA+PROBE: SIGNIFICANT CHANGE UP
M TB CMPLX DNA SPT/BRO QL NAA+PROBE: SIGNIFICANT CHANGE UP
M TB IFN-G BLD-IMP: NEGATIVE — SIGNIFICANT CHANGE UP
M TB IFN-G CD4+ BCKGRND COR BLD-ACNC: 0 IU/ML — SIGNIFICANT CHANGE UP
M TB IFN-G CD4+CD8+ BCKGRND COR BLD-ACNC: 0.01 IU/ML — SIGNIFICANT CHANGE UP
MCHC RBC-ENTMCNC: 22.6 PG — LOW (ref 27–31)
MCHC RBC-ENTMCNC: 31 G/DL — LOW (ref 32–37)
MCV RBC AUTO: 73.1 FL — LOW (ref 81–99)
MTB RIF RES GENE SPT NAA+PROBE: SIGNIFICANT CHANGE UP
NIGHT BLUE STAIN TISS: ABNORMAL
NRBC # BLD: 0 /100 WBCS — SIGNIFICANT CHANGE UP (ref 0–0)
PLATELET # BLD AUTO: 484 K/UL — HIGH (ref 130–400)
PMV BLD: 9.9 FL — SIGNIFICANT CHANGE UP (ref 7.4–10.4)
POTASSIUM SERPL-MCNC: 4.2 MMOL/L — SIGNIFICANT CHANGE UP (ref 3.5–5)
POTASSIUM SERPL-SCNC: 4.2 MMOL/L — SIGNIFICANT CHANGE UP (ref 3.5–5)
PROT C ACT/NOR PPP: 72 % — SIGNIFICANT CHANGE UP (ref 65–129)
PROT SERPL-MCNC: 7 G/DL — SIGNIFICANT CHANGE UP (ref 6–8)
QUANT TB PLUS MITOGEN MINUS NIL: 2.8 IU/ML — SIGNIFICANT CHANGE UP
RBC # BLD: 4.46 M/UL — SIGNIFICANT CHANGE UP (ref 4.2–5.4)
RBC # FLD: 14.8 % — HIGH (ref 11.5–14.5)
S PNEUM AG UR QL: NEGATIVE — SIGNIFICANT CHANGE UP
SODIUM SERPL-SCNC: 140 MMOL/L — SIGNIFICANT CHANGE UP (ref 135–146)
SPECIMEN SOURCE: SIGNIFICANT CHANGE UP
WBC # BLD: 10.53 K/UL — SIGNIFICANT CHANGE UP (ref 4.8–10.8)
WBC # FLD AUTO: 10.53 K/UL — SIGNIFICANT CHANGE UP (ref 4.8–10.8)

## 2024-09-25 PROCEDURE — 99232 SBSQ HOSP IP/OBS MODERATE 35: CPT

## 2024-09-25 RX ORDER — ALBUTEROL 90 MCG
2 AEROSOL (GRAM) INHALATION EVERY 6 HOURS
Refills: 0 | Status: DISCONTINUED | OUTPATIENT
Start: 2024-09-25 | End: 2024-09-27

## 2024-09-25 RX ADMIN — ENOXAPARIN SODIUM 70 MILLIGRAM(S): 150 INJECTION SUBCUTANEOUS at 05:12

## 2024-09-25 RX ADMIN — Medication 2 PUFF(S): at 11:25

## 2024-09-25 RX ADMIN — PIPERACILLIN SODIUM AND TAZOBACTAM SODIUM 25 GRAM(S): 12; 1.5 INJECTION, POWDER, LYOPHILIZED, FOR SOLUTION INTRAVENOUS at 21:35

## 2024-09-25 RX ADMIN — Medication 10 MILLIGRAM(S): at 11:26

## 2024-09-25 RX ADMIN — PIPERACILLIN SODIUM AND TAZOBACTAM SODIUM 25 GRAM(S): 12; 1.5 INJECTION, POWDER, LYOPHILIZED, FOR SOLUTION INTRAVENOUS at 15:16

## 2024-09-25 RX ADMIN — ENOXAPARIN SODIUM 70 MILLIGRAM(S): 150 INJECTION SUBCUTANEOUS at 17:17

## 2024-09-25 RX ADMIN — PIPERACILLIN SODIUM AND TAZOBACTAM SODIUM 25 GRAM(S): 12; 1.5 INJECTION, POWDER, LYOPHILIZED, FOR SOLUTION INTRAVENOUS at 05:12

## 2024-09-25 NOTE — PROGRESS NOTE ADULT - SUBJECTIVE AND OBJECTIVE BOX
SUBJECTIVE/OVERNIGHT EVENTS  Today is hospital day 3d. This morning patient was seen and examined at bedside, resting comfortably in bed. No acute or major events overnight. Patient reports that feels a little short of breath today.    MEDICATIONS  STANDING MEDICATIONS  enoxaparin Injectable 70 milliGRAM(s) SubCutaneous every 12 hours  FLUoxetine 10 milliGRAM(s) Oral daily  piperacillin/tazobactam IVPB.. 3.375 Gram(s) IV Intermittent every 8 hours    PRN MEDICATIONS  albuterol    90 MICROgram(s) HFA Inhaler 2 Puff(s) Inhalation every 6 hours PRN    VITALS  T(F): 97.6 (09-25-24 @ 14:03), Max: 98.6 (09-24-24 @ 16:32)  HR: 75 (09-25-24 @ 14:03) (75 - 104)  BP: 99/63 (09-25-24 @ 14:03) (99/63 - 118/58)  RR: 18 (09-25-24 @ 14:03) (16 - 18)  SpO2: 98% (09-25-24 @ 05:16) (98% - 100%)    PHYSICAL EXAM  GENERAL  ( x ) NAD, lying in bed comfortably     (  ) obtunded     (  ) lethargic     (  ) somnolent    HEAD  (  ) Atraumatic     (  ) hematoma     (  ) laceration (specify location:       )     NECK  (  ) Supple     (  ) neck stiffness     (  ) nuchal rigidity     (  )  no JVD     (  ) JVD present ( -- cm)    HEART  Rate -->  (  ) normal rate    (  ) bradycardic    (  ) tachycardic  Rhythm -->  (  ) regular    (  ) regularly irregular    (  ) irregularly irregular  Murmurs -->  (  ) normal s1/s2    (  ) systolic murmur    (  ) diastolic murmur    (  ) continuous murmur     (  ) S3 present    (  ) S4 present    LUNGS  (  )Unlabored respirations     (  ) tachypnea  (  ) B/L air entry     (  ) decreased breath sounds in:  (location     )    (  ) no adventitious sound     (  ) crackles     (  ) wheezing      (  ) rhonchi      (specify location:       )  (  ) chest wall tenderness (specify location:       )  Clear to ausculatation bilaterally. No wheezing.    ABDOMEN  (  ) Soft     (  ) tense   |   (  ) nondistended     (  ) distended   |   (  ) +BS     (  ) hypoactive bowel sounds     (  ) hyperactive bowel sounds  (  ) nontender     (  ) RUQ tenderness     (  ) RLQ tenderness     (  ) LLQ tenderness     (  ) epigastric tenderness     (  ) diffuse tenderness  (  ) Splenomegaly      (  ) Hepatomegaly      (  ) Jaundice     (  ) ecchymosis     EXTREMITIES  (  ) Normal     (  ) Rash     (  ) ecchymosis     (  ) varicose veins      (  ) pitting edema     (  ) non-pitting edema   (  ) ulceration     (  ) gangrene:     (location:     )    NERVOUS SYSTEM  (  ) A&Ox3     (  ) confused     (  ) lethargic  CN II-XII:     (  ) Intact     (  ) focal deficits  (Specify:     )   Upper extremities:     (  ) strength X/5     (  ) focal deficit (specify:    )  Lower extremities:     (  ) strength  X/5    (  ) focal deficit (specify:    )    SKIN  (  ) No rashes or lesions     (  ) maculopapular rash     (  ) pustules     (  ) vesicles     (  ) ulcer     (  ) ecchymosis     (specify location:     )    (  ) Indwelling Hall Catheter   Date insterted:    Reason (  ) Critical illness     (  ) urinary retention    (  ) Accurate Ins/Outs Monitoring     (  ) CMO patient    (  ) Central Line  Date inserted:  Location: (  ) Right IJ   (  ) Left IJ   (  ) Right Fem   (  ) Left Fem    (  ) SPC  (  ) pigtail  (  ) PEG tube  (  ) colostomy  (  ) jejunostomy  (  ) U-Dall    LABS             10.1   10.53 )-----------( 484      ( 09-25-24 @ 08:06 )             32.6     140  |  103  |  4   -------------------------<  88   09-25-24 @ 08:06  4.2  |  19  |  0.7    Ca      8.8     09-25-24 @ 08:06  Mg     1.9     09-24-24 @ 06:37    TPro  7.0  /  Alb  3.6  /  TBili  <0.2  /  DBili  x   /  AST  12  /  ALT  6   /  AlkPhos  67  /  GGT  x     09-25-24 @ 08:06    PTT - ( 09-23-24 @ 22:40 )  PTT:74.2 sec    Troponin T, High Sensitivity Result: <6 ng/L (09-22-24 @ 21:38)  Pro-Brain Natriuretic Peptide: <36 pg/mL (09-22-24 @ 17:25)    Urinalysis Basic - ( 25 Sep 2024 08:06 )    Color: x / Appearance: x / SG: x / pH: x  Gluc: 88 mg/dL / Ketone: x  / Bili: x / Urobili: x   Blood: x / Protein: x / Nitrite: x   Leuk Esterase: x / RBC: x / WBC x   Sq Epi: x / Non Sq Epi: x / Bacteria: x          Culture - Acid Fast - Sputum w/Smear (collected 23 Sep 2024 07:30)  Source: .Sputum Sputum    Culture - Blood (collected 22 Sep 2024 23:40)  Source: .Blood Blood  Preliminary Report (25 Sep 2024 07:00):    No growth at 48 Hours    Culture - Blood (collected 22 Sep 2024 23:40)  Source: .Blood Blood  Preliminary Report (25 Sep 2024 07:00):    No growth at 48 Hours      IMAGING

## 2024-09-25 NOTE — PROGRESS NOTE ADULT - ATTENDING COMMENTS
seen this morning  felt fine  discussed with resident  no chest pain/sob    Vitals stable  cta b/l    labs reviewed    a/p    PE  Pulmonary infiltrate of unclear etiology treated as infectious   -plan as outlined above

## 2024-09-26 LAB
ANA PAT FLD IF-IMP: ABNORMAL
ANA TITR SER: ABNORMAL
ANION GAP SERPL CALC-SCNC: 12 MMOL/L — SIGNIFICANT CHANGE UP (ref 7–14)
BUN SERPL-MCNC: 4 MG/DL — LOW (ref 10–20)
CALCIUM SERPL-MCNC: 9.2 MG/DL — SIGNIFICANT CHANGE UP (ref 8.4–10.5)
CCP IGG SERPL-ACNC: <8 U/ML — SIGNIFICANT CHANGE UP
CHLORIDE SERPL-SCNC: 103 MMOL/L — SIGNIFICANT CHANGE UP (ref 98–110)
CO2 SERPL-SCNC: 22 MMOL/L — SIGNIFICANT CHANGE UP (ref 17–32)
CREAT SERPL-MCNC: 0.6 MG/DL — LOW (ref 0.7–1.5)
DSDNA AB FLD-ACNC: <0.2 AI — SIGNIFICANT CHANGE UP
EGFR: 113 ML/MIN/1.73M2 — SIGNIFICANT CHANGE UP
ENA SCL70 AB SER-ACNC: <0.2 AI — SIGNIFICANT CHANGE UP
ENA SS-A AB FLD IA-ACNC: <0.2 AI — SIGNIFICANT CHANGE UP
GLUCOSE SERPL-MCNC: 86 MG/DL — SIGNIFICANT CHANGE UP (ref 70–99)
HCT VFR BLD CALC: 33.4 % — LOW (ref 37–47)
HGB BLD-MCNC: 10.3 G/DL — LOW (ref 12–16)
MCHC RBC-ENTMCNC: 22.6 PG — LOW (ref 27–31)
MCHC RBC-ENTMCNC: 30.8 G/DL — LOW (ref 32–37)
MCV RBC AUTO: 73.2 FL — LOW (ref 81–99)
MPO AB + PR3 PNL SER: SIGNIFICANT CHANGE UP
NRBC # BLD: 0 /100 WBCS — SIGNIFICANT CHANGE UP (ref 0–0)
PLATELET # BLD AUTO: 468 K/UL — HIGH (ref 130–400)
PMV BLD: 10.1 FL — SIGNIFICANT CHANGE UP (ref 7.4–10.4)
POTASSIUM SERPL-MCNC: 4.4 MMOL/L — SIGNIFICANT CHANGE UP (ref 3.5–5)
POTASSIUM SERPL-SCNC: 4.4 MMOL/L — SIGNIFICANT CHANGE UP (ref 3.5–5)
PROT S FREE PPP-ACNC: 88 % — SIGNIFICANT CHANGE UP (ref 63–140)
RBC # BLD: 4.56 M/UL — SIGNIFICANT CHANGE UP (ref 4.2–5.4)
RBC # FLD: 14.6 % — HIGH (ref 11.5–14.5)
RF+CCP IGG SER-IMP: NEGATIVE — SIGNIFICANT CHANGE UP
RHEUMATOID FACT SERPL-ACNC: <10 IU/ML — SIGNIFICANT CHANGE UP (ref 0–13)
SODIUM SERPL-SCNC: 137 MMOL/L — SIGNIFICANT CHANGE UP (ref 135–146)
WBC # BLD: 10.9 K/UL — HIGH (ref 4.8–10.8)
WBC # FLD AUTO: 10.9 K/UL — HIGH (ref 4.8–10.8)

## 2024-09-26 PROCEDURE — 93970 EXTREMITY STUDY: CPT | Mod: 26

## 2024-09-26 PROCEDURE — 99232 SBSQ HOSP IP/OBS MODERATE 35: CPT

## 2024-09-26 RX ORDER — CHLORHEXIDINE GLUCONATE ORAL RINSE 1.2 MG/ML
1 SOLUTION DENTAL DAILY
Refills: 0 | Status: DISCONTINUED | OUTPATIENT
Start: 2024-09-26 | End: 2024-09-27

## 2024-09-26 RX ORDER — AZITHROMYCIN 250 MG/1
500 TABLET, FILM COATED ORAL DAILY
Refills: 0 | Status: DISCONTINUED | OUTPATIENT
Start: 2024-09-27 | End: 2024-09-27

## 2024-09-26 RX ORDER — CEFPODOXIME PROXETIL 50 MG/5 ML
200 SUSPENSION, RECONSTITUTED, ORAL (ML) ORAL EVERY 12 HOURS
Refills: 0 | Status: DISCONTINUED | OUTPATIENT
Start: 2024-09-26 | End: 2024-09-27

## 2024-09-26 RX ORDER — ETHAMBUTOL HYDROCHLORIDE 400 MG/1
1000 TABLET, FILM COATED ORAL DAILY
Refills: 0 | Status: DISCONTINUED | OUTPATIENT
Start: 2024-09-27 | End: 2024-09-27

## 2024-09-26 RX ADMIN — ENOXAPARIN SODIUM 70 MILLIGRAM(S): 150 INJECTION SUBCUTANEOUS at 05:20

## 2024-09-26 RX ADMIN — ENOXAPARIN SODIUM 70 MILLIGRAM(S): 150 INJECTION SUBCUTANEOUS at 17:24

## 2024-09-26 RX ADMIN — Medication 10 MILLIGRAM(S): at 11:07

## 2024-09-26 RX ADMIN — PIPERACILLIN SODIUM AND TAZOBACTAM SODIUM 25 GRAM(S): 12; 1.5 INJECTION, POWDER, LYOPHILIZED, FOR SOLUTION INTRAVENOUS at 05:20

## 2024-09-26 RX ADMIN — Medication 200 MILLIGRAM(S): at 15:31

## 2024-09-26 RX ADMIN — CHLORHEXIDINE GLUCONATE ORAL RINSE 1 APPLICATION(S): 1.2 SOLUTION DENTAL at 17:25

## 2024-09-26 NOTE — PROGRESS NOTE ADULT - ASSESSMENT
ASSESSMENT  Patient is a 45 year old F with PMHx of PE previously on eliquis, self weaned, neurofibromatosis, depression, and migraines who presented to the ED on 9/23    IMPRESSION  #Cavitary Lesions, Chronic -- possible MAC infection   - Bronch 8/2018 - bacterial, fungal, AFB Cx negative   - Followed by [facility] Pulm outpatient -- Sputum Cx 5/31/2024 MAC, Candida lusitaniae --> recommended for ID evaluation as outpatient, has appointment in Oct 2024  - AFB 9/23 smear negative  - AFB 9/25 smear positive, TB PCR negative     #Superimposed bacterial pneumonia   - CT Angio Chest PE Protocol w/ IV Cont (09.22.24 @ 20:16): Pulmonary embolus within the distal left main pulmonary artery. No CTA  evidence of right heart strain.Complex areas of bronchiectasis and cavitary lesions with superimposed  consolidations within the left upper and lower lung fields. Additional consolidative opacities within the right upper and lower lobes  - WBC Count: 17.45 K/uL (09.22.24 @ 17:25)  - MRSA nares negative     #Neurofibromatosis   #Depression    #Obesity BMI (kg/m2): 25  #Abx allergy: No Known Allergies    RECOMMENDATIONS  - noted AFB stain positive -- suspect this will be MAC   - tomorrow, can start Azithromycin 500 mg daily, Ethambutol 15 mg/kg (about 1000mg) PO   - has appointment with ID on 10/2 -- eventually will need to add rifampin/rifabutin and inhaled amikacin   - continue zosyn 3.375 mg q 8 hours for now -- switch to PO vantin 200 mg BID end date 9/28     Please call or message on Microsoft Teams if with any questions.  Spectra [phone]

## 2024-09-26 NOTE — PROGRESS NOTE ADULT - ASSESSMENT
45 year old F with PMHx of PE previously on eliquis, self weaned, neurofibromatosis, and migraines who presented to the ED on 9/23; she is complaining of cough, clear frothy sputum, and low grade temp x1d. She denies any other complaints except today felt a little short of breath.    A/P:   Left lung Cavitary lesions, chronic: possible MAC with Superimposed bacterial infection  Bronch 8/2018 - bacterial, fungal, AFB Cx negative   Sputum cx positive for MAC in 2018  CT chest: Complex areas of bronchiectasis and cavitary lesions with superimposed consolidations within the left upper and lower lung fields. Additional consolidative opacities within the right upper and lower lobes  AFB sputum cultures x3 - AFB positive stain, mycobacterium tuberculosis negative  c/w zosyn 3.375 q8, switch to Vantin 200mg BID, end on 9/28 per ID    Acute Pulmonary  Embolus:   CT chest angio: Pulmonary embolus within the distal left main pulmonary artery. No CTA evidence of right heart strain.  H/O PE . Off a/c since 2019  Hemodynamically Stable   On Lovenox switch to Eliquis  Autoimmune workup requested by pulm -neg; RAMOS still pending  LE duplex clear      DVT prophylaxis:   Pending: final blood and sputum cultures

## 2024-09-26 NOTE — PROGRESS NOTE ADULT - SUBJECTIVE AND OBJECTIVE BOX
LUBA RODRIGUEZ  44y, Female  Allergy: No Known Allergies      LOS  4d    CHIEF COMPLAINT: Cavitary pneumonia (23 Sep 2024 14:43)      INTERVAL EVENTS/HPI  - No acute events overnight  - T(F): , Max: 98.9 (09-25-24 @ 20:07)  - Acid fast with AFB on stain   - WBC Count: 10.90 (09-26-24 @ 06:50)  WBC Count: 10.53 (09-25-24 @ 08:06)     - Creatinine: 0.6 (09-26-24 @ 06:50)  Creatinine: 0.7 (09-25-24 @ 08:06)       ROS  General: Denies rigors, nightsweats  HEENT: Denies headache, rhinorrhea, sore throat, eye pain  CV: Denies CP, palpitations  PULM: Denies wheezing, hemoptysis  GI: Denies hematemesis, hematochezia, melena  : Denies discharge, hematuria  MSK: Denies arthralgias, myalgias  SKIN: Denies rash, lesions  NEURO: Denies paresthesias, weakness  PSYCH: Denies depression, anxiety    VITALS:  T(F): 97.9, Max: 98.9 (09-25-24 @ 20:07)  HR: 77  BP: 106/67  RR: 18Vital Signs Last 24 Hrs  T(C): 36.6 (26 Sep 2024 05:06), Max: 37.2 (25 Sep 2024 20:07)  T(F): 97.9 (26 Sep 2024 05:06), Max: 98.9 (25 Sep 2024 20:07)  HR: 77 (26 Sep 2024 05:06) (77 - 87)  BP: 106/67 (26 Sep 2024 05:06) (96/62 - 106/67)  BP(mean): --  RR: 18 (26 Sep 2024 05:06) (18 - 18)  SpO2: 100% (26 Sep 2024 08:13) (97% - 100%)    Parameters below as of 26 Sep 2024 08:13  Patient On (Oxygen Delivery Method): room air        PHYSICAL EXAM:  Gen: NAD, resting in bed  HEENT: Normocephalic, atraumatic  Neck: supple, no lymphadenopathy  CV: Regular rate & regular rhythm  Lungs: decreased BS at bases, no fremitus  Abdomen: Soft, BS present  Ext: Warm, well perfused  Neuro: non focal, awake  Skin: no rash, no erythema  Lines: no phlebitis    FH: Non-contributory  Social Hx: Non-contributory    TESTS & MEASUREMENTS:                        10.3   10.90 )-----------( 468      ( 26 Sep 2024 06:50 )             33.4     09-26    137  |  103  |  4[L]  ----------------------------<  86  4.4   |  22  |  0.6[L]    Ca    9.2      26 Sep 2024 06:50    TPro  7.0  /  Alb  3.6  /  TBili  <0.2  /  DBili  x   /  AST  12  /  ALT  6   /  AlkPhos  67  09-25      LIVER FUNCTIONS - ( 25 Sep 2024 08:06 )  Alb: 3.6 g/dL / Pro: 7.0 g/dL / ALK PHOS: 67 U/L / ALT: 6 U/L / AST: 12 U/L / GGT: x           Urinalysis Basic - ( 26 Sep 2024 06:50 )    Color: x / Appearance: x / SG: x / pH: x  Gluc: 86 mg/dL / Ketone: x  / Bili: x / Urobili: x   Blood: x / Protein: x / Nitrite: x   Leuk Esterase: x / RBC: x / WBC x   Sq Epi: x / Non Sq Epi: x / Bacteria: x        Culture - Acid Fast - Sputum w/Smear (collected 09-25-24 @ 07:19)  Source: .Sputum Sputum  Preliminary Report (09-25-24 @ 20:31):    Culture is being performed.    Culture - Acid Fast - Sputum w/Smear (collected 09-23-24 @ 07:30)  Source: .Sputum Sputum  Preliminary Report (09-25-24 @ 23:06):    Culture is being performed.    Culture - Blood (collected 09-22-24 @ 23:40)  Source: .Blood Blood  Preliminary Report (09-26-24 @ 07:01):    No growth at 72 Hours    Culture - Blood (collected 09-22-24 @ 23:40)  Source: .Blood Blood  Preliminary Report (09-26-24 @ 07:01):    No growth at 72 Hours        Lactate, Blood: 1.7 mmol/L (09-23-24 @ 12:11)  Lactate, Blood: 1.0 mmol/L (09-22-24 @ 20:26)      INFECTIOUS DISEASES TESTING  Legionella Antigen, Urine: Negative (09-24-24 @ 17:28)  Procalcitonin: 0.08 (09-23-24 @ 12:11)  MRSA PCR Result.: Negative (09-23-24 @ 11:25)      INFLAMMATORY MARKERS  Sedimentation Rate, Erythrocyte: 80 mm/Hr (09-25-24 @ 08:06)  C-Reactive Protein: 56.5 mg/L (09-25-24 @ 08:06)      RADIOLOGY & ADDITIONAL TESTS:  I have personally reviewed the last available Chest xray  CXR      CT      CARDIOLOGY TESTING  12 Lead ECG:   Ventricular Rate 96 BPM    Atrial Rate 96 BPM    P-R Interval 184 ms    QRS Duration 88 ms    Q-T Interval 360 ms    QTC Calculation(Bazett) 454 ms    P Axis 64 degrees    R Axis 24 degrees    T Axis 63 degrees    Diagnosis Line Normal sinus rhythm  Normal ECG    Confirmed by Devin Hatch ([id number]) on 9/24/2024 9:49:40 PM (09-22-24 @ 18:33)  12 Lead ECG:   Ventricular Rate 121 BPM    Atrial Rate 121 BPM    P-R Interval 174 ms    QRS Duration 84 ms    Q-T Interval 306 ms    QTC Calculation(Bazett) 434 ms    P Axis 67 degrees    R Axis -19 degrees    T Axis 67 degrees    Diagnosis Line Sinus tachycardia  Possible Left atrial enlargement  Borderline ECG    Confirmed by Alfredo Miller ([id number]) on 9/22/2024 5:36:59 PM (09-22-24 @ 16:47)      MEDICATIONS  enoxaparin Injectable 70 SubCutaneous every 12 hours  FLUoxetine 10 Oral daily  piperacillin/tazobactam IVPB.. 3.375 IV Intermittent every 8 hours      WEIGHT  Weight (kg): 70.5 (09-26-24 @ 03:37)  Creatinine: 0.6 mg/dL (09-26-24 @ 06:50)      ANTIBIOTICS:  piperacillin/tazobactam IVPB.. 3.375 Gram(s) IV Intermittent every 8 hours      All available historical records have been reviewed

## 2024-09-26 NOTE — PROGRESS NOTE ADULT - PROVIDER SPECIALTY LIST ADULT
Hospitalist
Infectious Disease
Infectious Disease
Internal Medicine
Internal Medicine
Hospitalist
Internal Medicine
Pulmonology
Infectious Disease

## 2024-09-26 NOTE — PROGRESS NOTE ADULT - SUBJECTIVE AND OBJECTIVE BOX
SUBJECTIVE/OVERNIGHT EVENTS  Today is hospital day 4d. This morning patient was seen and examined at bedside, resting comfortably in bed. No acute or major events overnight.    HOSPITAL COURSE  Day 1:   Day 2:   Day 3:     CODE STATUS:    FAMILY COMMUNICATION  Contact date:  Name of person contacted:  Relationship to patient:  Communication details:    MEDICATIONS  STANDING MEDICATIONS  cefpodoxime 200 milliGRAM(s) Oral every 12 hours  enoxaparin Injectable 70 milliGRAM(s) SubCutaneous every 12 hours  FLUoxetine 10 milliGRAM(s) Oral daily    PRN MEDICATIONS  albuterol    90 MICROgram(s) HFA Inhaler 2 Puff(s) Inhalation every 6 hours PRN    VITALS  T(F): 97.9 (09-26-24 @ 05:06), Max: 98.9 (09-25-24 @ 20:07)  HR: 77 (09-26-24 @ 05:06) (77 - 87)  BP: 106/67 (09-26-24 @ 05:06) (96/62 - 106/67)  RR: 18 (09-26-24 @ 05:06) (18 - 18)  SpO2: 100% (09-26-24 @ 08:13) (97% - 100%)    PHYSICAL EXAM  GENERAL: NAD, lying in bed comfortably  HEART: Regular rate and rhythm, no murmurs, rubs, or gallops  LUNGS: Unlabored respirations.  Clear to auscultation bilaterally, no crackles, wheezing, or rhonchi  ABDOMEN: Soft, nontender, nondistended  EXTREMITIES: No clubbing, cyanosis, or edema  NERVOUS SYSTEM:  A&Ox3  SKIN: No rashes or lesions    LABS             10.3   10.90 )-----------( 468      ( 09-26-24 @ 06:50 )             33.4     137  |  103  |  4   -------------------------<  86   09-26-24 @ 06:50  4.4  |  22  |  0.6    Ca      9.2     09-26-24 @ 06:50    TPro  7.0  /  Alb  3.6  /  TBili  <0.2  /  DBili  x   /  AST  12  /  ALT  6   /  AlkPhos  67  /  GGT  x     09-25-24 @ 08:06        Urinalysis Basic - ( 26 Sep 2024 06:50 )    Color: x / Appearance: x / SG: x / pH: x  Gluc: 86 mg/dL / Ketone: x  / Bili: x / Urobili: x   Blood: x / Protein: x / Nitrite: x   Leuk Esterase: x / RBC: x / WBC x   Sq Epi: x / Non Sq Epi: x / Bacteria: x          Culture - Acid Fast - Sputum w/Smear (collected 25 Sep 2024 07:19)  Source: .Sputum Sputum  Preliminary Report (25 Sep 2024 20:31):    Culture is being performed.      IMAGING

## 2024-09-26 NOTE — PROGRESS NOTE ADULT - SUBJECTIVE AND OBJECTIVE BOX
JENNIFERJOELUBA  44y  Female      Patient is a 44y old  Female who presents with a chief complaint of Cavitary pneumonia (23 Sep 2024 14:43)      INTERVAL HPI/OVERNIGHT EVENTS:  She feels better, no chest pain or fever.   Vital Signs Last 24 Hrs  T(C): 36.6 (26 Sep 2024 05:06), Max: 37.2 (25 Sep 2024 20:07)  T(F): 97.9 (26 Sep 2024 05:06), Max: 98.9 (25 Sep 2024 20:07)  HR: 77 (26 Sep 2024 05:06) (77 - 87)  BP: 106/67 (26 Sep 2024 05:06) (96/62 - 106/67)  BP(mean): --  RR: 18 (26 Sep 2024 05:06) (18 - 18)  SpO2: 100% (26 Sep 2024 08:13) (97% - 100%)    Parameters below as of 26 Sep 2024 08:13  Patient On (Oxygen Delivery Method): room air          09-25-24 @ 07:01  -  09-26-24 @ 07:00  --------------------------------------------------------  IN: 300 mL / OUT: 300 mL / NET: 0 mL            Consultant(s) Notes Reviewed:  [x ] YES  [ ] NO          MEDICATIONS  (STANDING):  cefpodoxime 200 milliGRAM(s) Oral every 12 hours  chlorhexidine 2% Cloths 1 Application(s) Topical daily  enoxaparin Injectable 70 milliGRAM(s) SubCutaneous every 12 hours  FLUoxetine 10 milliGRAM(s) Oral daily    MEDICATIONS  (PRN):  albuterol    90 MICROgram(s) HFA Inhaler 2 Puff(s) Inhalation every 6 hours PRN Shortness of Breath and/or Wheezing      LABS                          10.3   10.90 )-----------( 468      ( 26 Sep 2024 06:50 )             33.4     09-26    137  |  103  |  4[L]  ----------------------------<  86  4.4   |  22  |  0.6[L]    Ca    9.2      26 Sep 2024 06:50    TPro  7.0  /  Alb  3.6  /  TBili  <0.2  /  DBili  x   /  AST  12  /  ALT  6   /  AlkPhos  67  09-25      Urinalysis Basic - ( 26 Sep 2024 06:50 )    Color: x / Appearance: x / SG: x / pH: x  Gluc: 86 mg/dL / Ketone: x  / Bili: x / Urobili: x   Blood: x / Protein: x / Nitrite: x   Leuk Esterase: x / RBC: x / WBC x   Sq Epi: x / Non Sq Epi: x / Bacteria: x        Lactate Trend  09-23 @ 12:11 Lactate:1.7   09-22 @ 20:26 Lactate:1.0         CAPILLARY BLOOD GLUCOSE          Culture - Acid Fast - Sputum w/Smear (collected 09-25-24 @ 07:19)  Source: .Sputum Sputum  Preliminary Report (09-25-24 @ 20:31):    Culture is being performed.    Culture - Acid Fast - Sputum w/Smear (collected 09-23-24 @ 07:30)  Source: .Sputum Sputum  Preliminary Report (09-25-24 @ 23:06):    Culture is being performed.    Culture - Blood (collected 09-22-24 @ 23:40)  Source: .Blood Blood  Preliminary Report (09-26-24 @ 07:01):    No growth at 72 Hours    Culture - Blood (collected 09-22-24 @ 23:40)  Source: .Blood Blood  Preliminary Report (09-26-24 @ 07:01):    No growth at 72 Hours        RADIOLOGY & ADDITIONAL TESTS:    Imaging Personally Reviewed:  [ ] YES  [ ] NO    HEALTH ISSUES - PROBLEM Dx:          PHYSICAL EXAM:  GENERAL: NAD, well-developed.  HEAD:  Atraumatic, Normocephalic.  EYES: EOMI, PERRLA, conjunctiva and sclera clear.  NECK: Supple, No JVD.  CHEST/LUNG: left lung bronchial breathing.   HEART: Regular rate and rhythm; S1 S2.   ABDOMEN: Soft, Nontender, Nondistended; Bowel sounds present.  EXTREMITIES:  2+ Peripheral Pulses, No clubbing, cyanosis, or edema.  PSYCH: AAOx3.  NEUROLOGY: non-focal.  SKIN: No rashes or lesions.

## 2024-09-26 NOTE — PROGRESS NOTE ADULT - TIME BILLING
I have personally seen and examined this patient.    I have reviewed all pertinent clinical information and reviewed all relevant imaging and diagnostic studies personally.   I counseled the patient about diagnostic testing and treatment plan. All questions were answered.   I discussed recommendations with the primary team.
I have personally seen and examined this patient.    I have reviewed all pertinent clinical information and reviewed all relevant imaging and diagnostic studies personally.   I counseled the patient about diagnostic testing and treatment plan. All questions were answered.   I discussed recommendations with the primary team.
4

## 2024-09-26 NOTE — PROGRESS NOTE ADULT - ASSESSMENT
45 year old F with PMHx of PE previously on eliquis, self weaned, neurofibromatosis, and migraines who presented to the ED on 9/23; she is complaining of cough, clear frothy sputum, and low grade temp x1d. She denies any other complaints except today felt a little short of breath.      IMPRESSION  #Pulmonary  Embolus - L main pulm atery  No Right heart strain   H/O PE . Off a/c since 2019  Hemodynamically Stable   Hep drip -> theurapeutic lovenox  Autoimmune workup requested by pulm --- Specimens received by performing department --- F/U final results.  LE duplex ordered as per pulm recs --- not yet performed  Hypercoagulable workup as outpt    #Cavitary lesions, chronic - possible MAC  #Consolidations - superimposed bacterial infection  - Bronch 8/2018 - bacterial, fungal, AFB Cx negative   - Followed by [facility] Pulm outpatient -- Sputum Cx 5/31/2024 MAC, Candida lusitaniae --> recommended for ID evaluation as outpatient, has appointment in Oct 2024  AFB sputum cultures x2  f/u bacterial sputum culture  c/w zosyn 3.375 q8  per ID, low suspicion of Tb, off airborn precautions      Dispo: 3C  Pending: Duplex LE, 3rd acid fast sputum tomorrow AM (ensure that collected!!!), final blood and sputum cultures, urine study results, autoimmune workup results 45 year old F with PMHx of PE previously on eliquis, self weaned, neurofibromatosis, and migraines who presented to the ED on 9/23; she is complaining of cough, clear frothy sputum, and low grade temp x1d. She denies any other complaints except today felt a little short of breath.    #Pulmonary  Embolus - L main pulm atery  No Right heart strain   H/O PE . Off a/c since 2019  Hemodynamically Stable   Hep drip -> theurapeutic lovenox  Autoimmune workup requested by pulm -neg; RAMOS still pending  LE duplex clear    #Cavitary lesions, chronic - possible MAC  #Consolidations - superimposed bacterial infection  - Bronch 8/2018 - bacterial, fungal, AFB Cx negative   - Followed by [facility] Pulm outpatient -- Sputum Cx 5/31/2024 MAC, Candida lusitaniae --> recommended for ID evaluation as outpatient, has appointment in Oct 2024  AFB sputum cultures x3 - AFB positive stain  f/u bacterial sputum culture  c/w zosyn 3.375 q8  per ID, low suspicion of Tb, off airborn precautions      Dispo: off tele  Pending: final blood and sputum cultures

## 2024-09-27 ENCOUNTER — TRANSCRIPTION ENCOUNTER (OUTPATIENT)
Age: 44
End: 2024-09-27

## 2024-09-27 VITALS
HEART RATE: 73 BPM | TEMPERATURE: 98 F | SYSTOLIC BLOOD PRESSURE: 100 MMHG | RESPIRATION RATE: 18 BRPM | DIASTOLIC BLOOD PRESSURE: 64 MMHG

## 2024-09-27 LAB
ALBUMIN SERPL ELPH-MCNC: 3.6 G/DL — SIGNIFICANT CHANGE UP (ref 3.5–5.2)
ALP SERPL-CCNC: 73 U/L — SIGNIFICANT CHANGE UP (ref 30–115)
ALT FLD-CCNC: 12 U/L — SIGNIFICANT CHANGE UP (ref 0–41)
ANION GAP SERPL CALC-SCNC: 13 MMOL/L — SIGNIFICANT CHANGE UP (ref 7–14)
AST SERPL-CCNC: 30 U/L — SIGNIFICANT CHANGE UP (ref 0–41)
BASOPHILS # BLD AUTO: 0.1 K/UL — SIGNIFICANT CHANGE UP (ref 0–0.2)
BASOPHILS NFR BLD AUTO: 0.9 % — SIGNIFICANT CHANGE UP (ref 0–1)
BILIRUB SERPL-MCNC: <0.2 MG/DL — SIGNIFICANT CHANGE UP (ref 0.2–1.2)
BUN SERPL-MCNC: 7 MG/DL — LOW (ref 10–20)
CALCIUM SERPL-MCNC: 9.1 MG/DL — SIGNIFICANT CHANGE UP (ref 8.4–10.4)
CHLORIDE SERPL-SCNC: 100 MMOL/L — SIGNIFICANT CHANGE UP (ref 98–110)
CO2 SERPL-SCNC: 22 MMOL/L — SIGNIFICANT CHANGE UP (ref 17–32)
CREAT SERPL-MCNC: 0.6 MG/DL — LOW (ref 0.7–1.5)
EGFR: 113 ML/MIN/1.73M2 — SIGNIFICANT CHANGE UP
EOSINOPHIL # BLD AUTO: 0.42 K/UL — SIGNIFICANT CHANGE UP (ref 0–0.7)
EOSINOPHIL NFR BLD AUTO: 3.7 % — SIGNIFICANT CHANGE UP (ref 0–8)
GLUCOSE SERPL-MCNC: 85 MG/DL — SIGNIFICANT CHANGE UP (ref 70–99)
HCT VFR BLD CALC: 32.6 % — LOW (ref 37–47)
HGB BLD-MCNC: 10 G/DL — LOW (ref 12–16)
IMM GRANULOCYTES NFR BLD AUTO: 0.4 % — HIGH (ref 0.1–0.3)
LYMPHOCYTES # BLD AUTO: 27.8 % — SIGNIFICANT CHANGE UP (ref 20.5–51.1)
LYMPHOCYTES # BLD AUTO: 3.14 K/UL — SIGNIFICANT CHANGE UP (ref 1.2–3.4)
MAGNESIUM SERPL-MCNC: 2 MG/DL — SIGNIFICANT CHANGE UP (ref 1.8–2.4)
MCHC RBC-ENTMCNC: 22.5 PG — LOW (ref 27–31)
MCHC RBC-ENTMCNC: 30.7 G/DL — LOW (ref 32–37)
MCV RBC AUTO: 73.3 FL — LOW (ref 81–99)
MONOCYTES # BLD AUTO: 1.1 K/UL — HIGH (ref 0.1–0.6)
MONOCYTES NFR BLD AUTO: 9.7 % — HIGH (ref 1.7–9.3)
NEUTROPHILS # BLD AUTO: 6.48 K/UL — SIGNIFICANT CHANGE UP (ref 1.4–6.5)
NEUTROPHILS NFR BLD AUTO: 57.5 % — SIGNIFICANT CHANGE UP (ref 42.2–75.2)
NRBC # BLD: 0 /100 WBCS — SIGNIFICANT CHANGE UP (ref 0–0)
PLATELET # BLD AUTO: 462 K/UL — HIGH (ref 130–400)
PMV BLD: 9.9 FL — SIGNIFICANT CHANGE UP (ref 7.4–10.4)
POTASSIUM SERPL-MCNC: 4.4 MMOL/L — SIGNIFICANT CHANGE UP (ref 3.5–5)
POTASSIUM SERPL-SCNC: 4.4 MMOL/L — SIGNIFICANT CHANGE UP (ref 3.5–5)
PROT SERPL-MCNC: 7.2 G/DL — SIGNIFICANT CHANGE UP (ref 6–8)
RBC # BLD: 4.45 M/UL — SIGNIFICANT CHANGE UP (ref 4.2–5.4)
RBC # FLD: 14.6 % — HIGH (ref 11.5–14.5)
SODIUM SERPL-SCNC: 135 MMOL/L — SIGNIFICANT CHANGE UP (ref 135–146)
WBC # BLD: 11.29 K/UL — HIGH (ref 4.8–10.8)
WBC # FLD AUTO: 11.29 K/UL — HIGH (ref 4.8–10.8)

## 2024-09-27 PROCEDURE — 99239 HOSP IP/OBS DSCHRG MGMT >30: CPT | Mod: GC

## 2024-09-27 RX ORDER — APIXABAN 5 MG/1
1 TABLET, FILM COATED ORAL
Qty: 60 | Refills: 3
Start: 2024-09-27 | End: 2025-01-24

## 2024-09-27 RX ORDER — CEFPODOXIME PROXETIL 50 MG/5 ML
1 SUSPENSION, RECONSTITUTED, ORAL (ML) ORAL
Qty: 3 | Refills: 0
Start: 2024-09-27 | End: 2024-09-28

## 2024-09-27 RX ORDER — ETHAMBUTOL HYDROCHLORIDE 400 MG/1
10 TABLET, FILM COATED ORAL
Qty: 300 | Refills: 0
Start: 2024-09-27 | End: 2024-10-26

## 2024-09-27 RX ORDER — AZITHROMYCIN 250 MG/1
1 TABLET, FILM COATED ORAL
Qty: 30 | Refills: 0
Start: 2024-09-27 | End: 2024-10-26

## 2024-09-27 RX ADMIN — AZITHROMYCIN 500 MILLIGRAM(S): 250 TABLET, FILM COATED ORAL at 11:51

## 2024-09-27 RX ADMIN — CHLORHEXIDINE GLUCONATE ORAL RINSE 1 APPLICATION(S): 1.2 SOLUTION DENTAL at 11:55

## 2024-09-27 RX ADMIN — Medication 200 MILLIGRAM(S): at 05:14

## 2024-09-27 RX ADMIN — Medication 10 MILLIGRAM(S): at 11:51

## 2024-09-27 RX ADMIN — ETHAMBUTOL HYDROCHLORIDE 1000 MILLIGRAM(S): 400 TABLET, FILM COATED ORAL at 11:52

## 2024-09-27 RX ADMIN — ENOXAPARIN SODIUM 70 MILLIGRAM(S): 150 INJECTION SUBCUTANEOUS at 05:15

## 2024-09-27 NOTE — DISCHARGE NOTE PROVIDER - CARE PROVIDER_API CALL
Keegan Montano  Family Medicine  [address] [place] [address]  Phone: [phone]  Fax: [phone]  Established Patient  Follow Up Time: 1 month    Marvin Abarca  Pulmonary Disease  [address] [place] [address]  Phone: [phone]  Fax: [phone]  Follow Up Time: 1 month    Karl Hernandez  Infectious Disease  [address] [place] [address]  Phone: [phone]  Fax: [phone]  Follow Up Time: 1 week

## 2024-09-27 NOTE — DISCHARGE NOTE PROVIDER - NSDCCPCAREPLAN_GEN_ALL_CORE_FT
PRINCIPAL DISCHARGE DIAGNOSIS  Diagnosis: Pulmonary embolism  Assessment and Plan of Treatment: 45 year old F with PMHx of PE previously on eliquis, self weaned, neurofibromatosis, chronic lung cavitary lesions, and migraines who presented to the ED on 9/23; she is complaining of cough, clear frothy sputum, and low grade temp x1d. She denies any other complaints except today felt a little short of breath.CTA of the lungs showed a PE of the L main pulmonary artery with no right heart strain. Pt was hemodynamically stable. LE duplex clear. She was started on a heparin drip, switched to therapeutic lovenox, will be discharged on eliquis.  You came to the ER because of a cough, low temperature and some trouble breathing. you were found to have a clot in your lungs. You were started on blood thinner medication. From now on you need to take blood thinners every day to prevent future clot formation.  We have prescribed you "Eliquis". Please take as follows:  2 tablets (10 miligrams) twice a day for 7 days.  Then switch to 1 tablet (5 miligrams) twice a day every day.  Please follow up with your primary doctor  and your pulmonogist regarding your hospital stay.      SECONDARY DISCHARGE DIAGNOSES  Diagnosis: Pulmonary Mycobacterium avium infection  Assessment and Plan of Treatment: A CT scan showed you have chronic cavitationsand pneumonia in your lungs. A culture of your sputum grew a type of bacteria called mycobacterium avium. You were given antibiotics in the hospital. You are also beiing discharged with antibiotics.  Please take the following:  Vantin 200mg twice a day until September 28th  Azithromycin 500mg daily every day for now.  Ethambutol 1000mg daily every day for now.  Please follow up with the infectious disease doctor at your appointment on October 2nd regarding if to continue Azithromycin and Ethambutol or to switch to a different medication.  Please also follow up with your pulmonologist regarding your lung cavitations and the need for a follow up CT scan.

## 2024-09-27 NOTE — DISCHARGE NOTE NURSING/CASE MANAGEMENT/SOCIAL WORK - PATIENT PORTAL LINK FT
You can access the FollowMyHealth Patient Portal offered by [facility] by registering at the following website: [email or web address]. By joining [employer]’s FollowMyHealth portal, you will also be able to view your health information using other applications (apps) compatible with our system.

## 2024-09-27 NOTE — DISCHARGE NOTE PROVIDER - ATTENDING DISCHARGE PHYSICAL EXAMINATION:
PHYSICAL EXAM:  GENERAL: NAD, well-developed.  HEAD:  Atraumatic, Normocephalic.  EYES: EOMI, PERRLA, conjunctiva and sclera clear.  NECK: Supple, No JVD.  CHEST/LUNG: left lung bronchial breathing.   HEART: Regular rate and rhythm; S1 S2.   ABDOMEN: Soft, Nontender, Nondistended; Bowel sounds present.  EXTREMITIES:  2+ Peripheral Pulses, No clubbing, cyanosis, or edema.  PSYCH: AAOx3.  NEUROLOGY: non-focal.  SKIN: No rashes or lesions.

## 2024-09-27 NOTE — DISCHARGE NOTE PROVIDER - NSDCQMAMI_CARD_ALL_CORE
Patient presents to ED with 7 day Chest pain/ burning to the left side, radiating to the left arm pit and left scapula. C/o SOB  
No

## 2024-09-27 NOTE — DISCHARGE NOTE PROVIDER - CARE PROVIDERS DIRECT ADDRESSES
[email or web address]

## 2024-09-27 NOTE — DISCHARGE NOTE PROVIDER - HOSPITAL COURSE
45 year old F with PMHx of PE previously on eliquis, self weaned, neurofibromatosis, chronic lung cavitary lesions, and migraines who presented to the ED on 9/23; she is complaining of cough, clear frothy sputum, and low grade temp x1d. She denies any other complaints except today felt a little short of breath.CTA of the lungs showed a PE of the L main pulmonary artery with no right heart strain. Pt was hemodynamically stable. LE duplex clear. She was started on a heparin drip, switched to therapeutic lovenox, will be discharged on eliquis.    CT also showed chronic cavitary lesions of the lungs and consolidations.   CT Angio Chest PE Protocol w/ IV Cont (09.22.24 @ 20:16): Pulmonary embolus within the distal left main pulmonary artery. No CTA  evidence of right heart strain.Complex areas of bronchiectasis and cavitary lesions with superimposed  consolidations within the left upper and lower lung fields. Additional consolidative opacities within the right upper and lower lobes  Pt was started on zosyn.  Sputum cultures 8/2018 were positive for MAC. 9/25/2024 cultures during this admission grew MAC as well.   ID follwed pt. Pt was started on Azithromycin 500mg qd and ethambutol 1000mg qd to take daily. Pt was scheduled for ID outpt appt on 10/2. Per ID, may need to switch to rifampin/rifabutin + inhaled amikacin later on after discharge.  Zosyn was switched to PO vantin to take until 9/28 after discharge.      Pt was examined at bedside. Pt is stable and ready for discharge home. ID f/u appt sched 10/2.   45 year old F with PMHx of PE previously on eliquis, self weaned, neurofibromatosis, chronic lung cavitary lesions, and migraines who presented to the ED on 9/23; she is complaining of cough, clear frothy sputum, and low grade temp x1d. She denies any other complaints except today felt a little short of breath.CTA of the lungs showed a PE of the L main pulmonary artery with no right heart strain. Pt was hemodynamically stable. LE duplex clear. She was started on a heparin drip, switched to therapeutic lovenox, will be discharged on eliquis.    CT also showed chronic cavitary lesions of the lungs and consolidations.   CT Angio Chest PE Protocol w/ IV Cont (09.22.24 @ 20:16): Pulmonary embolus within the distal left main pulmonary artery. No CTA  evidence of right heart strain.Complex areas of bronchiectasis and cavitary lesions with superimposed  consolidations within the left upper and lower lung fields. Additional consolidative opacities within the right upper and lower lobes  Pt was started on zosyn.  Sputum cultures 8/2018 were positive for MAC. 9/25/2024 cultures during this admission grew MAC as well.   ID follwed pt. Pt was started on Azithromycin 500mg qd and ethambutol 1000mg qd to take daily. Pt was scheduled for ID outpt appt on 10/2. Per ID, may need to switch to rifampin/rifabutin + inhaled amikacin later on after discharge.  Zosyn was switched to PO vantin to take until 9/28 after discharge.      Pt was examined at bedside. Pt is stable and ready for discharge home. ID f/u appt sched 10/2.    Left lung Cavitary lesions, chronic: possible MAC with Superimposed bacterial infection  Bronch 8/2018 - bacterial, fungal, AFB Cx negative   Sputum cx positive for MAC in 2018  CT chest: Complex areas of bronchiectasis and cavitary lesions with superimposed consolidations within the left upper and lower lung fields. Additional consolidative opacities within the right upper and lower lobes  AFB sputum cultures x3 - AFB positive stain, mycobacterium tuberculosis negative  ID recommended to start on MAC treatment with Azithromycin 500 mg daily, Ethambutol 15 mg/kg (about 1000mg) PO daily.   s/p zosyn 3.375 q8, switch to Vantin 200mg BID, end on 9/28 per ID    Acute Pulmonary  Embolus:   CT chest angio: Pulmonary embolus within the distal left main pulmonary artery. No CTA evidence of right heart strain.  H/O PE . Off a/c since 2019  Hemodynamically Stable   On Lovenox switch to Eliquis  Autoimmune workup  LE duplex clear

## 2024-09-27 NOTE — DISCHARGE NOTE PROVIDER - NSDCMRMEDTOKEN_GEN_ALL_CORE_FT
azithromycin 500 mg oral tablet: 1 tab(s) orally once a day  cefpodoxime 200 mg oral tablet: 1 tab(s) orally every 12 hours  Eliquis 5 mg oral tablet: 1 tab(s) orally 2 times a day  ethambutol 100 mg oral tablet: 10 tab(s) orally once a day  FLUoxetine (Eqv-PROzac) 10 mg oral tablet: 1 tab(s) orally once a day  nebulizer machine/ compressor: use as needed for asthma

## 2024-09-27 NOTE — DISCHARGE NOTE PROVIDER - PROVIDER TOKENS
PROVIDER:[TOKEN:[[id number]],FOLLOWUP:[1 month],ESTABLISHEDPATIENT:[T]],PROVIDER:[TOKEN:[[id number]],FOLLOWUP:[1 month]],PROVIDER:[TOKEN:[[id number]],FOLLOWUP:[1 week]]

## 2024-09-27 NOTE — DISCHARGE NOTE NURSING/CASE MANAGEMENT/SOCIAL WORK - NSDCPEFALRISK_GEN_ALL_CORE
For information on Fall & Injury Prevention, visit: [email or web address] OR  [email or web address] OR  https://www.cdc.gov/steadi/patient.html

## 2024-09-28 LAB
CULTURE RESULTS: SIGNIFICANT CHANGE UP
CULTURE RESULTS: SIGNIFICANT CHANGE UP
SPECIMEN SOURCE: SIGNIFICANT CHANGE UP
SPECIMEN SOURCE: SIGNIFICANT CHANGE UP

## 2024-09-30 LAB
JAK2 EXON 13 MUT ANL BLD/T: SIGNIFICANT CHANGE UP
REFLEX:: SIGNIFICANT CHANGE UP

## 2024-10-02 RX ORDER — RIFABUTIN 150 MG/1
150 CAPSULE ORAL DAILY
Qty: 60 | Refills: 1 | Status: ACTIVE | COMMUNITY
Start: 2024-10-02 | End: 1900-01-01

## 2024-10-02 RX ORDER — AMIKACIN 590 MG/8.4ML
590 SUSPENSION RESPIRATORY (INHALATION) DAILY
Qty: 1 | Refills: 1 | Status: ACTIVE | COMMUNITY
Start: 2024-10-02 | End: 1900-01-01

## 2024-10-02 RX ORDER — ETHAMBUTOL HYDROCHLORIDE 100 MG/1
100 TABLET ORAL DAILY
Qty: 300 | Refills: 1 | Status: ACTIVE | COMMUNITY
Start: 2024-10-02 | End: 1900-01-01

## 2024-10-02 RX ORDER — AZITHROMYCIN 500 MG/1
500 TABLET, FILM COATED ORAL DAILY
Qty: 30 | Refills: 1 | Status: ACTIVE | COMMUNITY
Start: 2024-10-02 | End: 1900-01-01

## 2024-10-03 RX ORDER — BLOOD-GLUCOSE METER
KIT MISCELLANEOUS
Qty: 1 | Refills: 0 | Status: ACTIVE | COMMUNITY
Start: 2024-10-03 | End: 1900-01-01

## 2024-10-04 LAB — NIGHT BLUE STAIN TISS: ABNORMAL

## 2024-10-09 DIAGNOSIS — A31.2 DISSEMINATED MYCOBACTERIUM AVIUM-INTRACELLULARE COMPLEX (DMAC): ICD-10-CM

## 2024-10-09 DIAGNOSIS — J15.9 UNSPECIFIED BACTERIAL PNEUMONIA: ICD-10-CM

## 2024-10-09 DIAGNOSIS — F32.A DEPRESSION, UNSPECIFIED: ICD-10-CM

## 2024-10-09 DIAGNOSIS — I26.99 OTHER PULMONARY EMBOLISM WITHOUT ACUTE COR PULMONALE: ICD-10-CM

## 2024-10-09 DIAGNOSIS — A31.0 PULMONARY MYCOBACTERIAL INFECTION: ICD-10-CM

## 2024-10-09 DIAGNOSIS — J47.0 BRONCHIECTASIS WITH ACUTE LOWER RESPIRATORY INFECTION: ICD-10-CM

## 2024-10-09 DIAGNOSIS — E66.9 OBESITY, UNSPECIFIED: ICD-10-CM

## 2024-10-09 DIAGNOSIS — Z79.01 LONG TERM (CURRENT) USE OF ANTICOAGULANTS: ICD-10-CM

## 2024-10-09 DIAGNOSIS — Q85.00 NEUROFIBROMATOSIS, UNSPECIFIED: ICD-10-CM

## 2024-11-19 ENCOUNTER — APPOINTMENT (OUTPATIENT)
Dept: PULMONOLOGY | Facility: CLINIC | Age: 44
End: 2024-11-19

## 2024-12-23 ENCOUNTER — RX RENEWAL (OUTPATIENT)
Age: 44
End: 2024-12-23

## 2025-05-06 NOTE — DISCHARGE NOTE ADULT - NS MD DC FALL RISK RISK
61 yo M with PMH of hemorrhoids who presents to the [facility] with CC of anemia. Was sent to the ED last evening by PCP after blood results return showing a Hgb 6.3. Pt saw PCP originally yesterday for fatigue, IVY, and SOB over past several weeks. Wife notes that she now also looks ashen in color. Pt denies chest pain, syncope, fever, vomiting, melena, hematochezia, hematemesis. Not on any blood thinners. Left AMA from [facility] last evening due to the wait. The history is provided by the patient. Abnormal Lab Results   Associated symptoms include shortness of breath (IVY). Pertinent negatives include no chest pain, no abdominal pain and no headaches.         Past Medical History:   Diagnosis Date    Chronic pain     Coagulation disorder (HCC)     HX ANEMIA    GERD (gastroesophageal reflux disease)     OCCASIONAL    Sleep apnea     CPAP       Past Surgical History:   Procedure Laterality Date    HX COLONOSCOPY      HX ENDOSCOPY      HX HEENT      WISDOM TEETH    HX HIP REPLACEMENT Right 2016    total         Family History:   Problem Relation Age of Onset    Heart Disease Mother         CHF    Hypertension Mother     Lung Disease Father         COPD    Alcohol abuse Father     Heart Disease Sister         VALVE REPLACEMENT    Heart Attack Maternal Grandmother     Cancer Maternal Grandfather         COLON    Heart Attack Paternal Grandmother     Heart Attack Paternal Grandfather     Anesth Problems Neg Hx        Social History     Socioeconomic History    Marital status:      Spouse name: Not on file    Number of children: Not on file    Years of education: Not on file    Highest education level: Not on file   Social Needs    Financial resource strain: Not on file    Food insecurity - worry: Not on file    Food insecurity - inability: Not on file   [employer] needs - medical: Not on file   [employer] needs - non-medical: Not on file   Occupational History    Not on file Tobacco Use    Smoking status: Never Smoker    Smokeless tobacco: Never Used   Substance and Sexual Activity    Alcohol use: Yes     Comment: occassionally    Drug use: No    Sexual activity: Not on file   Other Topics Concern    Not on file   Social History Narrative    Not on file         ALLERGIES: Patient has no known allergies. Review of Systems   Constitutional: Positive for fatigue. Negative for chills and fever. Eyes: Negative for pain. Respiratory: Positive for shortness of breath (IVY). Negative for cough. Cardiovascular: Negative for chest pain. Gastrointestinal: Negative for abdominal pain, anal bleeding, blood in stool (no Melena) and vomiting. Genitourinary: Negative for hematuria. Skin: Negative for rash. Neurological: Negative for syncope and headaches. Psychiatric/Behavioral: Negative for confusion. All other systems reviewed and are negative. Vitals:    02/06/19 1230   BP: 140/71   Pulse: 74   Resp: 16   Temp: 98.2 °F (36.8 °C)   SpO2: 98%   Weight: 110.4 kg (243 lb 6.2 oz)   Height: 5' 10\" (1.778 m)            Physical Exam   Constitutional: He is oriented to person, place, and time. He appears well-developed. No distress. HENT:   Head: Normocephalic and atraumatic. Eyes: Conjunctivae and EOM are normal.   Neck: Normal range of motion. Neck supple. Cardiovascular: Normal rate, regular rhythm and normal heart sounds. Pulmonary/Chest: Effort normal and breath sounds normal. No respiratory distress. Abdominal: Soft. There is no tenderness. There is no guarding. Genitourinary:   Genitourinary Comments: External hemorrhoid noted, no bleeding. Normal tone. No stool in rectal vault (could not perform occult blood/guiuac. No active bleeding. Musculoskeletal: Normal range of motion. He exhibits no edema. Neurological: He is alert and oriented to person, place, and time. He exhibits normal muscle tone. Skin: Skin is warm and dry. There is pallor. Vitals reviewed. MDM       Procedures    Hospitalist Reed for Admission  1:07 PM    ED Room Number: SER08/08  Patient Name and age:  Allie Bridges 62 y.o.  male  Working Diagnosis:   1. Symptomatic anemia      Readmission: no  Isolation Requirements:  no  Recommended Level of Care:  med/surg  Code Status:  Full Code  Other:  Hgb 6.0 today, left AMA from [facility] ED last night due to wait, OK with admission. Still pending stool sample for guaiac (no stool was in rectal vault on my exam for testing)       EKG at 1419: NSR, HR 61 bpm, no STEMI, no acute ischemia, normal axis, normal intervals. For information on Fall & Injury Prevention, visit [email or web address] [Scoliosis] : Scoliosis [No spinal deformity, fracture, lytic lesion, or marked single level collapse] : No spinal deformity, fracture, lytic lesion, or marked single level collapse [No instability seen on flexion/extension] : No instability seen on flexion/extension [AP] : anteroposterior [Mild arthritis (Tonnis Grade 1)] : Mild arthritis (Tonnis Grade 1)

## 2025-05-08 ENCOUNTER — EMERGENCY (EMERGENCY)
Facility: HOSPITAL | Age: 45
LOS: 0 days | Discharge: HOME | End: 2025-05-08
Attending: EMERGENCY MEDICINE
Payer: MEDICAID

## 2025-05-08 VITALS
HEART RATE: 80 BPM | RESPIRATION RATE: 16 BRPM | DIASTOLIC BLOOD PRESSURE: 64 MMHG | SYSTOLIC BLOOD PRESSURE: 104 MMHG | OXYGEN SATURATION: 100 %

## 2025-05-08 VITALS — TEMPERATURE: 98 F

## 2025-05-08 LAB
ALBUMIN SERPL ELPH-MCNC: 4.1 G/DL — SIGNIFICANT CHANGE UP (ref 3.5–5.2)
ALP SERPL-CCNC: 67 U/L — SIGNIFICANT CHANGE UP (ref 30–115)
ALT FLD-CCNC: 9 U/L — SIGNIFICANT CHANGE UP (ref 0–41)
ANION GAP SERPL CALC-SCNC: 12 MMOL/L — SIGNIFICANT CHANGE UP (ref 7–14)
AST SERPL-CCNC: 31 U/L — SIGNIFICANT CHANGE UP (ref 0–41)
BASOPHILS # BLD AUTO: 0.16 K/UL — SIGNIFICANT CHANGE UP (ref 0–0.2)
BASOPHILS NFR BLD AUTO: 1.8 % — HIGH (ref 0–1)
BILIRUB SERPL-MCNC: 0.2 MG/DL — SIGNIFICANT CHANGE UP (ref 0.2–1.2)
BUN SERPL-MCNC: 4 MG/DL — LOW (ref 10–20)
CALCIUM SERPL-MCNC: 9.2 MG/DL — SIGNIFICANT CHANGE UP (ref 8.4–10.5)
CHLORIDE SERPL-SCNC: 105 MMOL/L — SIGNIFICANT CHANGE UP (ref 98–110)
CO2 SERPL-SCNC: 21 MMOL/L — SIGNIFICANT CHANGE UP (ref 17–32)
CREAT SERPL-MCNC: 0.6 MG/DL — LOW (ref 0.7–1.5)
EGFR: 113 ML/MIN/1.73M2 — SIGNIFICANT CHANGE UP
EGFR: 113 ML/MIN/1.73M2 — SIGNIFICANT CHANGE UP
EOSINOPHIL # BLD AUTO: 0.16 K/UL — SIGNIFICANT CHANGE UP (ref 0–0.7)
EOSINOPHIL NFR BLD AUTO: 1.7 % — SIGNIFICANT CHANGE UP (ref 0–8)
GLUCOSE SERPL-MCNC: 83 MG/DL — SIGNIFICANT CHANGE UP (ref 70–99)
HCT VFR BLD CALC: 33.8 % — LOW (ref 37–47)
HGB BLD-MCNC: 10 G/DL — LOW (ref 12–16)
LYMPHOCYTES # BLD AUTO: 2.67 K/UL — SIGNIFICANT CHANGE UP (ref 1.2–3.4)
LYMPHOCYTES # BLD AUTO: 29.2 % — SIGNIFICANT CHANGE UP (ref 20.5–51.1)
MCHC RBC-ENTMCNC: 19.5 PG — LOW (ref 27–31)
MCHC RBC-ENTMCNC: 29.6 G/DL — LOW (ref 32–37)
MCV RBC AUTO: 65.9 FL — LOW (ref 81–99)
MONOCYTES # BLD AUTO: 0.57 K/UL — SIGNIFICANT CHANGE UP (ref 0.1–0.6)
MONOCYTES NFR BLD AUTO: 6.2 % — SIGNIFICANT CHANGE UP (ref 1.7–9.3)
NEUTROPHILS # BLD AUTO: 4.94 K/UL — SIGNIFICANT CHANGE UP (ref 1.4–6.5)
NEUTROPHILS NFR BLD AUTO: 54 % — SIGNIFICANT CHANGE UP (ref 42.2–75.2)
PLATELET # BLD AUTO: 410 K/UL — HIGH (ref 130–400)
PMV BLD: 10.2 FL — SIGNIFICANT CHANGE UP (ref 7.4–10.4)
POTASSIUM SERPL-MCNC: 5.2 MMOL/L — HIGH (ref 3.5–5)
POTASSIUM SERPL-SCNC: 5.2 MMOL/L — HIGH (ref 3.5–5)
PROT SERPL-MCNC: 7.9 G/DL — SIGNIFICANT CHANGE UP (ref 6–8)
RBC # BLD: 5.13 M/UL — SIGNIFICANT CHANGE UP (ref 4.2–5.4)
RBC # FLD: 18.9 % — HIGH (ref 11.5–14.5)
SODIUM SERPL-SCNC: 138 MMOL/L — SIGNIFICANT CHANGE UP (ref 135–146)
WBC # BLD: 9.14 K/UL — SIGNIFICANT CHANGE UP (ref 4.8–10.8)
WBC # FLD AUTO: 9.14 K/UL — SIGNIFICANT CHANGE UP (ref 4.8–10.8)

## 2025-05-08 PROCEDURE — 99284 EMERGENCY DEPT VISIT MOD MDM: CPT | Mod: 25

## 2025-05-08 PROCEDURE — 99284 EMERGENCY DEPT VISIT MOD MDM: CPT

## 2025-05-08 PROCEDURE — 72100 X-RAY EXAM L-S SPINE 2/3 VWS: CPT

## 2025-05-08 PROCEDURE — 80053 COMPREHEN METABOLIC PANEL: CPT

## 2025-05-08 PROCEDURE — 36415 COLL VENOUS BLD VENIPUNCTURE: CPT

## 2025-05-08 PROCEDURE — 72170 X-RAY EXAM OF PELVIS: CPT

## 2025-05-08 PROCEDURE — 72170 X-RAY EXAM OF PELVIS: CPT | Mod: 26

## 2025-05-08 PROCEDURE — 85025 COMPLETE CBC W/AUTO DIFF WBC: CPT

## 2025-05-08 PROCEDURE — 72100 X-RAY EXAM L-S SPINE 2/3 VWS: CPT | Mod: 26

## 2025-05-08 RX ORDER — IBUPROFEN 200 MG
600 TABLET ORAL ONCE
Refills: 0 | Status: COMPLETED | OUTPATIENT
Start: 2025-05-08 | End: 2025-05-08

## 2025-05-08 RX ORDER — DEXAMETHASONE 0.5 MG/1
10 TABLET ORAL ONCE
Refills: 0 | Status: COMPLETED | OUTPATIENT
Start: 2025-05-08 | End: 2025-05-08

## 2025-05-08 RX ADMIN — Medication 600 MILLIGRAM(S): at 13:19

## 2025-05-08 RX ADMIN — DEXAMETHASONE 10 MILLIGRAM(S): 0.5 TABLET ORAL at 14:20

## 2025-05-08 NOTE — ED PROVIDER NOTE - OBJECTIVE STATEMENT
44-year-old female past medical history PE, MAC, pneumonia presenting to the ED for back pain.  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  She states she went to stand up and had immediate severe pain in her back and legs.  She states the pain feels like pressure.  She was able to continue to work and ambulate.  She took Tylenol last night and states that it helped and allowed her to sleep.  Denies fever, chills, nausea, vomiting, urinary retention, bowel incontinence.

## 2025-05-08 NOTE — ED ADULT NURSE NOTE - OBJECTIVE STATEMENT
Aox4 pt c/o lower back pain which radiates to stomach and down both legs . IV placed, labs sent and meds given per provider order

## 2025-05-08 NOTE — ED PROVIDER NOTE - PHYSICAL EXAMINATION
VITAL SIGNS: I have reviewed nursing notes and confirm.  CONSTITUTIONAL: Appearing in pain, laying on side  CARD: RRR, no murmurs, rubs or gallops  RESP: Clear to ausculation bilaterally.  No rales, rhonchi, or wheezing.  ABD: Soft, non-distended, non-tender, no rebound or guarding.   EXT: Tenderness to palpation legs and back bilaterally. Unable to ambulate

## 2025-05-08 NOTE — ED ADULT NURSE NOTE - NSFALLRISKINTERV_ED_ALL_ED

## 2025-05-08 NOTE — ED PROVIDER NOTE - PROGRESS NOTE DETAILS
JV: Discussed with patient results of labs and imaging and plan to discharge home.  Patient consented

## 2025-05-08 NOTE — ED PROVIDER NOTE - PATIENT PORTAL LINK FT
You can access the FollowMyHealth Patient Portal offered by [facility] by registering at the following website: [email or web address]. By joining [employer]’s FollowMyHealth portal, you will also be able to view your health information using other applications (apps) compatible with our system.

## 2025-05-27 ENCOUNTER — RX RENEWAL (OUTPATIENT)
Age: 45
End: 2025-05-27

## 2025-05-27 NOTE — ED ADULT NURSE NOTE - CAS TRG GEN SKIN COLOR
Leah Hall (:  1999) is a 25 y.o. female, Established patient, here for evaluation of the following chief complaint(s):  Daytime Somnolence (Feels that medication effectiveness has decreased some since starting )         Assessment & Plan  1. Chronic fatigue  - Reports some improvement with the current lower dose of Provigil.  - No significant side effects reported with the initial dose.  - Increase dose to 200 mg once daily for therapeutic effect.  - Prescription sent to [facility] pharmacy.  - Monitor efficacy and tolerance.    2. Vitamin D Deficiency: Chronic.  - Recently started taking vitamin D once a week.  - Advised that it may take up to 12 weeks to notice significant improvement.  - Previous history of vitamin D deficiency noted.  - Continue supplementation as prescribed and monitor for improvement.    3. Sleep Study: Completed.  - Confirmed that a sleep study has already been conducted.  - No further immediate action required.    4. Abdominal Issue: Resolved.  - The spot in the belly button has resolved.  - No further treatment needed.    Follow-up  - Follow-up in one month to assess efficacy and tolerance of increased Provigil dose.    Results    No results found for this visit on 25.    ICD-10-CM    1. Chronic fatigue  R53.82 modafinil (PROVIGIL) 200 MG tablet      2. Vitamin D deficiency  E55.9          Return in about 4 weeks (around 2025) for Medication Follow Up.       Subjective   History of Present Illness  The patient presents for a follow-up on starting Provigil and addressing vitamin D deficiency.    Provigil Follow-Up  She reports that the initial administration of Provigil was beneficial, with a noticeable improvement in her condition compared to the previous month when she was not on any medication. She has seen another doctor who recommended trying different treatments before considering surgical intervention.  - Onset: Started recently.  - Character: Beneficial 
Normal for race

## 2025-05-27 NOTE — ED PROVIDER NOTE - OBJECTIVE STATEMENT
Continue same medications/treatment.  Patient educated on proper medication use.  Patient educated on risk factor modification.  Please bring any lab results from other providers/physicians to your next appointment.    Please bring all medicines, vitamins, and herbal supplements with you when you come to the office.    Prescriptions will not be filled unless you are compliant with your follow up appointments or have a follow up appointment scheduled as per instruction of your physician. Refills should be requested at the time of your visit.    Follow up in 4 months with FASTING blood work to be done 1 week prior to that office visit      
38 y f pmh migraines, neurofibromatosis pw cough. [place] [facility] [place]. Had been having nonproductive cough, fever, night sweats for two weeks prior to that. Took a course of amox and azithromycin with no relief, and then a course of levaquin with no relief. Pt was seen at PMD's office and told to come to ED for r/o TB 2/2 cavitary lesion and non-resolution of pneumonia. Denies recent travel, TB exposure, weight loss, abd pain, diarrhea, constipation, urinary sx, back pain.

## 2025-06-03 ENCOUNTER — NON-APPOINTMENT (OUTPATIENT)
Age: 45
End: 2025-06-03

## 2025-06-05 ENCOUNTER — APPOINTMENT (OUTPATIENT)
Dept: PULMONOLOGY | Facility: CLINIC | Age: 45
End: 2025-06-05
Payer: MEDICAID

## 2025-06-05 VITALS
WEIGHT: 147 LBS | HEIGHT: 64 IN | HEART RATE: 114 BPM | SYSTOLIC BLOOD PRESSURE: 104 MMHG | OXYGEN SATURATION: 100 % | BODY MASS INDEX: 25.1 KG/M2 | DIASTOLIC BLOOD PRESSURE: 72 MMHG

## 2025-06-05 DIAGNOSIS — Z86.19 PERSONAL HISTORY OF OTHER INFECTIOUS AND PARASITIC DISEASES: ICD-10-CM

## 2025-06-05 DIAGNOSIS — J47.9 BRONCHIECTASIS, UNCOMPLICATED: ICD-10-CM

## 2025-06-05 DIAGNOSIS — I26.99 OTHER PULMONARY EMBOLISM W/OUT ACUTE COR PULMONALE: ICD-10-CM

## 2025-06-05 PROCEDURE — 99214 OFFICE O/P EST MOD 30 MIN: CPT

## 2025-06-05 PROCEDURE — G2211 COMPLEX E/M VISIT ADD ON: CPT | Mod: NC

## 2025-06-05 RX ORDER — APIXABAN 5 MG/1
5 TABLET, FILM COATED ORAL
Qty: 180 | Refills: 3 | Status: ACTIVE | COMMUNITY
Start: 2025-06-05 | End: 1900-01-01

## 2025-07-28 ENCOUNTER — RX RENEWAL (OUTPATIENT)
Age: 45
End: 2025-07-28

## 2025-08-07 ENCOUNTER — RESULT REVIEW (OUTPATIENT)
Age: 45
End: 2025-08-07

## 2025-08-07 ENCOUNTER — OUTPATIENT (OUTPATIENT)
Dept: OUTPATIENT SERVICES | Facility: HOSPITAL | Age: 45
LOS: 1 days | End: 2025-08-07
Payer: MEDICAID

## 2025-08-07 DIAGNOSIS — J47.9 BRONCHIECTASIS, UNCOMPLICATED: ICD-10-CM

## 2025-08-07 DIAGNOSIS — Z00.8 ENCOUNTER FOR OTHER GENERAL EXAMINATION: ICD-10-CM

## 2025-08-07 PROCEDURE — 71250 CT THORAX DX C-: CPT | Mod: 26

## 2025-08-07 PROCEDURE — 71250 CT THORAX DX C-: CPT

## 2025-08-08 DIAGNOSIS — J47.9 BRONCHIECTASIS, UNCOMPLICATED: ICD-10-CM
